# Patient Record
Sex: MALE | Race: WHITE | NOT HISPANIC OR LATINO | Employment: OTHER | URBAN - METROPOLITAN AREA
[De-identification: names, ages, dates, MRNs, and addresses within clinical notes are randomized per-mention and may not be internally consistent; named-entity substitution may affect disease eponyms.]

---

## 2023-03-21 LAB
ALANINE AMINOTRANSFERASE (SGPT) (U/L) IN SER/PLAS: 16 U/L (ref 10–52)
ALBUMIN (G/DL) IN SER/PLAS: 4.2 G/DL (ref 3.4–5)
ALKALINE PHOSPHATASE (U/L) IN SER/PLAS: 61 U/L (ref 33–136)
ANION GAP IN SER/PLAS: 11 MMOL/L (ref 10–20)
ASPARTATE AMINOTRANSFERASE (SGOT) (U/L) IN SER/PLAS: 23 U/L (ref 9–39)
BASOPHILS (10*3/UL) IN BLOOD BY AUTOMATED COUNT: 0.06 X10E9/L (ref 0–0.1)
BASOPHILS/100 LEUKOCYTES IN BLOOD BY AUTOMATED COUNT: 0.8 % (ref 0–2)
BILIRUBIN TOTAL (MG/DL) IN SER/PLAS: 0.3 MG/DL (ref 0–1.2)
CALCIUM (MG/DL) IN SER/PLAS: 9.4 MG/DL (ref 8.6–10.3)
CARBON DIOXIDE, TOTAL (MMOL/L) IN SER/PLAS: 26 MMOL/L (ref 21–32)
CHLORIDE (MMOL/L) IN SER/PLAS: 107 MMOL/L (ref 98–107)
CREATININE (MG/DL) IN SER/PLAS: 1.27 MG/DL (ref 0.5–1.3)
EOSINOPHILS (10*3/UL) IN BLOOD BY AUTOMATED COUNT: 0.35 X10E9/L (ref 0–0.4)
EOSINOPHILS/100 LEUKOCYTES IN BLOOD BY AUTOMATED COUNT: 4.8 % (ref 0–6)
ERYTHROCYTE DISTRIBUTION WIDTH (RATIO) BY AUTOMATED COUNT: 12.5 % (ref 11.5–14.5)
ERYTHROCYTE MEAN CORPUSCULAR HEMOGLOBIN CONCENTRATION (G/DL) BY AUTOMATED: 32.5 G/DL (ref 32–36)
ERYTHROCYTE MEAN CORPUSCULAR VOLUME (FL) BY AUTOMATED COUNT: 91 FL (ref 80–100)
ERYTHROCYTES (10*6/UL) IN BLOOD BY AUTOMATED COUNT: 4.2 X10E12/L (ref 4.5–5.9)
GFR MALE: 57 ML/MIN/1.73M2
GLUCOSE (MG/DL) IN SER/PLAS: 110 MG/DL (ref 74–99)
HEMATOCRIT (%) IN BLOOD BY AUTOMATED COUNT: 38.1 % (ref 41–52)
HEMOGLOBIN (G/DL) IN BLOOD: 12.4 G/DL (ref 13.5–17.5)
IMMATURE GRANULOCYTES/100 LEUKOCYTES IN BLOOD BY AUTOMATED COUNT: 0.5 % (ref 0–0.9)
LEUKOCYTES (10*3/UL) IN BLOOD BY AUTOMATED COUNT: 7.4 X10E9/L (ref 4.4–11.3)
LYMPHOCYTES (10*3/UL) IN BLOOD BY AUTOMATED COUNT: 2.1 X10E9/L (ref 0.8–3)
LYMPHOCYTES/100 LEUKOCYTES IN BLOOD BY AUTOMATED COUNT: 28.5 % (ref 13–44)
MONOCYTES (10*3/UL) IN BLOOD BY AUTOMATED COUNT: 0.71 X10E9/L (ref 0.05–0.8)
MONOCYTES/100 LEUKOCYTES IN BLOOD BY AUTOMATED COUNT: 9.6 % (ref 2–10)
NEUTROPHILS (10*3/UL) IN BLOOD BY AUTOMATED COUNT: 4.1 X10E9/L (ref 1.6–5.5)
NEUTROPHILS/100 LEUKOCYTES IN BLOOD BY AUTOMATED COUNT: 55.8 % (ref 40–80)
PLATELETS (10*3/UL) IN BLOOD AUTOMATED COUNT: 226 X10E9/L (ref 150–450)
POTASSIUM (MMOL/L) IN SER/PLAS: 4.5 MMOL/L (ref 3.5–5.3)
PROTEIN TOTAL: 7.1 G/DL (ref 6.4–8.2)
SODIUM (MMOL/L) IN SER/PLAS: 139 MMOL/L (ref 136–145)
UREA NITROGEN (MG/DL) IN SER/PLAS: 21 MG/DL (ref 6–23)

## 2023-04-10 LAB
C REACTIVE PROTEIN (MG/L) IN SER/PLAS: 0.53 MG/DL
SEDIMENTATION RATE, ERYTHROCYTE: 9 MM/H (ref 0–20)

## 2023-04-13 LAB
ANCA IFA PATTERN: NORMAL
ANCA IFA TITER: NORMAL
MYELOPEROXIDASE (MPO) AB, IGG: 0 AU/ML (ref 0–19)
SERINE PROTEINASE 3 (PR3) AB, IGG: 0 AU/ML (ref 0–19)

## 2023-05-09 ENCOUNTER — APPOINTMENT (OUTPATIENT)
Dept: PRIMARY CARE | Facility: CLINIC | Age: 83
End: 2023-05-09
Payer: MEDICARE

## 2023-05-11 NOTE — PROGRESS NOTES
Subjective   Patient ID: Maurice Tripp is a 82 y.o. male who presents for referral  for cardiology (C/O SOB on exertion).    Is requesting cardiology referral at this time for concerns of shortness of breath.  Patient has been doing yard work recently and reviewed building a pond area which has required him to be driving rebar into the ground.  States what when he is doing this he will have episodic shortness of breath which he states last for 3 to 5 minutes.  He notes improvement after taking several deep breaths and will feel fine and is able to go back to working.  Denies any concurrent anginal symptoms with this, no syncope or presyncope that he has noticed, no back pain that is concurrent either.  Denies any radiation or referred pain.  Patient does have recent pulmonary history as noted below after work-up for concerning nodules initially noticed on chest x-ray.  Patient has established with pulmonology however is currently looking for a new pulmonologist as his has recently relocated.  Does have history of shortness of breath which patient states that he was noticing approximately 7 months ago however now has acutely worsened over the last week since he increased his activity.  Review of patient's recent CTs did show some scattered demonstrations of calcification within the aorta along with the coronary arteries.  Has previously been evaluated by cardiology proximately 10 years ago with exercise stress test and echo which patient states was without any abnormalities.  Denies any previous smoking history.  Cardiac risk factors: FMHx (father  at 52 of MI), male, obesity, HLD/HTN (both well controlled).     Past medical history significant for long nodule which patient follows up with pulm/oncology.  Did have a biopsy performed of the nodule which was negative for malignancy.  Per pulmonology notes suspected inflammatory etiology.  Interval changing following serial CTs of the chest showed decreasing size  "of pulmonary nodule with cavitation suspicious for potential granulomatosis.  Patient does have history of rheumatoid arthritis that is seropositive therefore is at risk for other autoimmune conditions including vasculitis.  Per rheumatology low suspicion for pulmonary rheumatoid nodules and recommended that infection versus malignancy versus vasculitis be worked up.  Most recent ANCA/vasculitis profile negative.         Review of Systems   Constitutional:  Positive for fatigue.   Respiratory:  Positive for cough, shortness of breath and wheezing.    Cardiovascular:  Negative for chest pain.   Gastrointestinal:  Negative for nausea and vomiting.   Neurological:  Negative for syncope.       Objective   /74 (BP Location: Right arm, Patient Position: Sitting)   Pulse 68   Temp 36.7 °C (98 °F)   Resp 18   Ht 1.854 m (6' 1\")   Wt 109 kg (241 lb)   SpO2 94%   BMI 31.80 kg/m²     Physical Exam  Cardiovascular:      Rate and Rhythm: Normal rate and regular rhythm.      Pulses: Normal pulses.      Heart sounds: No murmur heard.     No friction rub. No gallop.   Pulmonary:      Effort: Pulmonary effort is normal. No respiratory distress.      Breath sounds: Normal breath sounds. No wheezing, rhonchi or rales.   Musculoskeletal:      Right lower leg: No edema.      Left lower leg: No edema.         Assessment/Plan   Problem List Items Addressed This Visit          Respiratory    Shortness of breath - Primary    Relevant Orders    ECG 12 lead    Referral to Cardiology    Referral to Pulmonology     Other Visit Diagnoses       Other fatigue        Relevant Orders    ECG 12 lead        Shortness of breath: We will refer patient at this time to cardiology along with pulmonology.  Given patient's recent pulmonary nodule findings and now new onset worsening shortness of breath, concerned that there is a correlation between these 2 things or if patient has any underlying lung disease whether correlated to rheumatological " condition or not.  EKG performed in office today showed sinus bradycardia with normal rhythm, no acute concerning ischemic findings notable.  Discussed ED precautions with patient such as if increase in or worsening of symptoms.  Placed additional referral to pulmonology recommend that patient follow-up with both cardiology and pulmonology within the next 2 to 3 weeks if he is unable to get an appointment at that time recommend that he contact the office.  Patient recently had blood work over the last several weeks therefore no indication to repeat at this time.  Lipid panel from July 2022 showed patient to be at goal, no significant weight gain or change since that time.  Patient will return to clinic for follow-up after completion of referrals.

## 2023-05-12 ENCOUNTER — OFFICE VISIT (OUTPATIENT)
Dept: PRIMARY CARE | Facility: CLINIC | Age: 83
End: 2023-05-12
Payer: MEDICARE

## 2023-05-12 VITALS
RESPIRATION RATE: 18 BRPM | HEIGHT: 73 IN | WEIGHT: 241 LBS | TEMPERATURE: 98 F | SYSTOLIC BLOOD PRESSURE: 120 MMHG | DIASTOLIC BLOOD PRESSURE: 74 MMHG | BODY MASS INDEX: 31.94 KG/M2 | OXYGEN SATURATION: 94 % | HEART RATE: 68 BPM

## 2023-05-12 DIAGNOSIS — R53.83 OTHER FATIGUE: ICD-10-CM

## 2023-05-12 DIAGNOSIS — R06.02 SHORTNESS OF BREATH: Primary | ICD-10-CM

## 2023-05-12 PROBLEM — R97.20 ELEVATED PSA: Status: ACTIVE | Noted: 2023-05-12

## 2023-05-12 PROBLEM — N52.9 MALE ERECTILE DISORDER: Status: ACTIVE | Noted: 2023-05-12

## 2023-05-12 PROBLEM — J01.00 ACUTE NON-RECURRENT MAXILLARY SINUSITIS: Status: RESOLVED | Noted: 2023-05-12 | Resolved: 2023-05-12

## 2023-05-12 PROBLEM — E78.5 HYPERLIPIDEMIA: Status: ACTIVE | Noted: 2023-05-12

## 2023-05-12 PROBLEM — B35.1 FUNGAL NAIL INFECTION: Status: RESOLVED | Noted: 2023-05-12 | Resolved: 2023-05-12

## 2023-05-12 PROBLEM — F10.20 ALCOHOL DEPENDENCE (MULTI): Status: RESOLVED | Noted: 2023-05-12 | Resolved: 2023-05-12

## 2023-05-12 PROBLEM — M06.9 RHEUMATOID ARTHRITIS (MULTI): Status: ACTIVE | Noted: 2023-03-24

## 2023-05-12 PROBLEM — I10 HYPERTENSION: Status: ACTIVE | Noted: 2023-05-12

## 2023-05-12 PROBLEM — N18.31 STAGE 3A CHRONIC KIDNEY DISEASE (MULTI): Status: ACTIVE | Noted: 2023-05-12

## 2023-05-12 PROBLEM — G89.29 CHRONIC PAIN OF TOE OF LEFT FOOT: Status: RESOLVED | Noted: 2023-05-12 | Resolved: 2023-05-12

## 2023-05-12 PROBLEM — E55.9 VITAMIN D DEFICIENCY: Status: ACTIVE | Noted: 2023-05-12

## 2023-05-12 PROBLEM — J20.9 ACUTE BRONCHITIS: Status: RESOLVED | Noted: 2023-05-12 | Resolved: 2023-05-12

## 2023-05-12 PROBLEM — K21.9 GERD (GASTROESOPHAGEAL REFLUX DISEASE): Status: ACTIVE | Noted: 2023-05-12

## 2023-05-12 PROBLEM — N40.0 BPH (BENIGN PROSTATIC HYPERPLASIA): Status: ACTIVE | Noted: 2023-05-12

## 2023-05-12 PROBLEM — F41.9 ANXIETY: Status: ACTIVE | Noted: 2023-05-12

## 2023-05-12 PROBLEM — R05.2 SUBACUTE COUGH: Status: RESOLVED | Noted: 2023-05-12 | Resolved: 2023-05-12

## 2023-05-12 PROBLEM — M79.674 CHRONIC PAIN OF TOE OF RIGHT FOOT: Status: RESOLVED | Noted: 2023-05-12 | Resolved: 2023-05-12

## 2023-05-12 PROBLEM — K64.0 GRADE I HEMORRHOIDS: Status: ACTIVE | Noted: 2023-05-12

## 2023-05-12 PROBLEM — G89.29 CHRONIC PAIN OF TOE OF RIGHT FOOT: Status: RESOLVED | Noted: 2023-05-12 | Resolved: 2023-05-12

## 2023-05-12 PROBLEM — R91.8 MULTIPLE PULMONARY NODULES DETERMINED BY COMPUTED TOMOGRAPHY OF LUNG: Status: ACTIVE | Noted: 2023-05-12

## 2023-05-12 PROBLEM — U07.1 COVID-19: Status: RESOLVED | Noted: 2023-05-12 | Resolved: 2023-05-12

## 2023-05-12 PROBLEM — R05.8 PRODUCTIVE COUGH: Status: RESOLVED | Noted: 2023-05-12 | Resolved: 2023-05-12

## 2023-05-12 PROBLEM — M54.12 RIGHT CERVICAL RADICULOPATHY: Status: RESOLVED | Noted: 2023-05-12 | Resolved: 2023-05-12

## 2023-05-12 PROBLEM — R93.89 ABNORMAL CXR (CHEST X-RAY): Status: ACTIVE | Noted: 2023-05-12

## 2023-05-12 PROBLEM — M79.675 CHRONIC PAIN OF TOE OF LEFT FOOT: Status: RESOLVED | Noted: 2023-05-12 | Resolved: 2023-05-12

## 2023-05-12 PROBLEM — K62.5 BRBPR (BRIGHT RED BLOOD PER RECTUM): Status: RESOLVED | Noted: 2023-05-12 | Resolved: 2023-05-12

## 2023-05-12 PROCEDURE — 1036F TOBACCO NON-USER: CPT

## 2023-05-12 PROCEDURE — 3074F SYST BP LT 130 MM HG: CPT

## 2023-05-12 PROCEDURE — 1159F MED LIST DOCD IN RCRD: CPT

## 2023-05-12 PROCEDURE — 1157F ADVNC CARE PLAN IN RCRD: CPT

## 2023-05-12 PROCEDURE — 93000 ELECTROCARDIOGRAM COMPLETE: CPT | Performed by: FAMILY MEDICINE

## 2023-05-12 PROCEDURE — 3078F DIAST BP <80 MM HG: CPT

## 2023-05-12 PROCEDURE — 99214 OFFICE O/P EST MOD 30 MIN: CPT

## 2023-05-12 RX ORDER — LEFLUNOMIDE 20 MG/1
1 TABLET ORAL DAILY
COMMUNITY
Start: 2022-10-17 | End: 2024-01-03 | Stop reason: SDUPTHER

## 2023-05-12 RX ORDER — LOSARTAN POTASSIUM 100 MG/1
TABLET ORAL
COMMUNITY
Start: 2019-07-11 | End: 2024-02-28

## 2023-05-12 RX ORDER — IRBESARTAN 150 MG/1
150 TABLET ORAL
COMMUNITY
Start: 2020-02-02 | End: 2023-08-14 | Stop reason: ALTCHOICE

## 2023-05-12 RX ORDER — LOVASTATIN 20 MG/1
TABLET ORAL
COMMUNITY
Start: 2022-10-07 | End: 2023-11-30

## 2023-05-12 RX ORDER — ASPIRIN 81 MG/1
1 TABLET ORAL DAILY
COMMUNITY
End: 2024-02-06 | Stop reason: ALTCHOICE

## 2023-05-12 RX ORDER — FOLIC ACID 1 MG/1
1 TABLET ORAL DAILY
COMMUNITY
End: 2024-03-19 | Stop reason: ALTCHOICE

## 2023-05-12 RX ORDER — HYDROXYCHLOROQUINE SULFATE 200 MG/1
TABLET, FILM COATED ORAL
COMMUNITY
Start: 2020-02-02 | End: 2023-08-23

## 2023-05-12 RX ORDER — OMEPRAZOLE 10 MG/1
CAPSULE, DELAYED RELEASE ORAL
COMMUNITY
Start: 2022-04-26 | End: 2023-09-29

## 2023-05-12 RX ORDER — CITALOPRAM 20 MG/1
TABLET, FILM COATED ORAL
COMMUNITY
Start: 2020-02-18 | End: 2023-12-28

## 2023-05-12 RX ORDER — UBIDECARENONE 75 MG
500 CAPSULE ORAL
COMMUNITY
Start: 2020-02-02 | End: 2024-04-23 | Stop reason: WASHOUT

## 2023-05-12 ASSESSMENT — ENCOUNTER SYMPTOMS
VOMITING: 0
COUGH: 1
FATIGUE: 1
WHEEZING: 1
NAUSEA: 0
SHORTNESS OF BREATH: 1

## 2023-05-12 NOTE — PROGRESS NOTES
I reviewed with the resident the medical history and the resident’s findings on physical examination.  I discussed with the resident the patient’s diagnosis and concur with the treatment plan as documented in the resident note.     Kwame Meza, DO

## 2023-06-01 LAB
ALANINE AMINOTRANSFERASE (SGPT) (U/L) IN SER/PLAS: 22 U/L (ref 10–52)
ALBUMIN (G/DL) IN SER/PLAS: 4.1 G/DL (ref 3.4–5)
ALKALINE PHOSPHATASE (U/L) IN SER/PLAS: 52 U/L (ref 33–136)
ANION GAP IN SER/PLAS: 12 MMOL/L (ref 10–20)
ASPARTATE AMINOTRANSFERASE (SGOT) (U/L) IN SER/PLAS: 27 U/L (ref 9–39)
BILIRUBIN TOTAL (MG/DL) IN SER/PLAS: 0.4 MG/DL (ref 0–1.2)
CALCIUM (MG/DL) IN SER/PLAS: 9.5 MG/DL (ref 8.6–10.3)
CARBON DIOXIDE, TOTAL (MMOL/L) IN SER/PLAS: 25 MMOL/L (ref 21–32)
CHLORIDE (MMOL/L) IN SER/PLAS: 106 MMOL/L (ref 98–107)
CREATININE (MG/DL) IN SER/PLAS: 1.3 MG/DL (ref 0.5–1.3)
ERYTHROCYTE DISTRIBUTION WIDTH (RATIO) BY AUTOMATED COUNT: 12.8 % (ref 11.5–14.5)
ERYTHROCYTE MEAN CORPUSCULAR HEMOGLOBIN CONCENTRATION (G/DL) BY AUTOMATED: 32.3 G/DL (ref 32–36)
ERYTHROCYTE MEAN CORPUSCULAR VOLUME (FL) BY AUTOMATED COUNT: 91 FL (ref 80–100)
ERYTHROCYTES (10*6/UL) IN BLOOD BY AUTOMATED COUNT: 3.97 X10E12/L (ref 4.5–5.9)
GFR MALE: 55 ML/MIN/1.73M2
GLUCOSE (MG/DL) IN SER/PLAS: 77 MG/DL (ref 74–99)
HEMATOCRIT (%) IN BLOOD BY AUTOMATED COUNT: 36.2 % (ref 41–52)
HEMOGLOBIN (G/DL) IN BLOOD: 11.7 G/DL (ref 13.5–17.5)
LEUKOCYTES (10*3/UL) IN BLOOD BY AUTOMATED COUNT: 6.5 X10E9/L (ref 4.4–11.3)
PLATELETS (10*3/UL) IN BLOOD AUTOMATED COUNT: 218 X10E9/L (ref 150–450)
POTASSIUM (MMOL/L) IN SER/PLAS: 4.7 MMOL/L (ref 3.5–5.3)
PROTEIN TOTAL: 6.9 G/DL (ref 6.4–8.2)
SODIUM (MMOL/L) IN SER/PLAS: 138 MMOL/L (ref 136–145)
UREA NITROGEN (MG/DL) IN SER/PLAS: 19 MG/DL (ref 6–23)

## 2023-06-06 LAB — NATRIURETIC PEPTIDE B (PG/ML) IN SER/PLAS: 38 PG/ML (ref 0–99)

## 2023-07-17 LAB
ALANINE AMINOTRANSFERASE (SGPT) (U/L) IN SER/PLAS: 16 U/L (ref 10–52)
ALBUMIN (G/DL) IN SER/PLAS: 4.2 G/DL (ref 3.4–5)
ALKALINE PHOSPHATASE (U/L) IN SER/PLAS: 53 U/L (ref 33–136)
ANION GAP IN SER/PLAS: 13 MMOL/L (ref 10–20)
ASPARTATE AMINOTRANSFERASE (SGOT) (U/L) IN SER/PLAS: 20 U/L (ref 9–39)
BASOPHILS (10*3/UL) IN BLOOD BY AUTOMATED COUNT: 0.08 X10E9/L (ref 0–0.1)
BASOPHILS/100 LEUKOCYTES IN BLOOD BY AUTOMATED COUNT: 1.1 % (ref 0–2)
BILIRUBIN TOTAL (MG/DL) IN SER/PLAS: 0.5 MG/DL (ref 0–1.2)
CALCIUM (MG/DL) IN SER/PLAS: 9.5 MG/DL (ref 8.6–10.3)
CARBON DIOXIDE, TOTAL (MMOL/L) IN SER/PLAS: 27 MMOL/L (ref 21–32)
CHLORIDE (MMOL/L) IN SER/PLAS: 104 MMOL/L (ref 98–107)
CREATININE (MG/DL) IN SER/PLAS: 1.45 MG/DL (ref 0.5–1.3)
EOSINOPHILS (10*3/UL) IN BLOOD BY AUTOMATED COUNT: 0.31 X10E9/L (ref 0–0.4)
EOSINOPHILS/100 LEUKOCYTES IN BLOOD BY AUTOMATED COUNT: 4.4 % (ref 0–6)
ERYTHROCYTE DISTRIBUTION WIDTH (RATIO) BY AUTOMATED COUNT: 12.3 % (ref 11.5–14.5)
ERYTHROCYTE MEAN CORPUSCULAR HEMOGLOBIN CONCENTRATION (G/DL) BY AUTOMATED: 32.2 G/DL (ref 32–36)
ERYTHROCYTE MEAN CORPUSCULAR VOLUME (FL) BY AUTOMATED COUNT: 91 FL (ref 80–100)
ERYTHROCYTES (10*6/UL) IN BLOOD BY AUTOMATED COUNT: 4.24 X10E12/L (ref 4.5–5.9)
GFR MALE: 48 ML/MIN/1.73M2
GLUCOSE (MG/DL) IN SER/PLAS: 99 MG/DL (ref 74–99)
HEMATOCRIT (%) IN BLOOD BY AUTOMATED COUNT: 38.5 % (ref 41–52)
HEMOGLOBIN (G/DL) IN BLOOD: 12.4 G/DL (ref 13.5–17.5)
IMMATURE GRANULOCYTES/100 LEUKOCYTES IN BLOOD BY AUTOMATED COUNT: 0.6 % (ref 0–0.9)
LEUKOCYTES (10*3/UL) IN BLOOD BY AUTOMATED COUNT: 7 X10E9/L (ref 4.4–11.3)
LYMPHOCYTES (10*3/UL) IN BLOOD BY AUTOMATED COUNT: 2.08 X10E9/L (ref 0.8–3)
LYMPHOCYTES/100 LEUKOCYTES IN BLOOD BY AUTOMATED COUNT: 29.7 % (ref 13–44)
MONOCYTES (10*3/UL) IN BLOOD BY AUTOMATED COUNT: 0.83 X10E9/L (ref 0.05–0.8)
MONOCYTES/100 LEUKOCYTES IN BLOOD BY AUTOMATED COUNT: 11.8 % (ref 2–10)
NEUTROPHILS (10*3/UL) IN BLOOD BY AUTOMATED COUNT: 3.67 X10E9/L (ref 1.6–5.5)
NEUTROPHILS/100 LEUKOCYTES IN BLOOD BY AUTOMATED COUNT: 52.4 % (ref 40–80)
PLATELETS (10*3/UL) IN BLOOD AUTOMATED COUNT: 213 X10E9/L (ref 150–450)
POTASSIUM (MMOL/L) IN SER/PLAS: 4.9 MMOL/L (ref 3.5–5.3)
PROTEIN TOTAL: 7.1 G/DL (ref 6.4–8.2)
SODIUM (MMOL/L) IN SER/PLAS: 139 MMOL/L (ref 136–145)
UREA NITROGEN (MG/DL) IN SER/PLAS: 19 MG/DL (ref 6–23)

## 2023-08-01 LAB
CREATININE (MG/DL) IN SER/PLAS: 1.36 MG/DL (ref 0.5–1.3)
GFR MALE: 52 ML/MIN/1.73M2
UREA NITROGEN (MG/DL) IN SER/PLAS: 24 MG/DL (ref 6–23)

## 2023-08-10 ENCOUNTER — APPOINTMENT (OUTPATIENT)
Dept: PRIMARY CARE | Facility: CLINIC | Age: 83
End: 2023-08-10
Payer: MEDICARE

## 2023-08-14 ENCOUNTER — OFFICE VISIT (OUTPATIENT)
Dept: PRIMARY CARE | Facility: CLINIC | Age: 83
End: 2023-08-14
Payer: MEDICARE

## 2023-08-14 ENCOUNTER — LAB (OUTPATIENT)
Dept: LAB | Facility: LAB | Age: 83
End: 2023-08-14
Payer: MEDICARE

## 2023-08-14 VITALS
HEIGHT: 73 IN | RESPIRATION RATE: 16 BRPM | BODY MASS INDEX: 31.96 KG/M2 | OXYGEN SATURATION: 95 % | TEMPERATURE: 97.1 F | WEIGHT: 241.13 LBS | HEART RATE: 80 BPM | SYSTOLIC BLOOD PRESSURE: 103 MMHG | DIASTOLIC BLOOD PRESSURE: 69 MMHG

## 2023-08-14 DIAGNOSIS — E78.5 HYPERLIPIDEMIA, UNSPECIFIED HYPERLIPIDEMIA TYPE: ICD-10-CM

## 2023-08-14 DIAGNOSIS — Z12.5 PROSTATE CANCER SCREENING: ICD-10-CM

## 2023-08-14 DIAGNOSIS — D69.2 OTHER NONTHROMBOCYTOPENIC PURPURA (CMS-HCC): ICD-10-CM

## 2023-08-14 DIAGNOSIS — N18.31 STAGE 3A CHRONIC KIDNEY DISEASE (MULTI): ICD-10-CM

## 2023-08-14 DIAGNOSIS — M06.9 RHEUMATOID ARTHRITIS, INVOLVING UNSPECIFIED SITE, UNSPECIFIED WHETHER RHEUMATOID FACTOR PRESENT (MULTI): ICD-10-CM

## 2023-08-14 DIAGNOSIS — Z00.00 ROUTINE GENERAL MEDICAL EXAMINATION AT HEALTH CARE FACILITY: Primary | ICD-10-CM

## 2023-08-14 LAB
CHOLESTEROL (MG/DL) IN SER/PLAS: 138 MG/DL (ref 0–199)
CHOLESTEROL IN HDL (MG/DL) IN SER/PLAS: 36.3 MG/DL
CHOLESTEROL/HDL RATIO: 3.8
LDL: 55 MG/DL (ref 0–99)
NON HDL CHOLESTEROL: 102 MG/DL
PROSTATE SPECIFIC AG (NG/ML) IN SER/PLAS: 1.89 NG/ML (ref 0–4)
TRIGLYCERIDE (MG/DL) IN SER/PLAS: 236 MG/DL (ref 0–149)
VLDL: 47 MG/DL (ref 0–40)

## 2023-08-14 PROCEDURE — 36415 COLL VENOUS BLD VENIPUNCTURE: CPT

## 2023-08-14 PROCEDURE — G0103 PSA SCREENING: HCPCS

## 2023-08-14 PROCEDURE — 99214 OFFICE O/P EST MOD 30 MIN: CPT | Performed by: STUDENT IN AN ORGANIZED HEALTH CARE EDUCATION/TRAINING PROGRAM

## 2023-08-14 PROCEDURE — 1157F ADVNC CARE PLAN IN RCRD: CPT | Performed by: STUDENT IN AN ORGANIZED HEALTH CARE EDUCATION/TRAINING PROGRAM

## 2023-08-14 PROCEDURE — G0439 PPPS, SUBSEQ VISIT: HCPCS | Performed by: STUDENT IN AN ORGANIZED HEALTH CARE EDUCATION/TRAINING PROGRAM

## 2023-08-14 PROCEDURE — 3078F DIAST BP <80 MM HG: CPT | Performed by: STUDENT IN AN ORGANIZED HEALTH CARE EDUCATION/TRAINING PROGRAM

## 2023-08-14 PROCEDURE — 1160F RVW MEDS BY RX/DR IN RCRD: CPT | Performed by: STUDENT IN AN ORGANIZED HEALTH CARE EDUCATION/TRAINING PROGRAM

## 2023-08-14 PROCEDURE — 80061 LIPID PANEL: CPT

## 2023-08-14 PROCEDURE — 1036F TOBACCO NON-USER: CPT | Performed by: STUDENT IN AN ORGANIZED HEALTH CARE EDUCATION/TRAINING PROGRAM

## 2023-08-14 PROCEDURE — 1159F MED LIST DOCD IN RCRD: CPT | Performed by: STUDENT IN AN ORGANIZED HEALTH CARE EDUCATION/TRAINING PROGRAM

## 2023-08-14 PROCEDURE — 3074F SYST BP LT 130 MM HG: CPT | Performed by: STUDENT IN AN ORGANIZED HEALTH CARE EDUCATION/TRAINING PROGRAM

## 2023-08-14 SDOH — ECONOMIC STABILITY: HOUSING INSECURITY
IN THE LAST 12 MONTHS, WAS THERE A TIME WHEN YOU DID NOT HAVE A STEADY PLACE TO SLEEP OR SLEPT IN A SHELTER (INCLUDING NOW)?: NO

## 2023-08-14 SDOH — ECONOMIC STABILITY: FOOD INSECURITY: WITHIN THE PAST 12 MONTHS, THE FOOD YOU BOUGHT JUST DIDN'T LAST AND YOU DIDN'T HAVE MONEY TO GET MORE.: NEVER TRUE

## 2023-08-14 SDOH — ECONOMIC STABILITY: FOOD INSECURITY: WITHIN THE PAST 12 MONTHS, YOU WORRIED THAT YOUR FOOD WOULD RUN OUT BEFORE YOU GOT MONEY TO BUY MORE.: NEVER TRUE

## 2023-08-14 SDOH — HEALTH STABILITY: PHYSICAL HEALTH: ON AVERAGE, HOW MANY MINUTES DO YOU ENGAGE IN EXERCISE AT THIS LEVEL?: 0 MIN

## 2023-08-14 SDOH — ECONOMIC STABILITY: INCOME INSECURITY: IN THE LAST 12 MONTHS, WAS THERE A TIME WHEN YOU WERE NOT ABLE TO PAY THE MORTGAGE OR RENT ON TIME?: NO

## 2023-08-14 SDOH — ECONOMIC STABILITY: TRANSPORTATION INSECURITY
IN THE PAST 12 MONTHS, HAS LACK OF TRANSPORTATION KEPT YOU FROM MEETINGS, WORK, OR FROM GETTING THINGS NEEDED FOR DAILY LIVING?: NO

## 2023-08-14 SDOH — ECONOMIC STABILITY: TRANSPORTATION INSECURITY
IN THE PAST 12 MONTHS, HAS THE LACK OF TRANSPORTATION KEPT YOU FROM MEDICAL APPOINTMENTS OR FROM GETTING MEDICATIONS?: NO

## 2023-08-14 SDOH — HEALTH STABILITY: PHYSICAL HEALTH: ON AVERAGE, HOW MANY DAYS PER WEEK DO YOU ENGAGE IN MODERATE TO STRENUOUS EXERCISE (LIKE A BRISK WALK)?: 0 DAYS

## 2023-08-14 ASSESSMENT — ENCOUNTER SYMPTOMS
DEPRESSION: 0
CONSTIPATION: 0
RHINORRHEA: 0
ARTHRALGIAS: 0
LOSS OF SENSATION IN FEET: 0
VOMITING: 0
MYALGIAS: 0
DIZZINESS: 0
LIGHT-HEADEDNESS: 0
FATIGUE: 0
NAUSEA: 0
SHORTNESS OF BREATH: 0
APPETITE CHANGE: 0
ABDOMINAL PAIN: 0
BLOOD IN STOOL: 0
HEMATURIA: 0
FEVER: 0
DIARRHEA: 0
PALPITATIONS: 0
OCCASIONAL FEELINGS OF UNSTEADINESS: 0
SINUS PAIN: 0
FLANK PAIN: 0

## 2023-08-14 ASSESSMENT — SOCIAL DETERMINANTS OF HEALTH (SDOH)
HOW HARD IS IT FOR YOU TO PAY FOR THE VERY BASICS LIKE FOOD, HOUSING, MEDICAL CARE, AND HEATING?: NOT HARD AT ALL
HOW OFTEN DO YOU GET TOGETHER WITH FRIENDS OR RELATIVES?: ONCE A WEEK
WITHIN THE LAST YEAR, HAVE YOU BEEN HUMILIATED OR EMOTIONALLY ABUSED IN OTHER WAYS BY YOUR PARTNER OR EX-PARTNER?: NO
WITHIN THE LAST YEAR, HAVE TO BEEN RAPED OR FORCED TO HAVE ANY KIND OF SEXUAL ACTIVITY BY YOUR PARTNER OR EX-PARTNER?: NO
HOW OFTEN DO YOU ATTEND CHURCH OR RELIGIOUS SERVICES?: NEVER
DO YOU BELONG TO ANY CLUBS OR ORGANIZATIONS SUCH AS CHURCH GROUPS UNIONS, FRATERNAL OR ATHLETIC GROUPS, OR SCHOOL GROUPS?: YES
IN THE PAST 12 MONTHS, HAS THE ELECTRIC, GAS, OIL, OR WATER COMPANY THREATENED TO SHUT OFF SERVICE IN YOUR HOME?: NO
WITHIN THE LAST YEAR, HAVE YOU BEEN KICKED, HIT, SLAPPED, OR OTHERWISE PHYSICALLY HURT BY YOUR PARTNER OR EX-PARTNER?: NO
HOW OFTEN DO YOU ATTENT MEETINGS OF THE CLUB OR ORGANIZATION YOU BELONG TO?: MORE THAN 4 TIMES PER YEAR
WITHIN THE LAST YEAR, HAVE YOU BEEN AFRAID OF YOUR PARTNER OR EX-PARTNER?: NO
IN A TYPICAL WEEK, HOW MANY TIMES DO YOU TALK ON THE PHONE WITH FAMILY, FRIENDS, OR NEIGHBORS?: THREE TIMES A WEEK

## 2023-08-14 ASSESSMENT — LIFESTYLE VARIABLES
HOW OFTEN DO YOU HAVE A DRINK CONTAINING ALCOHOL: NEVER
HOW MANY STANDARD DRINKS CONTAINING ALCOHOL DO YOU HAVE ON A TYPICAL DAY: PATIENT DOES NOT DRINK

## 2023-08-14 ASSESSMENT — ACTIVITIES OF DAILY LIVING (ADL)
DOING_HOUSEWORK: INDEPENDENT
GROCERY_SHOPPING: INDEPENDENT
MANAGING_FINANCES: INDEPENDENT
TAKING_MEDICATION: INDEPENDENT

## 2023-08-14 ASSESSMENT — PATIENT HEALTH QUESTIONNAIRE - PHQ9
2. FEELING DOWN, DEPRESSED OR HOPELESS: NOT AT ALL
1. LITTLE INTEREST OR PLEASURE IN DOING THINGS: NOT AT ALL
SUM OF ALL RESPONSES TO PHQ9 QUESTIONS 1 AND 2: 0

## 2023-08-14 NOTE — PROGRESS NOTES
Subjective   Maurice Tripp is a 82 y.o. male who is here for a routine exam.  Active Problem List      Comprehensive Medical/Surgical/Social/Family History  Past Medical History:   Diagnosis Date    Acute bronchitis 05/12/2023    Acute non-recurrent maxillary sinusitis 05/12/2023    Alcohol dependence (CMS/HCC) 05/12/2023    BRBPR (bright red blood per rectum) 05/12/2023    Fungal nail infection 05/12/2023    Right cervical radiculopathy 05/12/2023    Subacute cough 05/12/2023     Past Surgical History:   Procedure Laterality Date    CT GUIDED PERCUTANEOUS BIOPSY LUNG  2/10/2023    CT GUIDED PERCUTANEOUS BIOPSY LUNG 2/10/2023 [facility]     Social History     Social History Narrative    Not on file         Allergies and Medications  Patient has no known allergies.  Current Outpatient Medications on File Prior to Visit   Medication Sig Dispense Refill    aspirin 81 mg EC tablet Take 1 tablet (81 mg) by mouth once daily.      citalopram (CeleXA) 20 mg tablet citalopram 20 mg tablet   TAKE 1 TABLET BY MOUTH EVERY DAY      cyanocobalamin (Vitamin B-12) 500 mcg tablet 1 tablet (500 mcg).      folic acid (Folvite) 1 mg tablet Take 1 tablet (1 mg) by mouth once daily.      hydroxychloroquine (Plaquenil) 200 mg tablet hydroxychloroquine 200 mg tablet   TAKE 2 TABLETS BY MOUTH EVERY DAY      leflunomide (Arava) 20 mg tablet Take 1 tablet (20 mg) by mouth once daily.      losartan (Cozaar) 100 mg tablet losartan 100 mg tablet   TAKE 1/2 TABLET BY MOUTH DAILY      lovastatin (Mevacor) 20 mg tablet lovastatin 20 mg tablet   TAKE 1 TABLET BY MOUTH EVERY DAY AS DIRECTED      omeprazole (PriLOSEC) 10 mg DR capsule omeprazole 10 mg capsule,delayed release   TAKE 1 CAPSULE BY MOUTH EVERY DAY      [DISCONTINUED] irbesartan (Avapro) 150 mg tablet 1 tablet (150 mg).       No current facility-administered medications on file prior to visit.     Follow with cardio - [facility]  Pulm - Pulm nodules/heme/onc - [facility]  Rheum -  "[facility]    Tired/Fatigue -states progressively more fatigued.  No changes in sleeping habits.  He has decreased physical activity, does not the urge to do these physical activities.  When he does he has difficulty.  Had significant shortness of breath with physical activity when he was working on his backyard, building a fire pit area.  He did see his cardiologist after this and was largely unremarkable.    Does have follow-up with pulmonology, rheumatology.  Does have chronic muscle aches pains and general tightness associated.  He has not taken a steroid in a significant amount of time, he is on hydroxychloroquine through rheumatology.        Lifestyle  Diet: Well-balanced, appropriate fruits vegetables meats  Exercise: Limited as above  Tobacco: None  Alcohol: None  Stress/Work: Retired, no significant stressors      Colorectal Screening:   colonoscopy  Date: 2022  Nocturia: None  Erectile dysfunction: Did not discuss    Review of Systems   Constitutional:  Negative for appetite change, fatigue and fever.   HENT:  Negative for ear discharge, ear pain, hearing loss, postnasal drip, rhinorrhea and sinus pain.    Eyes:  Negative for visual disturbance.   Respiratory:  Negative for shortness of breath.    Cardiovascular:  Negative for chest pain, palpitations and leg swelling.   Gastrointestinal:  Negative for abdominal pain, blood in stool, constipation, diarrhea, nausea and vomiting.   Genitourinary:  Negative for flank pain and hematuria.   Musculoskeletal:  Negative for arthralgias and myalgias.   Skin:  Negative for rash.   Neurological:  Negative for dizziness and light-headedness.   All other systems reviewed and are negative.      Objective   /69 (BP Location: Right arm, Patient Position: Sitting)   Pulse 80   Temp 36.2 °C (97.1 °F) (Temporal)   Resp 16   Ht 1.854 m (6' 1\")   Wt 109 kg (241 lb 2 oz)   SpO2 95%   BMI 31.81 kg/m²     Physical Exam  Vitals reviewed.   Constitutional:       General: " He is not in acute distress.     Appearance: Normal appearance. He is normal weight. He is not toxic-appearing.   HENT:      Head: Normocephalic and atraumatic.      Right Ear: Tympanic membrane and ear canal normal.      Left Ear: Tympanic membrane and ear canal normal.      Nose: Nose normal. No congestion or rhinorrhea.      Mouth/Throat:      Mouth: Mucous membranes are dry.   Eyes:      General: No scleral icterus.     Extraocular Movements: Extraocular movements intact.      Conjunctiva/sclera: Conjunctivae normal.      Pupils: Pupils are equal, round, and reactive to light.   Cardiovascular:      Rate and Rhythm: Normal rate and regular rhythm.      Heart sounds: No murmur heard.     No friction rub. No gallop.   Pulmonary:      Effort: Pulmonary effort is normal. No respiratory distress.      Breath sounds: Normal breath sounds. No wheezing, rhonchi or rales.   Abdominal:      General: Abdomen is flat. There is no distension.      Palpations: Abdomen is soft.      Tenderness: There is no abdominal tenderness. There is no guarding.   Musculoskeletal:         General: Normal range of motion.      Cervical back: Normal range of motion and neck supple.      Right lower leg: No edema.      Left lower leg: No edema.   Lymphadenopathy:      Cervical: No cervical adenopathy.   Skin:     General: Skin is warm and dry.   Neurological:      General: No focal deficit present.      Mental Status: He is alert and oriented to person, place, and time.   Psychiatric:         Mood and Affect: Mood normal.         Behavior: Behavior normal.         Assessment/Plan   Problem List Items Addressed This Visit       Hyperlipidemia    Relevant Orders    Lipid Panel    Rheumatoid arthritis (CMS/HCC)    Stage 3a chronic kidney disease (CMS/HCC)    Other nonthrombocytopenic purpura (CMS/HCC)     Other Visit Diagnoses       Routine general medical examination at health care facility    -  Primary    Prostate cancer screening         Relevant Orders    Prostate Specific Antigen          Reviewed Social Determinants of health with patient, discussed healthy lifestyle including 150 minutes of physical activity per week  Ordered/Reviewed baseline labwork -CBC, CMP, Lipid Panel  Immunizations Up-to-Date  Colonoscopy 2022 follow-up per patient preference.    For patient's fatigue, lungs were at baseline no wheezing.  Patient has had evaluation with cardiology.  Continue with evaluation with pulmonology in 2 days, if acceptable we will send in a prednisone taper which help with rheumatoid pains as well as shortness of breath likely related to chronic lung concerns.    Up-to-date on all other screenings as above.  We will get a lipid panel and PSA today.    Follow-up as new concerns arise.

## 2023-08-23 ENCOUNTER — TELEPHONE (OUTPATIENT)
Dept: PRIMARY CARE | Facility: CLINIC | Age: 83
End: 2023-08-23
Payer: MEDICARE

## 2023-08-23 DIAGNOSIS — M06.9 RHEUMATOID ARTHRITIS, INVOLVING UNSPECIFIED SITE, UNSPECIFIED WHETHER RHEUMATOID FACTOR PRESENT (MULTI): Primary | ICD-10-CM

## 2023-08-23 RX ORDER — HYDROXYCHLOROQUINE SULFATE 200 MG/1
400 TABLET, FILM COATED ORAL DAILY
Qty: 180 TABLET | Refills: 3 | Status: SHIPPED | OUTPATIENT
Start: 2023-08-23

## 2023-08-28 ENCOUNTER — APPOINTMENT (OUTPATIENT)
Dept: PRIMARY CARE | Facility: CLINIC | Age: 83
End: 2023-08-28
Payer: MEDICARE

## 2023-09-29 DIAGNOSIS — K21.9 GASTROESOPHAGEAL REFLUX DISEASE WITHOUT ESOPHAGITIS: Primary | ICD-10-CM

## 2023-09-29 RX ORDER — OMEPRAZOLE 10 MG/1
10 CAPSULE, DELAYED RELEASE ORAL DAILY
Qty: 90 CAPSULE | Refills: 3 | Status: SHIPPED | OUTPATIENT
Start: 2023-09-29

## 2023-10-10 DIAGNOSIS — G47.33 OBSTRUCTIVE SLEEP APNEA (ADULT) (PEDIATRIC): ICD-10-CM

## 2023-10-10 NOTE — TELEPHONE ENCOUNTER
Patient called RE the availability of sleep study results.     Patient agreeable to CPAP therapy - PAP therapy ordered through [facility].

## 2023-11-17 NOTE — PROGRESS NOTES
Subjective   Patient ID: [id number]   Maurice Tripp is a 83 y.o. male with seropositive RA, obesity with a BMI of 32, DL, HTN, and CKD stage III, presents for follow up for RA.     Current IS:  - HCQ 200mg BID (eye exam UTD 10/2023- [facility], no toxicity from plaquanil)   - Leflunomide 20mg QD ( for at least 2-3 years)     Prior IS:  - MTX was on it for a long time, switched over to Leflunomide as he was on MTX for too long.      HPI  He was an , retired last year  Recently diagnosed with SHA,  going in 2 days for fitting CPAP  Saw dermatology 2 weeks ago, no significant rashes  Saw ophtho las month and got checked, he has some problem with night vision in his left eye, he will get a procedure for it in 1 week  Saw pulm Dr. Venegas in 8/23, his assessment was:  Stable nodules on most recent CT scan over the past 6 months with minimal uptake on PET/CT indicating likely a benign etiology. Not sure if these nodules are related to his known history of RA. I would recommend to repeat CT scan of chest in another 6 months to continue observation. No acute respiratory sx. Most recent PFTs revealed normal spirometry, lung volumes and diffusion capacity.     The patient is doing well  Just a few minutes of  morning stiffness, no painful joints or swelling   Compliant with meds  No side effects reported  No episodes of eye inflammation  No sicca sx  No chest pain, cough or dyspnea  No rashes  No infections    ROS:  As per HPI     Rheum hx (Recall from Dr. Craven's notes):  Saw Dr. Roldan 2014 (previously Dr. Servando Barrientos- RA diagnosis, + RF tried gold shots ineffective, MTX + HCQ since ~2004), then saw Dr. Barry.      In the morning, on waking up, gets pain in feet and stiffness in feet/ankles. AM stiffness in feet can last few hours. Denies significant pain/swelling/stiffness in hand, wrists, elbow or shoulder currently. Sees Podiatry (hx of zaina involving all digits). No skin rashes. + dry mouth at night,  no dry eyes, no hx of ocular inflammation. No hx of rheumatoid nodules. Has had paresthesia in feet, EMG/NCS done twice and states the tests were ok. No night sweats, unintentional weight loss, or lymph node swelling . No bowel issues, had a colonoscopy 2 months ago, no concerns     No significant fmhx   Wife recently had a hip surgery. 2 sons. 3 grandkids    Patient Active Problem List   Diagnosis    Abnormal CXR (chest x-ray)    Anxiety    BPH (benign prostatic hyperplasia)    Elevated PSA    GERD (gastroesophageal reflux disease)    Grade I hemorrhoids    Hyperlipidemia    Hypertension    Male erectile disorder    Multiple pulmonary nodules determined by computed tomography of lung    Rheumatoid arthritis (CMS/[facility])    Stage 3a chronic kidney disease (CMS/[facility])    Vitamin D deficiency    Shortness of breath    Other nonthrombocytopenic purpura (CMS/[facility])      Past Medical History:   Diagnosis Date    Acute bronchitis 05/12/2023    Acute non-recurrent maxillary sinusitis 05/12/2023    Alcohol dependence (CMS/[facility]) 05/12/2023    BRBPR (bright red blood per rectum) 05/12/2023    Fungal nail infection 05/12/2023    Right cervical radiculopathy 05/12/2023    Subacute cough 05/12/2023      Past Surgical History:   Procedure Laterality Date    CT GUIDED PERCUTANEOUS BIOPSY LUNG  2/10/2023    CT GUIDED PERCUTANEOUS BIOPSY LUNG 2/10/2023 [facility]      Social History     Socioeconomic History    Marital status:      Spouse name: Not on file    Number of children: Not on file    Years of education: Not on file    Highest education level: Not on file   Occupational History    Not on file   Tobacco Use    Smoking status: Never    Smokeless tobacco: Never   Substance and Sexual Activity    Alcohol use: Not on file    Drug use: Not on file    Sexual activity: Not on file   Other Topics Concern    Not on file   Social History Narrative    Not on file     Social Determinants of Health     Financial Resource Strain:  Low Risk  (8/14/2023)    Overall Financial Resource Strain (CARDIA)     Difficulty of Paying Living Expenses: Not hard at all   Food Insecurity: No Food Insecurity (8/14/2023)    Hunger Vital Sign     Worried About Running Out of Food in the Last Year: Never true     Ran Out of Food in the Last Year: Never true   Transportation Needs: No Transportation Needs (8/14/2023)    PRAPARE - Transportation     Lack of Transportation (Medical): No     Lack of Transportation (Non-Medical): No   Physical Activity: Inactive (8/14/2023)    Exercise Vital Sign     Days of Exercise per Week: 0 days     Minutes of Exercise per Session: 0 min   Stress: No Stress Concern Present (8/14/2023)    [place] [employer] [place] [employer] - Occupational Stress Questionnaire     Feeling of Stress : Not at all   Social Connections: Moderately Integrated (8/14/2023)    Social Connection and Isolation Panel [NHANES]     Frequency of Communication with Friends and Family: Three times a week     Frequency of Social Gatherings with Friends and Family: Once a week     Attends Roman [employer] Services: Never     Active Member of Clubs or Organizations: Yes     Attends Club or Organization Meetings: More than 4 times per year     Marital Status:    Intimate Partner Violence: Not At Risk (8/14/2023)    Humiliation, Afraid, Rape, and Kick questionnaire     Fear of Current or Ex-Partner: No     Emotionally Abused: No     Physically Abused: No     Sexually Abused: No   Housing Stability: Unknown (8/14/2023)    Housing Stability Vital Sign     Unable to Pay for Housing in the Last Year: No     Number of Places Lived in the Last Year: Not on file     Unstable Housing in the Last Year: No      No Known Allergies     Current Outpatient Medications:     aspirin 81 mg EC tablet, Take 1 tablet (81 mg) by mouth once daily., Disp: , Rfl:     citalopram (CeleXA) 20 mg tablet, citalopram 20 mg tablet  TAKE 1 TABLET BY MOUTH EVERY DAY, Disp: , Rfl:      cyanocobalamin (Vitamin B-12) 500 mcg tablet, 1 tablet (500 mcg)., Disp: , Rfl:     folic acid (Folvite) 1 mg tablet, Take 1 tablet (1 mg) by mouth once daily., Disp: , Rfl:     hydroxychloroquine (Plaquenil) 200 mg tablet, Take 2 tablets (400 mg) by mouth once daily., Disp: 180 tablet, Rfl: 3    leflunomide (Arava) 20 mg tablet, Take 1 tablet (20 mg) by mouth once daily., Disp: , Rfl:     losartan (Cozaar) 100 mg tablet, losartan 100 mg tablet  TAKE 1/2 TABLET BY MOUTH DAILY, Disp: , Rfl:     lovastatin (Mevacor) 20 mg tablet, lovastatin 20 mg tablet  TAKE 1 TABLET BY MOUTH EVERY DAY AS DIRECTED, Disp: , Rfl:     omeprazole (PriLOSEC) 10 mg DR capsule, TAKE 1 CAPSULE DAILY, Disp: 90 capsule, Rfl: 3       Objective   Visit Vitals  /68 (BP Location: Left arm, Patient Position: Sitting)   Pulse 72      Physical Exam:  General: AAOx3, Cooperative  Eyes: EOMI, conjunctiva clear, sclera white, anicteric  Throat/Mouth: No oral deformities, no cheek swelling, mucosa appear moist, no oral ulcers noted or loss of dentition   Skin: No rashes, ulcers or photosensitive areas  MSK: Upper Extremities:  Hand/Fingers: No erythema, edema, tenderness or warmth at DIP, PIP, or MCP joints, FROM grossly. Good hand . No nodules. No deformities   Wrists: No erythema, edema, warmth or tenderness at wrist, FROM grossly  Elbows: No tenderness, edema, erythema or warmth at elbows, FROM grossly. No nodules   Shoulders: No edema, erythema, tenderness or warmth at shoulders. FROM  Lower Extremities:   Hips: No obvious deformities. No joint tenderness, normal ROM grossly. No trochanteric bursae TTP  Knees: No tenderness, deformities, edema, rashes, or warmth, normal ROM grossly. No crepitus, no pes anserine bursa TTP   Ankles, feet: No deformities, tenderness, edema, erythema, ulceration, or warmth at the ankle or MTP/IP joints, normal ROM grossly  Spine: No spinal tenderness to palpation. No SI joint tenderness    Lab Results    Component Value Date    WBC 7.0 07/17/2023    HGB 12.4 (L) 07/17/2023    HCT 38.5 (L) 07/17/2023    MCV 91 07/17/2023     07/17/2023        Chemistry    Lab Results   Component Value Date/Time     07/17/2023 0958    K 4.9 07/17/2023 0958     07/17/2023 0958    CO2 27 07/17/2023 0958    BUN 24 (H) 08/01/2023 1135    CREATININE 1.36 (H) 08/01/2023 1135    Lab Results   Component Value Date/Time    CALCIUM 9.5 07/17/2023 0958    ALKPHOS 53 07/17/2023 0958    AST 20 07/17/2023 0958    ALT 16 07/17/2023 0958    BILITOT 0.5 07/17/2023 0958         Lab Results   Component Value Date    CRP 0.53 04/10/2023      Lab Results   Component Value Date    SEDRATE 9 04/10/2023      Lab Results   Component Value Date    ALT 16 07/17/2023    AST 20 07/17/2023    ALKPHOS 53 07/17/2023    BILITOT 0.5 07/17/2023      === 02/10/23 ===  XR CHEST 1 VIEW  No sizable pneumothorax status post recent biopsy.    CT chest 7/23:  Stable scattered pulmonary nodules measuring 1.7 cm or less in size.  No new suspicious pulmonary nodule or mass. Continued surveillance recommended as per Fleischner society guidelines.      Assessment/Plan:  This is an 82 Y M with seropositive RA (, CCP 95), presenting for follow up.    Labs:  7/23: Hg improved, Cr 1.36  4/2023 ESR/CRP nml. ANCA with MPO/PR3 negative   3/21/23 Hg12. 4, GFR 57     CT chest 7/2023:   Stable scattered pulmonary nodules measuring 1.7 cm or less in size. No new suspicious pulmonary nodule or mass    CT chest 3/22/23:   1. Re-demonstration of the patient's known multiple bilateral pulmonary nodules, which have been stable or slightly decreased in size compared to the prior study  2. Interval cavitation of the dominant nodule in the left upper lobe anterior apical segment    PFTs 6/2023, no obstruction. DLCO nml     # Seropositive RA, CDAI: well controlled, no inflammatory arthritis or synovitis on exam. CDAI : 0 -> Remission  # Lung nodule- sees Pulm/Onc. Possible  etiologies including infectious vs inflammatory (RA vs ANCA vasculitis given cavitation) vs malignancy. Given inflammatory arthritis is overall well controlled, less likely RA related and notable there has been some decrease in size without change in RA medications  # Blurry vision with bright lights at night in the left eye    - Labs today and before next radha (CBC, CMP, ESR, CRP)  - Continue Leflunomide 20mg daily  - Continue HCQ 400mg daily (eye exam UTD)  - Follow up with Pulm for cavitation/nodules  - Follow up with ophtho  - Vitamin D/ca . Denies hx of fragility fx  - Vaccine UTD including COVID 19, 2nd booster 6 weeks ago, flu vaccine 6 weeks ago, PNA and shingles vaccinations (2 series)     RTC in 4 months    Plan, including risks and benefits, was discussed with the patient, informed on how to reach us.     To schedule an appointment, call [phone]  To reach the rheumatology office, call [phone]    Trice De Los Santos MD   Division of Rheumatology  [facility]

## 2023-11-20 ENCOUNTER — LAB (OUTPATIENT)
Dept: LAB | Facility: LAB | Age: 83
End: 2023-11-20
Payer: MEDICARE

## 2023-11-20 ENCOUNTER — OFFICE VISIT (OUTPATIENT)
Dept: RHEUMATOLOGY | Facility: CLINIC | Age: 83
End: 2023-11-20
Payer: MEDICARE

## 2023-11-20 VITALS
WEIGHT: 241 LBS | HEART RATE: 72 BPM | SYSTOLIC BLOOD PRESSURE: 104 MMHG | BODY MASS INDEX: 31.94 KG/M2 | HEIGHT: 73 IN | DIASTOLIC BLOOD PRESSURE: 68 MMHG

## 2023-11-20 DIAGNOSIS — Z79.60 LONG-TERM USE OF IMMUNOSUPPRESSANT MEDICATION: ICD-10-CM

## 2023-11-20 DIAGNOSIS — M05.79 RHEUMATOID ARTHRITIS INVOLVING MULTIPLE SITES WITH POSITIVE RHEUMATOID FACTOR (MULTI): ICD-10-CM

## 2023-11-20 DIAGNOSIS — R91.8 MULTIPLE PULMONARY NODULES DETERMINED BY COMPUTED TOMOGRAPHY OF LUNG: ICD-10-CM

## 2023-11-20 DIAGNOSIS — H54.7 VISION PROBLEM: ICD-10-CM

## 2023-11-20 DIAGNOSIS — M05.79 RHEUMATOID ARTHRITIS INVOLVING MULTIPLE SITES WITH POSITIVE RHEUMATOID FACTOR (MULTI): Primary | ICD-10-CM

## 2023-11-20 DIAGNOSIS — E55.9 VITAMIN D DEFICIENCY: ICD-10-CM

## 2023-11-20 LAB
ALBUMIN SERPL BCP-MCNC: 4 G/DL (ref 3.4–5)
ALP SERPL-CCNC: 53 U/L (ref 33–136)
ALT SERPL W P-5'-P-CCNC: 15 U/L (ref 10–52)
ANION GAP SERPL CALC-SCNC: 11 MMOL/L (ref 10–20)
AST SERPL W P-5'-P-CCNC: 17 U/L (ref 9–39)
BASOPHILS # BLD AUTO: 0.07 X10*3/UL (ref 0–0.1)
BASOPHILS NFR BLD AUTO: 1.1 %
BILIRUB SERPL-MCNC: 0.4 MG/DL (ref 0–1.2)
BUN SERPL-MCNC: 20 MG/DL (ref 6–23)
CALCIUM SERPL-MCNC: 9 MG/DL (ref 8.6–10.3)
CHLORIDE SERPL-SCNC: 107 MMOL/L (ref 98–107)
CO2 SERPL-SCNC: 24 MMOL/L (ref 21–32)
CREAT SERPL-MCNC: 1.36 MG/DL (ref 0.5–1.3)
CRP SERPL-MCNC: 0.41 MG/DL
EOSINOPHIL # BLD AUTO: 0.35 X10*3/UL (ref 0–0.4)
EOSINOPHIL NFR BLD AUTO: 5.7 %
ERYTHROCYTE [DISTWIDTH] IN BLOOD BY AUTOMATED COUNT: 12.5 % (ref 11.5–14.5)
ERYTHROCYTE [SEDIMENTATION RATE] IN BLOOD BY WESTERGREN METHOD: 15 MM/H (ref 0–20)
GFR SERPL CREATININE-BSD FRML MDRD: 52 ML/MIN/1.73M*2
GLUCOSE SERPL-MCNC: 98 MG/DL (ref 74–99)
HCT VFR BLD AUTO: 37.5 % (ref 41–52)
HGB BLD-MCNC: 11.9 G/DL (ref 13.5–17.5)
IMM GRANULOCYTES # BLD AUTO: 0.02 X10*3/UL (ref 0–0.5)
IMM GRANULOCYTES NFR BLD AUTO: 0.3 % (ref 0–0.9)
LYMPHOCYTES # BLD AUTO: 1.81 X10*3/UL (ref 0.8–3)
LYMPHOCYTES NFR BLD AUTO: 29.4 %
MCH RBC QN AUTO: 29.1 PG (ref 26–34)
MCHC RBC AUTO-ENTMCNC: 31.7 G/DL (ref 32–36)
MCV RBC AUTO: 92 FL (ref 80–100)
MONOCYTES # BLD AUTO: 0.8 X10*3/UL (ref 0.05–0.8)
MONOCYTES NFR BLD AUTO: 13 %
NEUTROPHILS # BLD AUTO: 3.11 X10*3/UL (ref 1.6–5.5)
NEUTROPHILS NFR BLD AUTO: 50.5 %
NRBC BLD-RTO: 0 /100 WBCS (ref 0–0)
PLATELET # BLD AUTO: 200 X10*3/UL (ref 150–450)
POTASSIUM SERPL-SCNC: 4.4 MMOL/L (ref 3.5–5.3)
PROT SERPL-MCNC: 6.2 G/DL (ref 6.4–8.2)
RBC # BLD AUTO: 4.09 X10*6/UL (ref 4.5–5.9)
SODIUM SERPL-SCNC: 138 MMOL/L (ref 136–145)
WBC # BLD AUTO: 6.2 X10*3/UL (ref 4.4–11.3)

## 2023-11-20 PROCEDURE — 1160F RVW MEDS BY RX/DR IN RCRD: CPT | Performed by: STUDENT IN AN ORGANIZED HEALTH CARE EDUCATION/TRAINING PROGRAM

## 2023-11-20 PROCEDURE — 86140 C-REACTIVE PROTEIN: CPT

## 2023-11-20 PROCEDURE — 1036F TOBACCO NON-USER: CPT | Performed by: STUDENT IN AN ORGANIZED HEALTH CARE EDUCATION/TRAINING PROGRAM

## 2023-11-20 PROCEDURE — 3078F DIAST BP <80 MM HG: CPT | Performed by: STUDENT IN AN ORGANIZED HEALTH CARE EDUCATION/TRAINING PROGRAM

## 2023-11-20 PROCEDURE — 85025 COMPLETE CBC W/AUTO DIFF WBC: CPT

## 2023-11-20 PROCEDURE — 80053 COMPREHEN METABOLIC PANEL: CPT

## 2023-11-20 PROCEDURE — 3074F SYST BP LT 130 MM HG: CPT | Performed by: STUDENT IN AN ORGANIZED HEALTH CARE EDUCATION/TRAINING PROGRAM

## 2023-11-20 PROCEDURE — 36415 COLL VENOUS BLD VENIPUNCTURE: CPT

## 2023-11-20 PROCEDURE — 85652 RBC SED RATE AUTOMATED: CPT

## 2023-11-20 PROCEDURE — 1159F MED LIST DOCD IN RCRD: CPT | Performed by: STUDENT IN AN ORGANIZED HEALTH CARE EDUCATION/TRAINING PROGRAM

## 2023-11-20 PROCEDURE — 99214 OFFICE O/P EST MOD 30 MIN: CPT | Performed by: STUDENT IN AN ORGANIZED HEALTH CARE EDUCATION/TRAINING PROGRAM

## 2023-11-30 DIAGNOSIS — E78.5 HYPERLIPIDEMIA, UNSPECIFIED HYPERLIPIDEMIA TYPE: Primary | ICD-10-CM

## 2023-11-30 RX ORDER — LOVASTATIN 20 MG/1
20 TABLET ORAL DAILY
Qty: 90 TABLET | Refills: 3 | Status: SHIPPED | OUTPATIENT
Start: 2023-11-30

## 2023-12-28 DIAGNOSIS — F41.9 ANXIETY: Primary | ICD-10-CM

## 2023-12-28 RX ORDER — CITALOPRAM 20 MG/1
20 TABLET, FILM COATED ORAL DAILY
Qty: 90 TABLET | Refills: 3 | Status: SHIPPED | OUTPATIENT
Start: 2023-12-28

## 2024-01-03 ENCOUNTER — TELEPHONE (OUTPATIENT)
Dept: RHEUMATOLOGY | Facility: CLINIC | Age: 84
End: 2024-01-03

## 2024-01-03 DIAGNOSIS — M06.9 RHEUMATOID ARTHRITIS INVOLVING MULTIPLE SITES, UNSPECIFIED WHETHER RHEUMATOID FACTOR PRESENT (MULTI): Primary | ICD-10-CM

## 2024-01-03 RX ORDER — LEFLUNOMIDE 20 MG/1
20 TABLET ORAL DAILY
Qty: 90 TABLET | Refills: 1 | Status: SHIPPED | OUTPATIENT
Start: 2024-01-03 | End: 2024-07-01

## 2024-01-03 NOTE — TELEPHONE ENCOUNTER
[facility] called in a refill for this patient for Leflunomide 20 mg tablets. Ref# [id number].    Please advise. [phone]

## 2024-01-10 ENCOUNTER — HOSPITAL ENCOUNTER (OUTPATIENT)
Dept: RADIOLOGY | Facility: HOSPITAL | Age: 84
Discharge: HOME | End: 2024-01-10
Payer: MEDICARE

## 2024-01-10 DIAGNOSIS — R91.8 OTHER NONSPECIFIC ABNORMAL FINDING OF LUNG FIELD: ICD-10-CM

## 2024-01-10 PROCEDURE — 71250 CT THORAX DX C-: CPT

## 2024-01-10 PROCEDURE — 71250 CT THORAX DX C-: CPT | Performed by: RADIOLOGY

## 2024-01-17 ENCOUNTER — OFFICE VISIT (OUTPATIENT)
Dept: PULMONOLOGY | Facility: CLINIC | Age: 84
End: 2024-01-17
Payer: MEDICARE

## 2024-01-17 VITALS
HEART RATE: 76 BPM | RESPIRATION RATE: 16 BRPM | BODY MASS INDEX: 32.63 KG/M2 | DIASTOLIC BLOOD PRESSURE: 71 MMHG | TEMPERATURE: 97.6 F | WEIGHT: 246.2 LBS | OXYGEN SATURATION: 93 % | SYSTOLIC BLOOD PRESSURE: 102 MMHG | HEIGHT: 73 IN

## 2024-01-17 DIAGNOSIS — R91.8 LUNG NODULES: Primary | ICD-10-CM

## 2024-01-17 PROCEDURE — 3074F SYST BP LT 130 MM HG: CPT | Performed by: INTERNAL MEDICINE

## 2024-01-17 PROCEDURE — 3078F DIAST BP <80 MM HG: CPT | Performed by: INTERNAL MEDICINE

## 2024-01-17 PROCEDURE — 1126F AMNT PAIN NOTED NONE PRSNT: CPT | Performed by: INTERNAL MEDICINE

## 2024-01-17 PROCEDURE — 99214 OFFICE O/P EST MOD 30 MIN: CPT | Performed by: INTERNAL MEDICINE

## 2024-01-17 PROCEDURE — 1160F RVW MEDS BY RX/DR IN RCRD: CPT | Performed by: INTERNAL MEDICINE

## 2024-01-17 PROCEDURE — 1036F TOBACCO NON-USER: CPT | Performed by: INTERNAL MEDICINE

## 2024-01-17 PROCEDURE — 1159F MED LIST DOCD IN RCRD: CPT | Performed by: INTERNAL MEDICINE

## 2024-01-17 ASSESSMENT — ENCOUNTER SYMPTOMS
LOSS OF SENSATION IN FEET: 0
DEPRESSION: 0
OCCASIONAL FEELINGS OF UNSTEADINESS: 0

## 2024-01-17 ASSESSMENT — COLUMBIA-SUICIDE SEVERITY RATING SCALE - C-SSRS
1. IN THE PAST MONTH, HAVE YOU WISHED YOU WERE DEAD OR WISHED YOU COULD GO TO SLEEP AND NOT WAKE UP?: NO
2. HAVE YOU ACTUALLY HAD ANY THOUGHTS OF KILLING YOURSELF?: NO
6. HAVE YOU EVER DONE ANYTHING, STARTED TO DO ANYTHING, OR PREPARED TO DO ANYTHING TO END YOUR LIFE?: NO

## 2024-01-17 ASSESSMENT — PATIENT HEALTH QUESTIONNAIRE - PHQ9
1. LITTLE INTEREST OR PLEASURE IN DOING THINGS: NOT AT ALL
2. FEELING DOWN, DEPRESSED OR HOPELESS: NOT AT ALL
SUM OF ALL RESPONSES TO PHQ9 QUESTIONS 1 AND 2: 0

## 2024-01-17 ASSESSMENT — PAIN SCALES - GENERAL: PAINLEVEL: 0-NO PAIN

## 2024-01-21 ENCOUNTER — APPOINTMENT (OUTPATIENT)
Dept: RADIOLOGY | Facility: HOSPITAL | Age: 84
End: 2024-01-21
Payer: MEDICARE

## 2024-01-21 ENCOUNTER — HOSPITAL ENCOUNTER (EMERGENCY)
Facility: HOSPITAL | Age: 84
Discharge: HOME | End: 2024-01-21
Attending: STUDENT IN AN ORGANIZED HEALTH CARE EDUCATION/TRAINING PROGRAM
Payer: MEDICARE

## 2024-01-21 ENCOUNTER — APPOINTMENT (OUTPATIENT)
Dept: CARDIOLOGY | Facility: HOSPITAL | Age: 84
End: 2024-01-21
Payer: MEDICARE

## 2024-01-21 VITALS
WEIGHT: 240 LBS | HEIGHT: 73 IN | BODY MASS INDEX: 31.81 KG/M2 | SYSTOLIC BLOOD PRESSURE: 120 MMHG | HEART RATE: 77 BPM | DIASTOLIC BLOOD PRESSURE: 59 MMHG | RESPIRATION RATE: 16 BRPM | TEMPERATURE: 97.2 F | OXYGEN SATURATION: 93 %

## 2024-01-21 DIAGNOSIS — M62.838 MUSCLE SPASM: Primary | ICD-10-CM

## 2024-01-21 LAB
ABO GROUP (TYPE) IN BLOOD: NORMAL
ALBUMIN SERPL BCP-MCNC: 4.4 G/DL (ref 3.4–5)
ALP SERPL-CCNC: 61 U/L (ref 33–136)
ALT SERPL W P-5'-P-CCNC: 14 U/L (ref 10–52)
ANION GAP SERPL CALC-SCNC: 15 MMOL/L (ref 10–20)
ANTIBODY SCREEN: NORMAL
APPEARANCE UR: CLEAR
AST SERPL W P-5'-P-CCNC: 20 U/L (ref 9–39)
BASOPHILS # BLD AUTO: 0.05 X10*3/UL (ref 0–0.1)
BASOPHILS NFR BLD AUTO: 0.4 %
BILIRUB SERPL-MCNC: 0.4 MG/DL (ref 0–1.2)
BILIRUB UR STRIP.AUTO-MCNC: NEGATIVE MG/DL
BNP SERPL-MCNC: 70 PG/ML (ref 0–99)
BUN SERPL-MCNC: 23 MG/DL (ref 6–23)
CALCIUM SERPL-MCNC: 9.3 MG/DL (ref 8.6–10.3)
CARDIAC TROPONIN I PNL SERPL HS: 18 NG/L (ref 0–20)
CARDIAC TROPONIN I PNL SERPL HS: 20 NG/L (ref 0–20)
CHLORIDE SERPL-SCNC: 104 MMOL/L (ref 98–107)
CO2 SERPL-SCNC: 24 MMOL/L (ref 21–32)
COLOR UR: YELLOW
CREAT SERPL-MCNC: 1.35 MG/DL (ref 0.5–1.3)
EGFRCR SERPLBLD CKD-EPI 2021: 53 ML/MIN/1.73M*2
EOSINOPHIL # BLD AUTO: 0.34 X10*3/UL (ref 0–0.4)
EOSINOPHIL NFR BLD AUTO: 3.1 %
ERYTHROCYTE [DISTWIDTH] IN BLOOD BY AUTOMATED COUNT: 12.5 % (ref 11.5–14.5)
GLUCOSE SERPL-MCNC: 93 MG/DL (ref 74–99)
GLUCOSE UR STRIP.AUTO-MCNC: NEGATIVE MG/DL
HCT VFR BLD AUTO: 40.5 % (ref 41–52)
HGB BLD-MCNC: 12.9 G/DL (ref 13.5–17.5)
IMM GRANULOCYTES # BLD AUTO: 0.04 X10*3/UL (ref 0–0.5)
IMM GRANULOCYTES NFR BLD AUTO: 0.4 % (ref 0–0.9)
INR PPP: 1 (ref 0.9–1.1)
KETONES UR STRIP.AUTO-MCNC: NEGATIVE MG/DL
LEUKOCYTE ESTERASE UR QL STRIP.AUTO: NEGATIVE
LIPASE SERPL-CCNC: 23 U/L (ref 9–82)
LYMPHOCYTES # BLD AUTO: 2.46 X10*3/UL (ref 0.8–3)
LYMPHOCYTES NFR BLD AUTO: 22.1 %
MCH RBC QN AUTO: 28.9 PG (ref 26–34)
MCHC RBC AUTO-ENTMCNC: 31.9 G/DL (ref 32–36)
MCV RBC AUTO: 91 FL (ref 80–100)
MONOCYTES # BLD AUTO: 1.01 X10*3/UL (ref 0.05–0.8)
MONOCYTES NFR BLD AUTO: 9.1 %
NEUTROPHILS # BLD AUTO: 7.24 X10*3/UL (ref 1.6–5.5)
NEUTROPHILS NFR BLD AUTO: 64.9 %
NITRITE UR QL STRIP.AUTO: NEGATIVE
NRBC BLD-RTO: 0 /100 WBCS (ref 0–0)
PH UR STRIP.AUTO: 5 [PH]
PLATELET # BLD AUTO: 246 X10*3/UL (ref 150–450)
POTASSIUM SERPL-SCNC: 4.7 MMOL/L (ref 3.5–5.3)
PROT SERPL-MCNC: 7.6 G/DL (ref 6.4–8.2)
PROT UR STRIP.AUTO-MCNC: ABNORMAL MG/DL
PROTHROMBIN TIME: 11.7 SECONDS (ref 9.8–12.8)
RBC # BLD AUTO: 4.46 X10*6/UL (ref 4.5–5.9)
RBC # UR STRIP.AUTO: NEGATIVE /UL
RBC #/AREA URNS AUTO: NORMAL /HPF
RH FACTOR (ANTIGEN D): NORMAL
SODIUM SERPL-SCNC: 138 MMOL/L (ref 136–145)
SP GR UR STRIP.AUTO: 1.03
UROBILINOGEN UR STRIP.AUTO-MCNC: <2 MG/DL
WBC # BLD AUTO: 11.1 X10*3/UL (ref 4.4–11.3)
WBC #/AREA URNS AUTO: NORMAL /HPF

## 2024-01-21 PROCEDURE — 84484 ASSAY OF TROPONIN QUANT: CPT | Performed by: STUDENT IN AN ORGANIZED HEALTH CARE EDUCATION/TRAINING PROGRAM

## 2024-01-21 PROCEDURE — 81001 URINALYSIS AUTO W/SCOPE: CPT

## 2024-01-21 PROCEDURE — 83690 ASSAY OF LIPASE: CPT

## 2024-01-21 PROCEDURE — 85025 COMPLETE CBC W/AUTO DIFF WBC: CPT | Performed by: STUDENT IN AN ORGANIZED HEALTH CARE EDUCATION/TRAINING PROGRAM

## 2024-01-21 PROCEDURE — 99285 EMERGENCY DEPT VISIT HI MDM: CPT | Performed by: STUDENT IN AN ORGANIZED HEALTH CARE EDUCATION/TRAINING PROGRAM

## 2024-01-21 PROCEDURE — 71045 X-RAY EXAM CHEST 1 VIEW: CPT | Mod: FOREIGN READ | Performed by: RADIOLOGY

## 2024-01-21 PROCEDURE — 86900 BLOOD TYPING SEROLOGIC ABO: CPT | Mod: 91

## 2024-01-21 PROCEDURE — 2550000001 HC RX 255 CONTRASTS: Performed by: STUDENT IN AN ORGANIZED HEALTH CARE EDUCATION/TRAINING PROGRAM

## 2024-01-21 PROCEDURE — 85610 PROTHROMBIN TIME: CPT | Performed by: STUDENT IN AN ORGANIZED HEALTH CARE EDUCATION/TRAINING PROGRAM

## 2024-01-21 PROCEDURE — 83880 ASSAY OF NATRIURETIC PEPTIDE: CPT | Performed by: STUDENT IN AN ORGANIZED HEALTH CARE EDUCATION/TRAINING PROGRAM

## 2024-01-21 PROCEDURE — 71275 CT ANGIOGRAPHY CHEST: CPT

## 2024-01-21 PROCEDURE — 2500000004 HC RX 250 GENERAL PHARMACY W/ HCPCS (ALT 636 FOR OP/ED)

## 2024-01-21 PROCEDURE — 36415 COLL VENOUS BLD VENIPUNCTURE: CPT | Performed by: STUDENT IN AN ORGANIZED HEALTH CARE EDUCATION/TRAINING PROGRAM

## 2024-01-21 PROCEDURE — 71045 X-RAY EXAM CHEST 1 VIEW: CPT

## 2024-01-21 PROCEDURE — 36415 COLL VENOUS BLD VENIPUNCTURE: CPT

## 2024-01-21 PROCEDURE — 80053 COMPREHEN METABOLIC PANEL: CPT | Performed by: STUDENT IN AN ORGANIZED HEALTH CARE EDUCATION/TRAINING PROGRAM

## 2024-01-21 PROCEDURE — 96372 THER/PROPH/DIAG INJ SC/IM: CPT

## 2024-01-21 PROCEDURE — 71275 CT ANGIOGRAPHY CHEST: CPT | Performed by: RADIOLOGY

## 2024-01-21 PROCEDURE — 93005 ELECTROCARDIOGRAM TRACING: CPT

## 2024-01-21 PROCEDURE — 99285 EMERGENCY DEPT VISIT HI MDM: CPT | Mod: 25

## 2024-01-21 PROCEDURE — 74174 CTA ABD&PLVS W/CONTRAST: CPT | Performed by: RADIOLOGY

## 2024-01-21 RX ORDER — KETOROLAC TROMETHAMINE 15 MG/ML
15 INJECTION, SOLUTION INTRAMUSCULAR; INTRAVENOUS ONCE
Status: COMPLETED | OUTPATIENT
Start: 2024-01-21 | End: 2024-01-21

## 2024-01-21 RX ORDER — ACETAMINOPHEN 325 MG/1
975 TABLET ORAL ONCE
Status: COMPLETED | OUTPATIENT
Start: 2024-01-21 | End: 2024-01-21

## 2024-01-21 RX ORDER — LIDOCAINE 50 MG/G
1 PATCH TOPICAL DAILY
Qty: 10 PATCH | Refills: 0 | Status: SHIPPED | OUTPATIENT
Start: 2024-01-21

## 2024-01-21 RX ORDER — ORPHENADRINE CITRATE 100 MG/1
100 TABLET, EXTENDED RELEASE ORAL 2 TIMES DAILY PRN
Qty: 20 TABLET | Refills: 0 | Status: SHIPPED | OUTPATIENT
Start: 2024-01-21 | End: 2024-03-05

## 2024-01-21 RX ORDER — ORPHENADRINE CITRATE 30 MG/ML
60 INJECTION INTRAMUSCULAR; INTRAVENOUS ONCE
Status: COMPLETED | OUTPATIENT
Start: 2024-01-21 | End: 2024-01-21

## 2024-01-21 RX ADMIN — ACETAMINOPHEN 975 MG: 325 TABLET ORAL at 19:36

## 2024-01-21 RX ADMIN — KETOROLAC TROMETHAMINE 15 MG: 15 INJECTION, SOLUTION INTRAMUSCULAR; INTRAVENOUS at 19:37

## 2024-01-21 RX ADMIN — ORPHENADRINE CITRATE 60 MG: 60 INJECTION INTRAMUSCULAR; INTRAVENOUS at 19:37

## 2024-01-21 RX ADMIN — IOHEXOL 90 ML: 350 INJECTION, SOLUTION INTRAVENOUS at 17:42

## 2024-01-21 ASSESSMENT — LIFESTYLE VARIABLES
HAVE YOU EVER FELT YOU SHOULD CUT DOWN ON YOUR DRINKING: NO
EVER FELT BAD OR GUILTY ABOUT YOUR DRINKING: NO
REASON UNABLE TO ASSESS: NO
EVER HAD A DRINK FIRST THING IN THE MORNING TO STEADY YOUR NERVES TO GET RID OF A HANGOVER: NO
HAVE PEOPLE ANNOYED YOU BY CRITICIZING YOUR DRINKING: NO

## 2024-01-21 ASSESSMENT — PAIN DESCRIPTION - LOCATION: LOCATION: ABDOMEN

## 2024-01-21 ASSESSMENT — PAIN DESCRIPTION - ORIENTATION: ORIENTATION: RIGHT

## 2024-01-21 ASSESSMENT — PAIN - FUNCTIONAL ASSESSMENT: PAIN_FUNCTIONAL_ASSESSMENT: 0-10

## 2024-01-21 ASSESSMENT — PAIN SCALES - GENERAL
PAINLEVEL_OUTOF10: 0 - NO PAIN
PAINLEVEL_OUTOF10: 0 - NO PAIN
PAINLEVEL_OUTOF10: 5 - MODERATE PAIN
PAINLEVEL_OUTOF10: 9

## 2024-01-21 NOTE — ED PROVIDER NOTES
EMERGENCY DEPARTMENT ENCOUNTER      Pt Name: Maurice Tripp  MRN: [id number]  Birthdate 1940  Date of evaluation: 1/21/2024  Provider: Colin Salinas DO    CHIEF COMPLAINT       Chief Complaint   Patient presents with    Abdominal Pain     Right side radiates to back, painful to take a breath, SOB, started this morning - took Tylenol 0800          HISTORY OF PRESENT ILLNESS    HPI    83-year-old male with past medical history significant for lung nodules currently being worked up for possible lung cancer, rheumatoid arthritis, hypertension, hyperlipidemia, SHA recently started on CPAP presenting to the emergency department for evaluation of acute onset shortness of breath, right lower back and right lower quadrant abdominal pain which started at 730 this morning.  Patient denies any chest pain but states he has has difficulty taking a deep breath.  Denies fever, chills, night sweats, nausea, vomiting, hemoptysis, recent surgery,, trauma, blood thinners, bowel/bladder incontinence, saddle anesthesia.    Nursing Notes were reviewed.    PAST MEDICAL HISTORY     Past Medical History:   Diagnosis Date    Acute bronchitis 05/12/2023    Acute non-recurrent maxillary sinusitis 05/12/2023    Alcohol dependence (CMS/HCC) 05/12/2023    BRBPR (bright red blood per rectum) 05/12/2023    Fungal nail infection 05/12/2023    Right cervical radiculopathy 05/12/2023    Subacute cough 05/12/2023         SURGICAL HISTORY       Past Surgical History:   Procedure Laterality Date    CT GUIDED PERCUTANEOUS BIOPSY LUNG  2/10/2023    CT GUIDED PERCUTANEOUS BIOPSY LUNG 2/10/2023 DOCTOR OFFICE [facility]         CURRENT MEDICATIONS       Discharge Medication List as of 1/21/2024  8:35 PM        CONTINUE these medications which have NOT CHANGED    Details   aspirin 81 mg EC tablet Take 1 tablet (81 mg) by mouth once daily., Historical Med      citalopram (CeleXA) 20 mg tablet TAKE 1 TABLET DAILY, Starting Thu 12/28/2023, Normal       cyanocobalamin (Vitamin B-12) 500 mcg tablet 1 tablet (500 mcg)., Starting Sun 2/2/2020, Historical Med      folic acid (Folvite) 1 mg tablet Take 1 tablet (1 mg) by mouth once daily., Historical Med      hydroxychloroquine (Plaquenil) 200 mg tablet Take 2 tablets (400 mg) by mouth once daily., Starting Wed 8/23/2023, Normal      leflunomide (Arava) 20 mg tablet Take 1 tablet (20 mg) by mouth once daily., Starting Wed 1/3/2024, Until Mon 7/1/2024, Normal      losartan (Cozaar) 100 mg tablet losartan 100 mg tablet   TAKE 1/2 TABLET BY MOUTH DAILY, Historical Med      lovastatin (Mevacor) 20 mg tablet TAKE 1 TABLET DAILY AS DIRECTED, Starting Thu 11/30/2023, Normal      omeprazole (PriLOSEC) 10 mg DR capsule TAKE 1 CAPSULE DAILY, Starting Fri 9/29/2023, Normal             ALLERGIES     Patient has no known allergies.    FAMILY HISTORY     No family history on file.       SOCIAL HISTORY       Social History     Socioeconomic History    Marital status:      Spouse name: Not on file    Number of children: Not on file    Years of education: Not on file    Highest education level: Not on file   Occupational History    Not on file   Tobacco Use    Smoking status: Never    Smokeless tobacco: Never   Substance and Sexual Activity    Alcohol use: Not on file    Drug use: Not on file    Sexual activity: Not on file   Other Topics Concern    Not on file   Social History Narrative    Not on file     Social Determinants of Health     Financial Resource Strain: Low Risk  (8/14/2023)    Overall Financial Resource Strain (CARDIA)     Difficulty of Paying Living Expenses: Not hard at all   Food Insecurity: No Food Insecurity (8/14/2023)    Hunger Vital Sign     Worried About Running Out of Food in the Last Year: Never true     Ran Out of Food in the Last Year: Never true   Transportation Needs: No Transportation Needs (8/14/2023)    PRAPARE - Transportation     Lack of Transportation (Medical): No     Lack of Transportation  (Non-Medical): No   Physical Activity: Inactive (8/14/2023)    Exercise Vital Sign     Days of Exercise per Week: 0 days     Minutes of Exercise per Session: 0 min   Stress: No Stress Concern Present (8/14/2023)    [employer] [employer] - Occupational Stress Questionnaire     Feeling of Stress : Not at all   Social Connections: Moderately Integrated (8/14/2023)    Social Connection and Isolation Panel [NHANES]     Frequency of Communication with Friends and Family: Three times a week     Frequency of Social Gatherings with Friends and Family: Once a week     Attends Cheondoism Services: Never     Active Member of Clubs or Organizations: Yes     Attends Club or Organization Meetings: More than 4 times per year     Marital Status:    Intimate Partner Violence: Not At Risk (8/14/2023)    Humiliation, Afraid, Rape, and Kick questionnaire     Fear of Current or Ex-Partner: No     Emotionally Abused: No     Physically Abused: No     Sexually Abused: No   Housing Stability: Unknown (8/14/2023)    Housing Stability Vital Sign     Unable to Pay for Housing in the Last Year: No     Number of Places Lived in the Last Year: Not on file     Unstable Housing in the Last Year: No       SCREENINGS                        PHYSICAL EXAM    (up to 7 for level 4, 8 or more for level 5)     ED Triage Vitals [01/21/24 1716]   Temp Heart Rate Respirations BP   36.2 °C (97.2 °F) 52 18 141/72      Pulse Ox Temp Source Heart Rate Source Patient Position   98 % Temporal -- --      BP Location FiO2 (%)     -- --       Physical Exam  Constitutional:       General: He is not in acute distress.     Appearance: He is obese. He is not ill-appearing, toxic-appearing or diaphoretic.   HENT:      Head: Normocephalic and atraumatic.      Mouth/Throat:      Mouth: Mucous membranes are moist.      Pharynx: Oropharynx is clear. No pharyngeal swelling or oropharyngeal exudate.   Eyes:      General: No scleral icterus.      Extraocular Movements: Extraocular movements intact.      Pupils: Pupils are equal, round, and reactive to light.   Cardiovascular:      Rate and Rhythm: Normal rate and regular rhythm.      Pulses:           Radial pulses are 2+ on the right side and 2+ on the left side.        Dorsalis pedis pulses are 2+ on the right side and 1+ on the left side.        Posterior tibial pulses are 2+ on the right side and 1+ on the left side.      Heart sounds: Normal heart sounds. No murmur heard.     No friction rub. No gallop.   Pulmonary:      Effort: No respiratory distress.      Breath sounds: No stridor. No wheezing, rhonchi or rales.      Comments: Patient unable to take a deep breath, however is states it is not due to chest pain or abdominal pain he is unable to vocalize why he is unable to take a deep breath.  Chest:      Chest wall: No tenderness.   Abdominal:      General: Abdomen is flat. There is no distension. There are no signs of injury.      Palpations: Abdomen is soft. There is no shifting dullness, fluid wave, hepatomegaly, splenomegaly, mass or pulsatile mass.      Tenderness: There is abdominal tenderness in the right upper quadrant and right lower quadrant. There is no right CVA tenderness, left CVA tenderness, guarding or rebound. Negative signs include Emanuel's sign, Rovsing's sign, McBurney's sign, psoas sign and obturator sign.      Hernia: No hernia is present.   Skin:     General: Skin is warm and dry.      Coloration: Skin is not cyanotic, jaundiced, mottled or pale.      Findings: No erythema or rash.   Neurological:      General: No focal deficit present.      Mental Status: He is alert and oriented to person, place, and time.   Psychiatric:         Mood and Affect: Mood normal.         Behavior: Behavior normal.          DIAGNOSTIC RESULTS     LABS:  Labs Reviewed   CBC WITH AUTO DIFFERENTIAL - Abnormal       Result Value    WBC 11.1      nRBC 0.0      RBC 4.46 (*)     Hemoglobin 12.9 (*)      Hematocrit 40.5 (*)     MCV 91      MCH 28.9      MCHC 31.9 (*)     RDW 12.5      Platelets 246      Neutrophils % 64.9      Immature Granulocytes %, Automated 0.4      Lymphocytes % 22.1      Monocytes % 9.1      Eosinophils % 3.1      Basophils % 0.4      Neutrophils Absolute 7.24 (*)     Immature Granulocytes Absolute, Automated 0.04      Lymphocytes Absolute 2.46      Monocytes Absolute 1.01 (*)     Eosinophils Absolute 0.34      Basophils Absolute 0.05     COMPREHENSIVE METABOLIC PANEL - Abnormal    Glucose 93      Sodium 138      Potassium 4.7      Chloride 104      Bicarbonate 24      Anion Gap 15      Urea Nitrogen 23      Creatinine 1.35 (*)     eGFR 53 (*)     Calcium 9.3      Albumin 4.4      Alkaline Phosphatase 61      Total Protein 7.6      AST 20      Bilirubin, Total 0.4      ALT 14     URINALYSIS WITH REFLEX MICROSCOPIC - Abnormal    Color, Urine Yellow      Appearance, Urine Clear      Specific Gravity, Urine 1.034      pH, Urine 5.0      Protein, Urine 30 (1+) (*)     Glucose, Urine NEGATIVE      Blood, Urine NEGATIVE      Ketones, Urine NEGATIVE      Bilirubin, Urine NEGATIVE      Urobilinogen, Urine <2.0      Nitrite, Urine NEGATIVE      Leukocyte Esterase, Urine NEGATIVE     B-TYPE NATRIURETIC PEPTIDE - Normal    BNP 70      Narrative:        <100 pg/mL - Heart failure unlikely  100-299 pg/mL - Intermediate probability of acute heart                  failure exacerbation. Correlate with clinical                  context and patient history.    >=300 pg/mL - Heart Failure likely. Correlate with clinical                  context and patient history.    BNP testing is performed using different testing methodology at [facility] than at other [facility]. Direct result comparisons should only be made within the same method.      PROTIME-INR - Normal    Protime 11.7      INR 1.0     SERIAL TROPONIN-INITIAL - Normal    Troponin I, High Sensitivity 18      Narrative:     Less than  99th percentile of normal range cutoff-  Female and children under 18 years old <14 ng/L; Male <21 ng/L: Negative  Repeat testing should be performed if clinically indicated.     Female and children under 18 years old 14-50 ng/L; Male 21-50 ng/L:  Consistent with possible cardiac damage and possible increased clinical   risk. Serial measurements may help to assess extent of myocardial damage.     >50 ng/L: Consistent with cardiac damage, increased clinical risk and  myocardial infarction. Serial measurements may help assess extent of   myocardial damage.      NOTE: Children less than 1 year old may have higher baseline troponin   levels and results should be interpreted in conjunction with the overall   clinical context.     NOTE: Troponin I testing is performed using a different   testing methodology at [facility] than at other   [facility]. Direct result comparisons should only   be made within the same method.   SERIAL TROPONIN, 1 HOUR - Normal    Troponin I, High Sensitivity 20      Narrative:     Less than 99th percentile of normal range cutoff-  Female and children under 18 years old <14 ng/L; Male <21 ng/L: Negative  Repeat testing should be performed if clinically indicated.     Female and children under 18 years old 14-50 ng/L; Male 21-50 ng/L:  Consistent with possible cardiac damage and possible increased clinical   risk. Serial measurements may help to assess extent of myocardial damage.     >50 ng/L: Consistent with cardiac damage, increased clinical risk and  myocardial infarction. Serial measurements may help assess extent of   myocardial damage.      NOTE: Children less than 1 year old may have higher baseline troponin   levels and results should be interpreted in conjunction with the overall   clinical context.     NOTE: Troponin I testing is performed using a different   testing methodology at [facility] than at other   [facility]. Direct result comparisons  should only   be made within the same method.   LIPASE - Normal    Lipase 23      Narrative:     Venipuncture immediately after or during the administration of Metamizole may lead to falsely low results. Testing should be performed immediately prior to Metamizole dosing.   MICROSCOPIC ONLY, URINE - Normal    WBC, Urine NONE      RBC, Urine NONE     TROPONIN SERIES- (INITIAL, 1 HR)    Narrative:     The following orders were created for panel order Troponin Series, (0, 1 HR).  Procedure                               Abnormality         Status                     ---------                               -----------         ------                     Troponin I, High Sensiti...[[id number]]  Normal              Final result               Troponin, High Sensitivi...[[id number]]  Normal              Final result                 Please view results for these tests on the individual orders.   TYPE AND SCREEN    ABO TYPE O      Rh TYPE NEG      ANTIBODY SCREEN NEG     URINE GRAY TUBE       All other labs were within normal range or not returned as of this dictation.    Imaging  XR chest 1 view   Final Result   Multiple lung nodules, no focal infiltrates seen.   Signed by Britton Kumar MD      CT angio chest abdomen pelvis   Final Result   CHEST   1. Numerous bilateral lung nodules concerning for metastatic disease.   Further evaluation with PET-CT or tissue sampling is recommended.   2. No evidence for acute pulmonary embolic disease.   3.  No CT evidence for acute aortic aneurysm or dissection.   4. Additional detailed findings as above.        ABDOMEN - PELVIS   1.  No CT evidence for acute aortic aneurysm or dissection.   2. Mild plaque burden particularly involving distal abdominal aorta   and the origin of bilateral renal arteries.   3. Subcentimeter low-attenuation lesion involving dome of the liver   which may represent a cyst but incompletely characterized in this   exam. Attention in subsequent follow-up studies  recommended.   4. Left renal cyst.   5. Prostatomegaly. Correlation with PSA levels recommended.   6. Additional detailed findings as above.             MACRO:   Critical Finding:  See findings. Notification was initiated on   1/21/2024 at 6:37 pm by  James Abrams.  (**-YCF-**) Instructions:        Signed by: James Abrams 1/21/2024 6:38 PM   Dictation workstation:   [id number]           Procedures  Procedures     EMERGENCY DEPARTMENT COURSE/MDM:     Diagnoses as of 01/22/24 0003   Muscle spasm        Medical Decision Making    83-year-old male with past medical history significant for lung nodules currently being worked up for possible lung cancer, rheumatoid arthritis, hypertension, hyperlipidemia, SHA recently started on CPAP presenting to the emergency department for evaluation of acute onset shortness of breath, right lower back and right lower quadrant abdominal pain which started at 730 this morning.  Initial triage blood pressure 141/71, vitals otherwise unremarkable.  During my exam patient's blood pressure was in the 180s systolic and bilateral extremities.  Radial pulses 2+, however DP and PT pulses in the left lower extremity 1+ and 2+ on the right concerning for possible dissection.  Chest attack was paged and patient was sent down for CT angio chest abdomen pelvis.  Cardiac workup, urinalysis, type and screen, PT/INR ordered.    EKG: Sinus rhythm with a ventricular rate of 70 bpm, KS interval 184 ms, QRS 90 ms, QTc 378 ms. [id number] suggesting possible right heart strain.  Otherwise no evidence of acute ischemic changes.     Labs unremarkable for acute pathology.  No evidence of dissection, pulmonary embolism, or other acute pathology on chest x-ray or CT angio.  The patient has no clear etiology regarding his symptoms, Toradol, Norflex, Tylenol ordered for possible muscle spasm or muscle other musculoskeletal etiology.  Patient reassessed after medications and states his symptoms have resolved.  Patient  deemed safe for discharge home with prescription for Norflex and lidocaine patches for outpatient follow-up with his primary care physician.    Patient and or family in agreement and understanding of treatment plan.  All questions answered.      I reviewed the case with the attending ED physician. The attending ED physician agrees with the plan. Patient and/or patient´s representative was counseled regarding labs, imaging, likely diagnosis, and plan. All questions were answered.    Colin Salinas DO  Emergency Medicine, PGY2    ED Medications administered this visit:    Medications   iohexol (OMNIPaque) 350 mg iodine/mL solution 90 mL (90 mL intravenous Given 1/21/24 1742)   orphenadrine (Norflex) injection 60 mg (60 mg intramuscular Given 1/21/24 1937)   acetaminophen (Tylenol) tablet 975 mg (975 mg oral Given 1/21/24 1936)   ketorolac (Toradol) injection 15 mg (15 mg intramuscular Given 1/21/24 1937)       New Prescriptions from this visit:    Discharge Medication List as of 1/21/2024  8:35 PM        START taking these medications    Details   lidocaine (Lidoderm) 5 % patch Place 1 patch over 12 hours on the skin once daily. Remove & discard patch within 12 hours or as directed by MD., Starting Sun 1/21/2024, Normal      orphenadrine (Norflex) 100 mg 12 hr tablet Take 1 tablet (100 mg) by mouth 2 times a day as needed for muscle spasms for up to 10 days. Do not crush, chew, or split., Starting Sun 1/21/2024, Until Wed 1/31/2024 at 2359, Normal             Follow-up:  Toney Almeida DO  [id number]  [facility]  [facility] [id number]  [phone]    In 1 day          Final Impression:   1. Muscle spasm          (Please note that portions of this note were completed with a voice recognition program.  Efforts were made to edit the dictations but occasionally words are mis-transcribed.)     Colin Salinas DO  Resident  01/22/24 0004

## 2024-01-22 LAB — HOLD SPECIMEN: NORMAL

## 2024-01-22 NOTE — DISCHARGE INSTRUCTIONS
Call to schedule follow-up appointment with your primary care physician preferably within the next day.  Take your prescription medications as directed.  Return to the emergency department immediately for any new or worsening symptoms such as chest pain, shortness of breath, fevers, chills, night sweats, heart palpitations, lightheadedness, or if you pass out.

## 2024-01-22 NOTE — PROGRESS NOTES
Department of Medicine  Division of Pulmonary, Critical Care, and Sleep Medicine  Follow-Up Visit  [facility] - [address], [address]    Physician HPI:  Mr. Tripp is a pleasant 83-year-old man with past medical history significant for rheumatoid arthritis on leflunomide and hydroxychloroquine who presented to the office today regarding multiple pulmonary nodules.  Maurice has no acute respiratory symptoms today.  I reviewed his most recent CT scan of chest that was done in July 2023, previous CT scan in March and January 2023 and his most recent PET/CT scan. Multiple pulmonary nodules noted the largest is 1.7 cm in size. Nodules are solid and noncalcified. It did show minimal uptake on the PET CT scan. Underwent CT-guided biopsy previously which revealed fibro inflammatory changes with few lymphoplasmacytic inflammation that were negative for IgG stain.     Follow up 1/17/2024:  Respiratory status has been stable since last visit.   Repeated CT scan of chest revealed interval increase in the size of the previously noted pulmonary nodules. In addition, a few of the nodules are now cavitary. The patient denies acute cough, fevers, chills or night sweats since last visit. No acute chest pain or hemoptysis. No interval hx of RA flares up.     Immunization History   Administered Date(s) Administered    Influenza, seasonal, injectable 10/08/2023    Moderna SARS-CoV-2 Vaccination 02/16/2021, 03/17/2021, 10/27/2021, 04/12/2022, 10/08/2023       Current Outpatient Medications   Medication Instructions    aspirin 81 mg EC tablet 1 tablet, oral, Daily    citalopram (CELEXA) 20 mg, oral, Daily    cyanocobalamin (VITAMIN B-12) 500 mcg    folic acid (Folvite) 1 mg tablet 1 tablet, oral, Daily    hydroxychloroquine (PLAQUENIL) 400 mg, oral, Daily    leflunomide (ARAVA) 20 mg, oral, Daily    lidocaine (Lidoderm) 5 % patch 1 patch, transdermal, Daily, Remove & discard patch within 12 hours or as directed by MD.    losartan (Cozaar)  "100 mg tablet losartan 100 mg tablet   TAKE 1/2 TABLET BY MOUTH DAILY    lovastatin (MEVACOR) 20 mg, oral, Daily, as directed    omeprazole (PRILOSEC) 10 mg, oral, Daily    orphenadrine (NORFLEX) 100 mg, oral, 2 times daily PRN, Do not crush, chew, or split.        Allergies:  No Known Allergies       Visit Vitals  /71 (BP Location: Left arm, Patient Position: Sitting, BP Cuff Size: Large adult)   Pulse 76   Temp 36.4 °C (97.6 °F) (Temporal)   Resp 16   Ht 1.854 m (6' 1\")   Wt 112 kg (246 lb 3.2 oz)   SpO2 93%   BMI 32.48 kg/m²   Smoking Status Never   BSA 2.4 m²      Physical Exam     Constitutional: Alert and in no acute distress. Well developed, well nourished.   Ears, Nose, Mouth, and Throat: External inspection of ears and nose: Normal.    Pulmonary: Chest: Normal to inspection. ~Respiratory effort: No increased work of breathing or signs of respiratory distress. ~Auscultation of lungs: Clear to auscultation bilaterally.    Cardiovascular: Heart rate and rhythm were normal, normal S1 and S2, no gallops, no murmurs and no pericardial rub.   Skin: Normal skin color and pigmentation, normal skin turgor, and no rash.   Psychiatric: Judgment and insight: Intact.~Alert and oriented to person, place and time.~Mood and affect: Normal.     Chest Radiograph     XR chest 1 view 01/21/2024    Narrative  STUDY:  Chest Radiograph;  1/21/2024 at 6:20 PM.  INDICATION:  Chest pain.  COMPARISON:  CT chest 1/10/2024, 7/10/2023, 3/22/2023; XR chest 10/14/2021  ACCESSION NUMBER(S):  [id number]  ORDERING CLINICIAN:  CHINO BRADY  TECHNIQUE:  Frontal chest was obtained at 18:20 hours.  FINDINGS:  CARDIOMEDIASTINAL SILHOUETTE:  Cardiomediastinal silhouette is normal in size and configuration.    LUNGS:  Bilaterally upper lobe apical nodules again noted, as well as in the  right lower lobe.  This corresponds with recent CT exam.    ABDOMEN:  No remarkable upper abdominal findings.    BONES:  No acute osseous " changes.    Impression  Multiple lung nodules, no focal infiltrates seen.  Signed by Britton Kumar MD      XR CHEST 1 VIEW 02/10/2023    Narrative  MRN: [id number]  Patient Name: YI DAMON    STUDY:  CHEST 1 VIEW;  2/10/2023 1:49 pm    INDICATION:  bilateral lung nodules. now s/p YOSI nodule bx. eval for PTX .    COMPARISON:  Chest radiograph 2/10/2023 10:51 a.m.    ACCESSION NUMBER(S):  [id number]    ORDERING CLINICIAN:  ISHAN CEJA    FINDINGS:  PA and lateral radiographs of the chest were provided.  Additional PA  dual energy images were also provided.        CARDIOMEDIASTINAL SILHOUETTE:  Cardiomediastinal silhouette is normal in size and configuration.    LUNGS:  No pneumothorax. No consolidation, pulmonary edema, or pleural  effusion.    ABDOMEN:  No remarkable upper abdominal findings.    BONES:  No acute osseous changes.    Impression  No sizable pneumothorax status post recent biopsy.    I personally reviewed the images/study and I agree with the findings  as stated by radiology resident Kristal Strauss MD. This study was  interpreted at [facility],  [place], [place].      Echocardiogram     Echocardiogram     Narrative  [facility] [place]  [id number] [address] [facility] [address]  Tel [phone] Fax [phone]    TRANSTHORACIC ECHOCARDIOGRAM REPORT      Patient Name:     YI DAMON Reading Physician:   [id number] Jaky Hoyos MD  Study Date:       7/13/2023      Referring Physician: JAKY HOYOS  MRN/PID:          [id number]       PCP:  Accession/Order#: [id number]   Department Location: [facility]  YOB: 1940       Fellow:  Gender:           M              Nurse:  Admit Date:                      Sonographer:         Mikki Luque [facility]  Admission Status: Outpatient     Additional Staff:  Height:           185.42 cm      CC Report to:  Weight:           108.41 kg      Study Type:          Echocardiogram  BSA:              2.32 m2  Blood  Pressure: 120 /68 mmHg    Diagnosis/ICD: R06.09-Other forms of dyspnea  Indication:    Dyspnea  Procedure/CPT: Echo Complete w Full Doppler-[id number]  Study Detail: The following Echo studies were performed: 2D, M-Mode, color flow  and Doppler. Definity used as a contrast agent for endocardial  border definition. Total contrast used for this procedure was 3 mL  via IV push.      PHYSICIAN INTERPRETATION:  Left Ventricle: Left ventricular systolic function is normal, with an estimated ejection fraction of 55-60%. There are no regional wall motion abnormalities. The left ventricular cavity size is normal. Spectral Doppler shows an impaired relaxation pattern of left ventricular diastolic filling.  Left Atrium: The left atrium is normal in size.  Right Ventricle: The right ventricle is normal in size. There is normal right ventricular global systolic function.  Right Atrium: The right atrium is normal in size.  Aortic Valve: The aortic valve appears structurally normal. There is mild aortic valve regurgitation. The peak instantaneous gradient of the aortic valve is 7.4 mmHg. The mean gradient of the aortic valve is 4.0 mmHg.  Mitral Valve: The mitral valve is normal in structure. There is no evidence of mitral valve regurgitation.  Tricuspid Valve: The tricuspid valve is structurally normal. There is trace tricuspid regurgitation. The estimated RVSP is 29 mm.  Pulmonic Valve: The pulmonic valve is structurally normal. There is trace pulmonic valve regurgitation.  Pericardium: There is no pericardial effusion noted.  Aorta: The aortic root is normal.      CONCLUSIONS:  1. Left ventricular systolic function is normal with a 55-60% estimated ejection fraction.  2. Spectral Doppler shows an impaired relaxation pattern of left ventricular diastolic filling.  3. The estimated RVSP is 29 mm.  4. Mild aortic valve regurgitation.    QUANTITATIVE DATA SUMMARY:  2D MEASUREMENTS:  Normal Ranges:  LAs:           4.30 cm    (2.7-4.0cm)  IVSd:          0.98 cm   (0.6-1.1cm)  LVPWd:         0.80 cm   (0.6-1.1cm)  LVIDd:         5.40 cm   (3.9-5.9cm)  LVIDs:         4.22 cm  LV Mass Index: 76.5 g/m2  LV % FS        21.9 %    LA VOLUME:  Normal Ranges:  LA Vol A4C:        41.2 ml    (22+/-6mL/m2)  LA Vol A2C:        43.6 ml  LA Vol BP:         42.5 ml  LA Vol Index A4C:  17.8ml/m2  LA Vol Index A2C:  18.8 ml/m2  LA Vol Index BP:   18.3 ml/m2  LA Area A4C:       16.2 cm2  LA Area A2C:       16.7 cm2  LA Major Axis A4C: 5.4 cm  LA Major Axis A2C: 5.4 cm  LA Volume Index:   16.8 ml/m2  LA Vol A4C:        37.0 ml  LA Vol A2C:        42.0 ml    AORTA MEASUREMENTS:  Normal Ranges:  Asc Ao, d: 3.75 cm (2.1-3.4cm)    LV SYSTOLIC FUNCTION BY 2D PLANIMETRY (MOD):  Normal Ranges:  EF-A4C View: 55.3 % (>=55%)  EF-A2C View: 55.6 %  EF-Biplane:  54.9 %    LV DIASTOLIC FUNCTION:  Normal Ranges:  MV Peak E:    0.49 m/s (0.7-1.2 m/s)  MV Peak A:    0.69 m/s (0.42-0.7 m/s)  E/A Ratio:    0.71     (1.0-2.2)  MV e'         0.07 m/s (>8.0)  MV lateral e' 0.09 m/s  MV medial e'  0.05 m/s  E/e' Ratio:   7.02     (<8.0)    MITRAL VALVE:  Normal Ranges:  MV DT: 289 msec (150-240msec)    AORTIC VALVE:  Normal Ranges:  AoV Vmax:                1.36 m/s (<=1.7m/s)  AoV Peak P.4 mmHg (<20mmHg)  AoV Mean P.0 mmHg (1.7-11.5mmHg)  LVOT Max Juaquin:            0.89 m/s (<=1.1m/s)  AoV VTI:                 25.90 cm (18-25cm)  LVOT VTI:                18.20 cm  LVOT Diameter:           2.00 cm  (1.8-2.4cm)  AoV Area, VTI:           2.21 cm2 (2.5-5.5cm2)  AoV Area,Vmax:           2.05 cm2 (2.5-4.5cm2)  AoV Dimensionless Index: 0.70    AORTIC INSUFFICIENCY:  AI Vmax:       4.15 m/s  AI Half-time:  652 msec  AI Decel Rate: 197.50 cm/s2      RIGHT VENTRICLE:  RV Basal 3.12 cm  RV Mid   2.13 cm  RV Major 7.3 cm  TAPSE:   23.3 mm  RV s'    0.11 m/s    TRICUSPID VALVE/RVSP:  Normal Ranges:  Peak TR Velocity: 1.98 m/s  RV Syst Pressure: 18.7 mmHg (<  30mmHg)      [id number] Bull Balderas MD  Electronically signed on 7/14/2023 at 6:55:35 PM       Chest CT Scan     CT chest wo IV contrast 01/10/2024    Narrative  Interpreted By:  Sandro Mayer,  STUDY:  CT CHEST WO IV CONTRAST;  1/10/2024 9:38 am    INDICATION:  Signs/Symptoms: multiple lung nodules - 6 months follow up  R91.8:  Lung nodules.    COMPARISON:  07/10/2023    ACCESSION NUMBER(S):  [id number]    ORDERING CLINICIAN:  TERESSA RODGERS    TECHNIQUE:  Helical data acquisition of the chest was obtained without the use of  IV contrast. Images were reformatted in axial, coronal, and sagittal  planes.    FINDINGS:  POTENTIAL LIMITATIONS OF THE STUDY:   Lack of IV contrast    HEART AND VESSELS:    There are atherosclerotic calcifications of the aorta and its  branches. The aorta is unchanged in course and caliber.    The heart is not significantly enlarged.    No pericardial effusion is seen.    MEDIASTINUM AND JOE, LOWER NECK AND AXILLA:  The visualized thyroid gland is within normal limits.    No evidence of thoracic lymphadenopathy by CT criteria.    Esophagus appears within normal limits as seen.    LUNGS AND AIRWAYS:  The trachea and central airways are patent. No endobronchial lesion.    There are numerous nodules scattered throughout the lungs which have  increased in size when compared to the previous examination. In  addition, a few of the nodules are now cavitary. A right upper lobe  nodule which measures up to 1.5 cm on today's study measured  approximately 1.3 cm on the previous study, image 72. Cavitary nodule  in the left upper lobe which measures 1.8 cm on today's exam measured  approximately 1.6 cm on the previous study, image 68. 9 mm nodule in  the right lower lobe measured 7 mm on the previous study, image 154.  The remaining nodules are either increased in size or are stable in  size. No definite new nodules are identified. No effusion. No  pneumothorax. Mild scattered areas of  atelectasis/scarring.    UPPER ABDOMEN:  The visualized subdiaphragmatic structures demonstrate no acute  abnormality.    CHEST WALL AND OSSEOUS STRUCTURES:  Degenerative changes. No acute process.    Impression  Interval increase in the size of the previously noted pulmonary  nodules. In addition, a few of the nodules are now cavitary. Findings  may relate to progression of metastatic disease or possibly  infectious/inflammatory process. Correlate with biopsy results.    MACRO:  None.    Signed by: Charito Mayer 1/11/2024 4:25 PM  Dictation workstation:   [id number]      CT chest wo IV contrast 07/10/2023    Narrative  Interpreted By:  CHARITO MAYER MD  MRN: [id number]  Patient Name: YI DAMON    STUDY:  CT CHEST WO CONTRAST;  7/10/2023 4:57 pm    INDICATION:  lung nodules  R91.8: Multiple pulmonary nodules determined by  computed tomography of lung.    COMPARISON:  03/22/2023    ACCESSION NUMBER(S):  [id number]    ORDERING CLINICIAN:  ANNIE ROSADO    TECHNIQUE:  Helical data acquisition of the chest was obtained without the use of  IV contrast. Images were reformatted in axial, coronal, and sagittal  planes.    FINDINGS:  POTENTIAL LIMITATIONS OF THE STUDY:   Lack of IV contrast    HEART AND VESSELS:    There are atherosclerotic calcifications of the aorta and its  branches. Aorta is unchanged in course and caliber.    The heart is unchanged in size.    No pericardial effusion is seen.    MEDIASTINUM AND JOE, LOWER NECK AND AXILLA:  The visualized thyroid gland is within normal limits.    No evidence of thoracic lymphadenopathy by CT criteria.    Esophagus is stable in course and caliber. Small hiatal hernia.    LUNGS AND AIRWAYS:  The trachea and central airways are patent. No endobronchial lesion.    The patient's known pulmonary nodules are again identified and are  not significantly changed in size, measuring up to 1.7 Cm. For  example, images 62, 58, 168, 191, 215 and 201 of 293. No new  suspicious pulmonary  nodule or mass. No consolidation. No evidence of  an effusion or pneumothorax. There are mild scattered areas of  atelectasis/scarring.    UPPER ABDOMEN:  The visualized subdiaphragmatic structures demonstrate no acute  abnormality. Stable subcentimeter hypoattenuating lesion in the right  hepatic lobe which is statistically most likely a cyst but is too  small to characterize. Left renal cyst is again partially  included/imaged.    CHEST WALL AND OSSEOUS STRUCTURES:  Degenerative changes. No acute process.    Impression  Stable scattered pulmonary nodules measuring 1.7 cm or less in size.  No new suspicious pulmonary nodule or mass. Continued surveillance  recommended as per Fleischner society guidelines.       Laboratory Studies     Lab Results   Component Value Date    WBC 11.1 01/21/2024    HGB 12.9 (L) 01/21/2024    HCT 40.5 (L) 01/21/2024    MCV 91 01/21/2024     01/21/2024      Lab Results   Component Value Date    GLUCOSE 93 01/21/2024    CALCIUM 9.3 01/21/2024     01/21/2024    K 4.7 01/21/2024    CO2 24 01/21/2024     01/21/2024    BUN 23 01/21/2024    CREATININE 1.35 (H) 01/21/2024      Lab Results   Component Value Date    ALT 14 01/21/2024    AST 20 01/21/2024    ALKPHOS 61 01/21/2024    BILITOT 0.4 01/21/2024        Protime   Date/Time Value Ref Range Status   01/21/2024 05:37 PM 11.7 9.8 - 12.8 seconds Final     INR   Date/Time Value Ref Range Status   01/21/2024 05:37 PM 1.0 0.9 - 1.1 Final           Assessment and Plan / Recommendations     1. Multiple pulmonary nodules:  Unclear etiology. PET/CT scan earlier last year revealed minimal metabolic activity indicating likely a benign etiology. S/P CT guided biopsy in Feb 2023 that was non-diagnostic. The nodules could be related to RA. Metastatic disease is felt to be less likely though given their interval increase in size, we discussed today the need to re-sample. The nodules are mostly peripheral and sub-pleural thus will consult  IR again for CT-guided biopsy. Will send biopsy to pathology and microbiology.     Please excuse any misspellings or unintended errors related to the Dragon speech recognition software used to dictate this note.         Obed Camarena MD   01/17/2024

## 2024-01-26 ENCOUNTER — OFFICE VISIT (OUTPATIENT)
Dept: HEMATOLOGY/ONCOLOGY | Facility: CLINIC | Age: 84
End: 2024-01-26
Payer: MEDICARE

## 2024-01-26 ENCOUNTER — APPOINTMENT (OUTPATIENT)
Dept: HEMATOLOGY/ONCOLOGY | Facility: CLINIC | Age: 84
End: 2024-01-26
Payer: MEDICARE

## 2024-01-26 VITALS
DIASTOLIC BLOOD PRESSURE: 75 MMHG | WEIGHT: 241.4 LBS | SYSTOLIC BLOOD PRESSURE: 134 MMHG | RESPIRATION RATE: 16 BRPM | OXYGEN SATURATION: 94 % | BODY MASS INDEX: 31.85 KG/M2 | HEART RATE: 80 BPM | TEMPERATURE: 95.4 F

## 2024-01-26 DIAGNOSIS — R91.8 MULTIPLE PULMONARY NODULES DETERMINED BY COMPUTED TOMOGRAPHY OF LUNG: Primary | ICD-10-CM

## 2024-01-26 PROCEDURE — 99213 OFFICE O/P EST LOW 20 MIN: CPT | Performed by: INTERNAL MEDICINE

## 2024-01-26 PROCEDURE — 1157F ADVNC CARE PLAN IN RCRD: CPT | Performed by: INTERNAL MEDICINE

## 2024-01-26 PROCEDURE — 3078F DIAST BP <80 MM HG: CPT | Performed by: INTERNAL MEDICINE

## 2024-01-26 PROCEDURE — 1159F MED LIST DOCD IN RCRD: CPT | Performed by: INTERNAL MEDICINE

## 2024-01-26 PROCEDURE — 3075F SYST BP GE 130 - 139MM HG: CPT | Performed by: INTERNAL MEDICINE

## 2024-01-26 PROCEDURE — 1126F AMNT PAIN NOTED NONE PRSNT: CPT | Performed by: INTERNAL MEDICINE

## 2024-01-26 PROCEDURE — 1036F TOBACCO NON-USER: CPT | Performed by: INTERNAL MEDICINE

## 2024-01-26 ASSESSMENT — ENCOUNTER SYMPTOMS
CONSTIPATION: 0
APPETITE CHANGE: 0
COUGH: 1
ADENOPATHY: 0
LEG SWELLING: 0
ABDOMINAL PAIN: 0
FATIGUE: 0
EYE PROBLEMS: 0
VOMITING: 0
DIZZINESS: 0
NAUSEA: 0
UNEXPECTED WEIGHT CHANGE: 0
DIARRHEA: 0
ARTHRALGIAS: 0
SHORTNESS OF BREATH: 1
HEADACHES: 0
BACK PAIN: 0
DIFFICULTY URINATING: 0

## 2024-01-26 ASSESSMENT — PAIN SCALES - GENERAL: PAINLEVEL: 0-NO PAIN

## 2024-01-26 NOTE — PROGRESS NOTES
[facility] - Medical Oncology Follow-Up Visit    Patient ID: Maurice Tripp is a 83 y.o. male being followed for multifocal pulmonary nodules     Current therapy:Surveillance     Chief Concern: Here today for follow-up and scan review    HPI Since last visit, CT revealed growth of several pulmonary nodules. He is following with pulmonology and was referred for IR guided biopsy. He overall feels well - states breathing is stable though feels he is coughing more, sometimes productive of mucous. 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 Using CPAP. Weight stable. Was in the ER over the weekend - felt like he couldn't get a deep breath without it catching. Workup largely negative, improved with muscle relaxer.    Diagnosis: N/A  Stage:  Cancer Staging   No matching staging information was found for the patient.   Current sites of disease: Bilateral lung nodules  Molecular and ancillary testing: N/A    Oncologic History:  1/2023 - CT chest with multiple/bilateral pulmonary nodules; PET w/ FDG avidity in multiple nodules and R hilar LN though all low-level uptake  2/15/23 - CT guided biopsy of YOSI nodule with fibroinflammatory changes, rare giant cells, chronic lymphoplasmacytic inflammation  3/22/23 - CT with stable findings  7/2023 - CT with stable findings  1/10/24 - CT with slight enlargement of several nodules    Meds (Current):  Current Outpatient Medications   Medication Instructions    aspirin 81 mg EC tablet 1 tablet, oral, Daily    citalopram (CELEXA) 20 mg, oral, Daily    cyanocobalamin (VITAMIN B-12) 500 mcg    folic acid (Folvite) 1 mg tablet 1 tablet, oral, Daily    hydroxychloroquine (PLAQUENIL) 400 mg, oral, Daily    leflunomide (ARAVA) 20 mg, oral, Daily    lidocaine (Lidoderm) 5 % patch 1 patch, transdermal, Daily, Remove & discard patch within 12 hours or as directed by MD.    losartan (Cozaar) 100 mg tablet losartan 100 mg tablet   TAKE 1/2 TABLET BY MOUTH  DAILY    lovastatin (MEVACOR) 20 mg, oral, Daily, as directed    omeprazole (PRILOSEC) 10 mg, oral, Daily    orphenadrine (NORFLEX) 100 mg, oral, 2 times daily PRN, Do not crush, chew, or split.     Review of Systems   Constitutional:  Negative for appetite change, fatigue and unexpected weight change.   HENT:   Negative for hearing loss.    Eyes:  Negative for eye problems.   Respiratory:  Positive for cough and shortness of breath.    Cardiovascular:  Negative for chest pain and leg swelling.   Gastrointestinal:  Negative for abdominal pain, constipation, diarrhea, nausea and vomiting.   Genitourinary:  Negative for difficulty urinating.    Musculoskeletal:  Negative for arthralgias and back pain.   Skin:  Negative for rash.   Neurological:  Negative for dizziness and headaches.   Hematological:  Negative for adenopathy.        Objective   BSA: 2.38 meters squared  /75 (BP Location: Left arm, Patient Position: Sitting)   Pulse 80   Temp 35.2 °C (95.4 °F) (Temporal)   Resp 16   Wt 110 kg (241 lb 6.5 oz)   SpO2 94%   BMI 31.85 kg/m²   Performance Status:  Asymptomatic     Physical Exam  Vitals and nursing note reviewed.   Constitutional:       General: He is not in acute distress.  HENT:      Head: Normocephalic and atraumatic.   Eyes:      Pupils: Pupils are equal, round, and reactive to light.   Cardiovascular:      Rate and Rhythm: Normal rate and regular rhythm.      Heart sounds: Normal heart sounds.   Pulmonary:      Effort: Pulmonary effort is normal.      Breath sounds: Normal breath sounds.   Abdominal:      General: There is no distension.   Musculoskeletal:         General: No swelling.   Skin:     General: Skin is warm and dry.      Findings: No rash.   Neurological:      General: No focal deficit present.      Mental Status: He is alert and oriented to person, place, and time.   Psychiatric:         Mood and Affect: Mood normal.         Behavior: Behavior normal.           Results:  Labs:  Lab Results   Component Value Date    WBC 11.1 01/21/2024    HGB 12.9 (L) 01/21/2024    HCT 40.5 (L) 01/21/2024    MCV 91 01/21/2024     01/21/2024      Lab Results   Component Value Date    NEUTROABS 7.24 (H) 01/21/2024      Lab Results   Component Value Date    GLUCOSE 93 01/21/2024    CALCIUM 9.3 01/21/2024     01/21/2024    K 4.7 01/21/2024    CO2 24 01/21/2024     01/21/2024    BUN 23 01/21/2024    CREATININE 1.35 (H) 01/21/2024     Lab Results   Component Value Date    ALT 14 01/21/2024    AST 20 01/21/2024    ALKPHOS 61 01/21/2024    BILITOT 0.4 01/21/2024      Lab Results   Component Value Date    TSH 1.02 07/20/2022       Imaging:  I have personally reviewed the below imaging and concur with the reported findings unless otherwise stated:    === Results for orders placed during the hospital encounter of 01/21/24 ===    CT angio chest abdomen pelvis [[id number]] 01/21/2024    Status: Normal  CHEST  1. Numerous bilateral lung nodules concerning for metastatic disease.  Further evaluation with PET-CT or tissue sampling is recommended.  2. No evidence for acute pulmonary embolic disease.  3.  No CT evidence for acute aortic aneurysm or dissection.  4. Additional detailed findings as above.    ABDOMEN - PELVIS  1.  No CT evidence for acute aortic aneurysm or dissection.  2. Mild plaque burden particularly involving distal abdominal aorta  and the origin of bilateral renal arteries.  3. Subcentimeter low-attenuation lesion involving dome of the liver  which may represent a cyst but incompletely characterized in this  exam. Attention in subsequent follow-up studies recommended.  4. Left renal cyst.  5. Prostatomegaly. Correlation with PSA levels recommended.  6. Additional detailed findings as above.      MACRO:  Critical Finding:  See findings. Notification was initiated on  1/21/2024 at 6:37 pm by  James Abrams.  (**-YCF-**) Instructions:    Signed by: James Abrams 1/21/2024  6:38 PM  Dictation workstation:   [id number]      Pathology:    No new pathology      Assessment/Plan      Maurice Tripp is a 83 y.o. male never smoker, with RA on hydroxychloroquine and leflunomide, who presents for follow-up regarding bilateral pulmonary nodules in the setting  of a negative biopsy.     Reviewed his overall clinical course and work-up to date previously. He has a subacute cough and fatigue, as well as pulmonary nodules noted on imaging. Discussed previously that findings appear stable. Does not rule out malignancy (could be indolent mucinous  adeno), but makes this less likely. He continues to have a chronic cough and dyspnea -- discussed inflammatory etiology or atypical infection in the setting of immunosuppression.     Recent scans however did show enlargement of lesions. Repeat biopsy is planned. He feels well. Discussed not clear at this time whether represents cancer.     Plan:    - Following with Dr. Venegas in pulmonology  - Follow-up with me after repeat biopsy to review results, if malignancy is found will want to ensure NGS results back prior to follow-up with me (discussed with him today)     Basia Laboy MD  [facility]

## 2024-01-31 LAB
ATRIAL RATE: 78 BPM
P AXIS: 37 DEGREES
P OFFSET: 191 MS
P ONSET: 129 MS
PR INTERVAL: 184 MS
Q ONSET: 221 MS
QRS COUNT: 13 BEATS
QRS DURATION: 90 MS
QT INTERVAL: 332 MS
QTC CALCULATION(BAZETT): 378 MS
QTC FREDERICIA: 362 MS
R AXIS: 23 DEGREES
T AXIS: 20 DEGREES
T OFFSET: 387 MS
VENTRICULAR RATE: 78 BPM

## 2024-02-06 ENCOUNTER — HOSPITAL ENCOUNTER (OUTPATIENT)
Dept: RADIOLOGY | Facility: HOSPITAL | Age: 84
Discharge: HOME | End: 2024-02-06
Payer: MEDICARE

## 2024-02-06 VITALS
HEART RATE: 72 BPM | OXYGEN SATURATION: 96 % | TEMPERATURE: 96.3 F | DIASTOLIC BLOOD PRESSURE: 62 MMHG | SYSTOLIC BLOOD PRESSURE: 115 MMHG | WEIGHT: 235 LBS | BODY MASS INDEX: 31.14 KG/M2 | HEIGHT: 73 IN | RESPIRATION RATE: 16 BRPM

## 2024-02-06 DIAGNOSIS — R91.8 LUNG NODULES: ICD-10-CM

## 2024-02-06 LAB
ERYTHROCYTE [DISTWIDTH] IN BLOOD BY AUTOMATED COUNT: 12.6 % (ref 11.5–14.5)
HCT VFR BLD AUTO: 38.4 % (ref 41–52)
HGB BLD-MCNC: 12.1 G/DL (ref 13.5–17.5)
INR PPP: 1.1 (ref 0.9–1.1)
MCH RBC QN AUTO: 29 PG (ref 26–34)
MCHC RBC AUTO-ENTMCNC: 31.5 G/DL (ref 32–36)
MCV RBC AUTO: 92 FL (ref 80–100)
NRBC BLD-RTO: 0 /100 WBCS (ref 0–0)
PLATELET # BLD AUTO: 221 X10*3/UL (ref 150–450)
PROTHROMBIN TIME: 11.9 SECONDS (ref 9.8–12.8)
RBC # BLD AUTO: 4.17 X10*6/UL (ref 4.5–5.9)
WBC # BLD AUTO: 7.6 X10*3/UL (ref 4.4–11.3)

## 2024-02-06 PROCEDURE — 85027 COMPLETE CBC AUTOMATED: CPT | Performed by: RADIOLOGY

## 2024-02-06 PROCEDURE — 87070 CULTURE OTHR SPECIMN AEROBIC: CPT | Mod: STJLAB | Performed by: INTERNAL MEDICINE

## 2024-02-06 PROCEDURE — 88305 TISSUE EXAM BY PATHOLOGIST: CPT | Performed by: PATHOLOGY

## 2024-02-06 PROCEDURE — 87102 FUNGUS ISOLATION CULTURE: CPT | Mod: STJLAB | Performed by: INTERNAL MEDICINE

## 2024-02-06 PROCEDURE — 71045 X-RAY EXAM CHEST 1 VIEW: CPT | Performed by: RADIOLOGY

## 2024-02-06 PROCEDURE — 7100000001 HC RECOVERY ROOM TIME - INITIAL BASE CHARGE

## 2024-02-06 PROCEDURE — 71045 X-RAY EXAM CHEST 1 VIEW: CPT

## 2024-02-06 PROCEDURE — 32408 CORE NDL BX LNG/MED PERQ: CPT | Performed by: RADIOLOGY

## 2024-02-06 PROCEDURE — 88312 SPECIAL STAINS GROUP 1: CPT | Performed by: PATHOLOGY

## 2024-02-06 PROCEDURE — 87015 SPECIMEN INFECT AGNT CONCNTJ: CPT | Mod: STJLAB | Performed by: INTERNAL MEDICINE

## 2024-02-06 PROCEDURE — 99153 MOD SED SAME PHYS/QHP EA: CPT

## 2024-02-06 PROCEDURE — 99153 MOD SED SAME PHYS/QHP EA: CPT | Performed by: RADIOLOGY

## 2024-02-06 PROCEDURE — 88305 TISSUE EXAM BY PATHOLOGIST: CPT | Mod: TC,SUR,STJLAB | Performed by: INTERNAL MEDICINE

## 2024-02-06 PROCEDURE — 85610 PROTHROMBIN TIME: CPT | Performed by: RADIOLOGY

## 2024-02-06 PROCEDURE — 88313 SPECIAL STAINS GROUP 2: CPT | Performed by: PATHOLOGY

## 2024-02-06 PROCEDURE — 99152 MOD SED SAME PHYS/QHP 5/>YRS: CPT | Performed by: RADIOLOGY

## 2024-02-06 PROCEDURE — 36415 COLL VENOUS BLD VENIPUNCTURE: CPT | Performed by: RADIOLOGY

## 2024-02-06 PROCEDURE — 2720000007 HC OR 272 NO HCPCS

## 2024-02-06 PROCEDURE — 2500000004 HC RX 250 GENERAL PHARMACY W/ HCPCS (ALT 636 FOR OP/ED): Performed by: RADIOLOGY

## 2024-02-06 PROCEDURE — 7100000002 HC RECOVERY ROOM TIME - EACH INCREMENTAL 1 MINUTE

## 2024-02-06 PROCEDURE — 99152 MOD SED SAME PHYS/QHP 5/>YRS: CPT

## 2024-02-06 PROCEDURE — 32408 CORE NDL BX LNG/MED PERQ: CPT

## 2024-02-06 RX ORDER — SODIUM CHLORIDE 9 MG/ML
50 INJECTION, SOLUTION INTRAVENOUS CONTINUOUS
Status: DISCONTINUED | OUTPATIENT
Start: 2024-02-06 | End: 2024-02-07 | Stop reason: HOSPADM

## 2024-02-06 RX ORDER — FENTANYL CITRATE 50 UG/ML
INJECTION, SOLUTION INTRAMUSCULAR; INTRAVENOUS
Status: COMPLETED | OUTPATIENT
Start: 2024-02-06 | End: 2024-02-06

## 2024-02-06 RX ORDER — MIDAZOLAM HYDROCHLORIDE 1 MG/ML
INJECTION, SOLUTION INTRAMUSCULAR; INTRAVENOUS
Status: COMPLETED | OUTPATIENT
Start: 2024-02-06 | End: 2024-02-06

## 2024-02-06 RX ADMIN — MIDAZOLAM 1 MG: 1 INJECTION INTRAMUSCULAR; INTRAVENOUS at 10:15

## 2024-02-06 RX ADMIN — FENTANYL CITRATE 50 MCG: 50 INJECTION, SOLUTION INTRAMUSCULAR; INTRAVENOUS at 10:15

## 2024-02-06 ASSESSMENT — PAIN SCALES - GENERAL

## 2024-02-06 ASSESSMENT — PAIN - FUNCTIONAL ASSESSMENT: PAIN_FUNCTIONAL_ASSESSMENT: 0-10

## 2024-02-06 NOTE — PRE-PROCEDURE NOTE
Interventional Radiology Preprocedure Note    Indication for procedure: The encounter diagnosis was Lung nodules. Multiple bilateral pulmonary nodules, for biopsy of most accessible nodule.    Relevant review of systems: NA    Relevant Labs:   Lab Results   Component Value Date    CREATININE 1.35 (H) 01/21/2024    EGFR 53 (L) 01/21/2024    INR 1.1 02/06/2024    PROTIME 11.9 02/06/2024       Planned Sedation/Anesthesia: Moderate    Airway assessment: normal    Directed physical examination:    Awake and alert, oriented  No acute distress  Regular rate, rhythm  Breathing non-labored    Mallampati: II (hard and soft palate, upper portion of tonsils anduvula visible)    ASA Score: ASA 3 - Patient with moderate systemic disease with functional limitations    Benefits, risks and alternatives of procedure and planned sedation have been discussed with the patient and/or their representative. All questions answered and they agree to proceed.

## 2024-02-06 NOTE — Clinical Note
RUL lung nodule biopsy complete.  Specimens collected and taken to lab. Site dressed with 4x4 gauze and tegaderm. Pt tolerated procedure well. Pt to return to [facility] for recovery and discharge.

## 2024-02-06 NOTE — POST-PROCEDURE NOTE
Interventional Radiology Brief Postprocedure Note    Attending: Dre Su MD    Assistant: Maykel Braswell CNP    Diagnosis: Right lung upper lobe mass    Description of procedure: Successful CT guided biopsy of right uppler lobe mass. 3 passes made.      Anesthesia:  MAC    Complications: None    Estimated Blood Loss: none    Medications (Filter: Administrations occurring from 0952 to 1044 on 02/06/24) As of 02/06/24 1044      fentaNYL PF (Sublimaze) injection (mcg) Total dose:  50 mcg      Date/Time Rate/Dose/Volume Action       02/06/24  1015 50 mcg Given               midazolam (Versed) injection (mg) Total dose:  1 mg      Date/Time Rate/Dose/Volume Action       02/06/24  1015 1 mg Given                     See detailed result report with images in PACS.    The patient tolerated the procedure well without incident or complication and is in stable condition.

## 2024-02-06 NOTE — NURSING NOTE
Dr Su in to speak with Pt/Family concerning result of chest xrays. Instructed Pt not to perform at concert this week. Written instructions reviewed with Pt/Family.

## 2024-02-08 LAB
BACTERIA SPEC CULT: NORMAL
GRAM STN SPEC: NORMAL
GRAM STN SPEC: NORMAL

## 2024-02-13 LAB
LABORATORY COMMENT REPORT: NORMAL
PATH REPORT.FINAL DX SPEC: NORMAL
PATH REPORT.GROSS SPEC: NORMAL
PATH REPORT.RELEVANT HX SPEC: NORMAL
PATH REPORT.TOTAL CANCER: NORMAL

## 2024-02-14 ENCOUNTER — TELEPHONE (OUTPATIENT)
Dept: PULMONOLOGY | Facility: CLINIC | Age: 84
End: 2024-02-14
Payer: MEDICARE

## 2024-02-14 NOTE — TELEPHONE ENCOUNTER
Pt called stated he had a biopsy test done on 2/6/2024 and hasn't got a call yet about his results.    Pt is asking for a phone call to discuss his results. He stated he is worried and if you can call him soon.    Please advise.

## 2024-02-23 ENCOUNTER — OFFICE VISIT (OUTPATIENT)
Dept: HEMATOLOGY/ONCOLOGY | Facility: CLINIC | Age: 84
End: 2024-02-23
Payer: MEDICARE

## 2024-02-23 VITALS
DIASTOLIC BLOOD PRESSURE: 75 MMHG | SYSTOLIC BLOOD PRESSURE: 116 MMHG | HEART RATE: 75 BPM | TEMPERATURE: 95.4 F | WEIGHT: 240.08 LBS | BODY MASS INDEX: 31.67 KG/M2 | RESPIRATION RATE: 16 BRPM | OXYGEN SATURATION: 96 %

## 2024-02-23 DIAGNOSIS — R91.8 MULTIPLE PULMONARY NODULES DETERMINED BY COMPUTED TOMOGRAPHY OF LUNG: ICD-10-CM

## 2024-02-23 PROCEDURE — 1159F MED LIST DOCD IN RCRD: CPT | Performed by: INTERNAL MEDICINE

## 2024-02-23 PROCEDURE — 1036F TOBACCO NON-USER: CPT | Performed by: INTERNAL MEDICINE

## 2024-02-23 PROCEDURE — 3074F SYST BP LT 130 MM HG: CPT | Performed by: INTERNAL MEDICINE

## 2024-02-23 PROCEDURE — 99213 OFFICE O/P EST LOW 20 MIN: CPT | Performed by: INTERNAL MEDICINE

## 2024-02-23 PROCEDURE — 3078F DIAST BP <80 MM HG: CPT | Performed by: INTERNAL MEDICINE

## 2024-02-23 PROCEDURE — 1126F AMNT PAIN NOTED NONE PRSNT: CPT | Performed by: INTERNAL MEDICINE

## 2024-02-23 PROCEDURE — 1157F ADVNC CARE PLAN IN RCRD: CPT | Performed by: INTERNAL MEDICINE

## 2024-02-23 ASSESSMENT — PAIN SCALES - GENERAL: PAINLEVEL: 0-NO PAIN

## 2024-02-26 LAB
FUNGUS SPEC CULT: NORMAL
FUNGUS SPEC FUNGUS STN: NORMAL

## 2024-02-26 ASSESSMENT — ENCOUNTER SYMPTOMS
BACK PAIN: 0
ABDOMINAL PAIN: 0
ADENOPATHY: 0
SHORTNESS OF BREATH: 1
LEG SWELLING: 0
DIARRHEA: 0
CONSTIPATION: 0
DIZZINESS: 0
ARTHRALGIAS: 0
DIFFICULTY URINATING: 0
EYE PROBLEMS: 0
UNEXPECTED WEIGHT CHANGE: 0
HEADACHES: 0
NAUSEA: 0
APPETITE CHANGE: 0
COUGH: 1
VOMITING: 0
FATIGUE: 0

## 2024-02-26 NOTE — PROGRESS NOTES
[facility] - Medical Oncology Follow-Up Visit    Patient ID: Maurice Tripp is a 83 y.o. male being followed for multifocal pulmonary nodules     Current therapy: N/A     Chief Concern: Here today for follow-up and review of biopsy results.    HPI Since last visit, underwent repeat biopsy of lung nodule. No evidence of malignancy, did show possible organizing PNA/granuloma. He feels about the same with nagging but stable cough.    Diagnosis: N/A  Stage:  Cancer Staging   No matching staging information was found for the patient.     Current sites of disease: Bilateral lung nodules  Molecular and ancillary testing: N/A    Oncologic History:  1/2023 - CT chest with multiple/bilateral pulmonary nodules; PET w/ FDG avidity in multiple nodules and R hilar LN though all low-level uptake  2/15/23 - CT guided biopsy of YOSI nodule with fibroinflammatory changes, rare giant cells, chronic lymphoplasmacytic inflammation  3/22/23 - CT with stable findings  7/2023 - CT with stable findings  1/10/24 - CT with slight enlargement of several nodules  02/2024 - Biopsy of YOSI nodule with organizing PNA, chronic inflammation, focal necrosis (possible granuloma)    Meds (Current):  Current Outpatient Medications   Medication Instructions    citalopram (CELEXA) 20 mg, oral, Daily    cyanocobalamin (VITAMIN B-12) 500 mcg    folic acid (Folvite) 1 mg tablet 1 tablet, oral, Daily    hydroxychloroquine (PLAQUENIL) 400 mg, oral, Daily    leflunomide (ARAVA) 20 mg, oral, Daily    lidocaine (Lidoderm) 5 % patch 1 patch, transdermal, Daily, Remove & discard patch within 12 hours or as directed by MD.    losartan (Cozaar) 100 mg tablet losartan 100 mg tablet   TAKE 1/2 TABLET BY MOUTH DAILY    lovastatin (MEVACOR) 20 mg, oral, Daily, as directed    omeprazole (PRILOSEC) 10 mg, oral, Daily    orphenadrine (NORFLEX) 100 mg, oral, 2 times daily PRN, Do not crush, chew, or split.     Review of Systems   Constitutional:   Negative for appetite change, fatigue and unexpected weight change.   HENT:   Negative for hearing loss.    Eyes:  Negative for eye problems.   Respiratory:  Positive for cough and shortness of breath.    Cardiovascular:  Negative for chest pain and leg swelling.   Gastrointestinal:  Negative for abdominal pain, constipation, diarrhea, nausea and vomiting.   Genitourinary:  Negative for difficulty urinating.    Musculoskeletal:  Negative for arthralgias and back pain.   Skin:  Negative for rash.   Neurological:  Negative for dizziness and headaches.   Hematological:  Negative for adenopathy.        Objective   BSA: 2.37 meters squared  /75 (BP Location: Left arm, Patient Position: Sitting)   Pulse 75   Temp 35.2 °C (95.4 °F) (Temporal)   Resp 16   Wt 109 kg (240 lb 1.3 oz)   SpO2 96%   BMI 31.67 kg/m²   Performance Status:  Asymptomatic     Physical Exam  Vitals and nursing note reviewed.   Constitutional:       General: He is not in acute distress.  HENT:      Head: Normocephalic and atraumatic.   Eyes:      Pupils: Pupils are equal, round, and reactive to light.   Cardiovascular:      Rate and Rhythm: Normal rate and regular rhythm.      Heart sounds: Normal heart sounds.   Pulmonary:      Effort: Pulmonary effort is normal.      Breath sounds: Normal breath sounds.   Abdominal:      General: There is no distension.   Musculoskeletal:         General: No swelling.   Skin:     General: Skin is warm and dry.      Findings: No rash.   Neurological:      General: No focal deficit present.      Mental Status: He is alert and oriented to person, place, and time.   Psychiatric:         Mood and Affect: Mood normal.         Behavior: Behavior normal.          Results:  Labs:  Lab Results   Component Value Date    WBC 7.6 02/06/2024    HGB 12.1 (L) 02/06/2024    HCT 38.4 (L) 02/06/2024    MCV 92 02/06/2024     02/06/2024      Lab Results   Component Value Date    NEUTROABS 7.24 (H) 01/21/2024      Lab  Results   Component Value Date    GLUCOSE 93 01/21/2024    CALCIUM 9.3 01/21/2024     01/21/2024    K 4.7 01/21/2024    CO2 24 01/21/2024     01/21/2024    BUN 23 01/21/2024    CREATININE 1.35 (H) 01/21/2024     Lab Results   Component Value Date    ALT 14 01/21/2024    AST 20 01/21/2024    ALKPHOS 61 01/21/2024    BILITOT 0.4 01/21/2024      Lab Results   Component Value Date    TSH 1.02 07/20/2022       Imaging:  I have personally reviewed the below imaging and concur with the reported findings unless otherwise stated:    No new imaging    Pathology:    02/06/24  FINAL DIAGNOSIS    A. Lung, right upper lobe nodule, core biopsy:  -- Lung parenchyma with organizing pneumonia, chronic inflammation and focal area of necrosis; see note.        Note:   Microscopic examination reveals lung parenchyma with organizing pneumonia pattern prominent, predominantly chronic , inflammation and an area of necrosis which could represent portion of necrotizing granuloma.  GMS and AFB histochemical stains are negative for microorganisms.  These findings are nonspecific and may not be representative of the clinically identified lesions.  Clinical correlation is recommended            Assessment/Plan      Maurice Tripp is a 83 y.o. male never smoker, with RA on hydroxychloroquine and leflunomide, who presents for follow-up regarding bilateral pulmonary nodules in the setting  of a negative biopsy.     Reviewed his overall clinical course and work-up to date previously. He has a subacute cough and fatigue, that are stable. Recently due to slight interval growth in nodules he underwent a repeat biopsy which showed organizing PNA and possible necrotizing granuloma but no evidence of malignancy. Discussed that overall picture not consistent with malignancy, he will follow up with pulmonology and rheumatology to discuss next steps.       Plan:    - Following with Dr. Venegas in pulmonology  - He will also follow-up with his  rheumatologist    Can follow-up with me PRN in the future if any concerning changes     Basia Laboy MD  [facility]

## 2024-02-28 DIAGNOSIS — I10 PRIMARY HYPERTENSION: Primary | ICD-10-CM

## 2024-02-28 RX ORDER — LOSARTAN POTASSIUM 100 MG/1
TABLET ORAL DAILY
Qty: 45 TABLET | Refills: 3 | Status: SHIPPED | OUTPATIENT
Start: 2024-02-28

## 2024-02-29 PROBLEM — R91.8 LUNG FIELD ABNORMAL: Status: ACTIVE | Noted: 2023-05-12

## 2024-02-29 PROBLEM — R26.2 DIFFICULTY IN WALKING, NOT ELSEWHERE CLASSIFIED: Status: ACTIVE | Noted: 2020-02-07

## 2024-02-29 PROBLEM — M20.41 ACQUIRED HAMMERTOE OF RIGHT FOOT: Status: ACTIVE | Noted: 2024-02-29

## 2024-02-29 PROBLEM — G47.30 SLEEP APNEA, UNSPECIFIED: Status: ACTIVE | Noted: 2024-02-29

## 2024-02-29 PROBLEM — M62.81 MUSCLE WEAKNESS (GENERALIZED): Status: ACTIVE | Noted: 2020-02-07

## 2024-02-29 PROBLEM — D64.9 ANEMIA: Status: ACTIVE | Noted: 2021-12-09

## 2024-02-29 PROBLEM — M20.30 ACQUIRED HALLUX MALLEUS: Status: ACTIVE | Noted: 2024-02-29

## 2024-02-29 PROBLEM — F10.21 ALCOHOL DEPENDENCE, IN REMISSION (MULTI): Status: ACTIVE | Noted: 2020-02-07

## 2024-02-29 PROBLEM — E83.10 DISORDER OF IRON METABOLISM: Status: ACTIVE | Noted: 2024-02-29

## 2024-02-29 PROBLEM — R26.9 ABNORMAL GAIT: Status: ACTIVE | Noted: 2024-02-29

## 2024-02-29 PROBLEM — R53.83 FATIGUE: Status: ACTIVE | Noted: 2024-02-29

## 2024-02-29 PROBLEM — E78.5 DYSLIPIDEMIA: Status: ACTIVE | Noted: 2024-02-29

## 2024-02-29 PROBLEM — Z79.60 LONG TERM (CURRENT) USE OF UNSPECIFIED IMMUNOMODULATORS AND IMMUNOSUPPRESSANTS: Status: ACTIVE | Noted: 2024-02-29

## 2024-02-29 PROBLEM — D69.6 THROMBOCYTOPENIA, UNSPECIFIED (CMS-HCC): Status: ACTIVE | Noted: 2020-02-07

## 2024-02-29 PROBLEM — M76.60 ACHILLES TENDINITIS: Status: ACTIVE | Noted: 2024-02-29

## 2024-02-29 RX ORDER — CHOLECALCIFEROL (VITAMIN D3) 25 MCG
25 TABLET ORAL
COMMUNITY
End: 2024-04-23 | Stop reason: WASHOUT

## 2024-02-29 RX ORDER — METHOTREXATE 2.5 MG/1
7.5 TABLET ORAL
COMMUNITY
Start: 2013-03-05 | End: 2024-04-23 | Stop reason: WASHOUT

## 2024-02-29 RX ORDER — FLUTICASONE PROPIONATE 50 MCG
SPRAY, SUSPENSION (ML) NASAL DAILY
COMMUNITY
Start: 2023-01-09

## 2024-02-29 RX ORDER — TIZANIDINE 4 MG/1
TABLET ORAL
COMMUNITY

## 2024-02-29 RX ORDER — CETIRIZINE HYDROCHLORIDE 10 MG/1
1 TABLET ORAL DAILY PRN
COMMUNITY

## 2024-03-05 ENCOUNTER — OFFICE VISIT (OUTPATIENT)
Dept: SLEEP MEDICINE | Facility: CLINIC | Age: 84
End: 2024-03-05
Payer: MEDICARE

## 2024-03-05 VITALS
SYSTOLIC BLOOD PRESSURE: 132 MMHG | HEART RATE: 84 BPM | BODY MASS INDEX: 32.07 KG/M2 | RESPIRATION RATE: 20 BRPM | HEIGHT: 73 IN | DIASTOLIC BLOOD PRESSURE: 73 MMHG | OXYGEN SATURATION: 98 % | TEMPERATURE: 97.5 F | WEIGHT: 242 LBS

## 2024-03-05 DIAGNOSIS — E83.10 DISORDER OF IRON METABOLISM: Primary | ICD-10-CM

## 2024-03-05 DIAGNOSIS — G47.33 OBSTRUCTIVE SLEEP APNEA: ICD-10-CM

## 2024-03-05 PROCEDURE — 1157F ADVNC CARE PLAN IN RCRD: CPT | Performed by: PHYSICIAN ASSISTANT

## 2024-03-05 PROCEDURE — 3078F DIAST BP <80 MM HG: CPT | Performed by: PHYSICIAN ASSISTANT

## 2024-03-05 PROCEDURE — 99214 OFFICE O/P EST MOD 30 MIN: CPT | Performed by: PHYSICIAN ASSISTANT

## 2024-03-05 PROCEDURE — 3075F SYST BP GE 130 - 139MM HG: CPT | Performed by: PHYSICIAN ASSISTANT

## 2024-03-05 PROCEDURE — 1126F AMNT PAIN NOTED NONE PRSNT: CPT | Performed by: PHYSICIAN ASSISTANT

## 2024-03-05 PROCEDURE — 1159F MED LIST DOCD IN RCRD: CPT | Performed by: PHYSICIAN ASSISTANT

## 2024-03-05 PROCEDURE — 1036F TOBACCO NON-USER: CPT | Performed by: PHYSICIAN ASSISTANT

## 2024-03-05 SDOH — ECONOMIC STABILITY: FOOD INSECURITY: WITHIN THE PAST 12 MONTHS, THE FOOD YOU BOUGHT JUST DIDN'T LAST AND YOU DIDN'T HAVE MONEY TO GET MORE.: NEVER TRUE

## 2024-03-05 SDOH — ECONOMIC STABILITY: FOOD INSECURITY: WITHIN THE PAST 12 MONTHS, YOU WORRIED THAT YOUR FOOD WOULD RUN OUT BEFORE YOU GOT MONEY TO BUY MORE.: NEVER TRUE

## 2024-03-05 ASSESSMENT — ENCOUNTER SYMPTOMS
DEPRESSION: 0
LOSS OF SENSATION IN FEET: 0
OCCASIONAL FEELINGS OF UNSTEADINESS: 0

## 2024-03-05 ASSESSMENT — LIFESTYLE VARIABLES
HOW OFTEN DO YOU HAVE A DRINK CONTAINING ALCOHOL: NEVER
HOW OFTEN DO YOU HAVE SIX OR MORE DRINKS ON ONE OCCASION: NEVER
AUDIT-C TOTAL SCORE: 0
HOW MANY STANDARD DRINKS CONTAINING ALCOHOL DO YOU HAVE ON A TYPICAL DAY: PATIENT DOES NOT DRINK
SKIP TO QUESTIONS 9-10: 1

## 2024-03-05 ASSESSMENT — COLUMBIA-SUICIDE SEVERITY RATING SCALE - C-SSRS
2. HAVE YOU ACTUALLY HAD ANY THOUGHTS OF KILLING YOURSELF?: NO
6. HAVE YOU EVER DONE ANYTHING, STARTED TO DO ANYTHING, OR PREPARED TO DO ANYTHING TO END YOUR LIFE?: NO
1. IN THE PAST MONTH, HAVE YOU WISHED YOU WERE DEAD OR WISHED YOU COULD GO TO SLEEP AND NOT WAKE UP?: NO

## 2024-03-05 ASSESSMENT — PAIN SCALES - GENERAL: PAINLEVEL: 0-NO PAIN

## 2024-03-05 NOTE — ASSESSMENT & PLAN NOTE
-possible some mild RLS symptoms  -PLMs noted more during titration portion of study  -has pain/aches in his legs more so vs persistent urge to move legs but does wake at times to legs moving  -check iron studies/monitor

## 2024-03-05 NOTE — PROGRESS NOTES
Patient: Maurice Tripp    [id number]  : 1940 -- AGE 83 y.o.    Provider: Hilaria Concepcion PA-C     Location [facility]   Service Date: 3/5/2024              [facility] Sleep Medicine Clinic  Followup Visit Note    HISTORY OF PRESENT ILLNESS     HISTORY OF PRESENT ILLNESS   Maurice Tripp is a 83 y.o. male with h/o  who presents to a [facility] Sleep Medicine Clinic for followup.     Assessment and plan from last visit: 2023  SLEEP APNEA - SUSPECTED  -Sleep study ordered: Split Night study at [place]   -avoid drowsy driving      Possible RLS vs Neuropathy/also has RA   -pain with legs when they are elevated, does get urge to move them  -iron/ferritin studies pending at this time         RTC 2-3 weeks after testing.  **Patient is aware of my upcoming maternity leave and recommendation to follow up with sleep medicine 2-3 weeks after study to go over results.   Provided scheduling information for Merline Quiroz and other sleep providers.      Current History  Sleep nurse reviewed results of sleep study with patient; he was started on PAP therapy after our last visit.     On today's visit, the patient reports doing well with pap therapy, did take him some time to acclimate. Does use FFM. Does wake with dry mouth. Doing well with pap thearpy overall at this time otherwise, no issues.  Following with pulmonology/cardiology for some dyspnea on exertion  Some PLMs noted more during titration portion of sleep study - states at times he does wake and move his legs at night but also has arthritis/other aches, not necessarily a persistent urge to move legs.             ESS:  3  SAJAN:  3  FOSQ: 37    REVIEW OF SYSTEMS     REVIEW OF SYSTEMS  See HPI; all other ROS were reviewed and negative for compliant      ALLERGIES AND MEDICATIONS     ALLERGIES  No Known Allergies    MEDICATIONS: He has a current medication list which includes the following prescription(s): cetirizine - Take 1  tablet (10 mg) by mouth once daily as needed, cholecalciferol - Take 1 tablet (25 mcg) by mouth, citalopram - TAKE 1 TABLET DAILY, cyanocobalamin - 1 tablet (500 mcg), fluticasone - Administer into each nostril once daily, folic acid - Take 1 tablet (1 mg) by mouth once daily, hydroxychloroquine - Take 2 tablets (400 mg) by mouth once daily, leflunomide - Take 1 tablet (20 mg) by mouth once daily, lidocaine - Place 1 patch over 12 hours on the skin once daily. Remove & discard patch within 12 hours or as directed by MD (Patient not taking: Reported on 2/6/2024), losartan - TAKE ONE-HALF (1/2) TABLET DAILY, lovastatin - TAKE 1 TABLET DAILY AS DIRECTED, methotrexate - Take 3 tablets (7.5 mg total) by mouth, omeprazole - TAKE 1 CAPSULE DAILY, orphenadrine - Take 1 tablet (100 mg) by mouth 2 times a day as needed for muscle spasms for up to 10 days. Do not crush, chew, or split, saw palmetto - Take by mouth, tizanidine - tizanidine 4 mg tablet   TAKE 1 TABLET BY MOUTH THREE TIMES A DAY, and vits a,c,e/lutein/minerals - Take by mouth.    PAST MEDICAL HISTORY : He  has a past medical history of Acute bronchitis (05/12/2023), Acute non-recurrent maxillary sinusitis (05/12/2023), Alcohol dependence (CMS/HCC) (05/12/2023), BRBPR (bright red blood per rectum) (05/12/2023), Fungal nail infection (05/12/2023), Right cervical radiculopathy (05/12/2023), and Subacute cough (05/12/2023).    PAST SURGICAL HISTORY: He  has a past surgical history that includes CT guided percutaneous biopsy lung (2/10/2023).     FAMILY HISTORY: No changes since previous visit. Otherwise non-contributory as charted.     SOCIAL HISTORY  He  reports that he has never smoked. He has never used smokeless tobacco. No history on file for alcohol use and drug use.       PHYSICAL EXAM     VITAL SIGNS: There were no vitals taken for this visit.       PREVIOUS WEIGHTS:  Wt Readings from Last 3 Encounters:   02/23/24 109 kg (240 lb 1.3 oz)   02/06/24 107 kg (235  lb)   01/26/24 110 kg (241 lb 6.5 oz)       Constitutional: Alert and oriented, cooperative, no acute distress  Head: Normocephalic, atraumatic   Cranial Features: No abnormal craniofacial features  Neck: Supple. Trachea midline.  Pulmonary: Non-labored breathing, speaks in full sentences. No cough.    Cardiac: regular rate   Extremities: No clubbing, no edema  Neuromuscular: Cranial nerves grossly intact, no focal deficits      RESULTS/DATA     Bicarbonate (mmol/L)   Date Value   01/21/2024 24   11/20/2023 24   07/17/2023 27   06/01/2023 25   03/21/2023 26       PAP Adherence    ASSESSMENT/PLAN     Mr. Tripp is a 83 y.o. male and he returns in followup to the [facility] for SHA.    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

## 2024-03-05 NOTE — PATIENT INSTRUCTIONS
[facility] Sleep Medicine  PAR [address] MEDICAL [facility] [address]  [address] [facility] [address]       NAME: Maurice Tripp   DATE: 03/05/24    Your Sleep Provider Today: Hilaria Concepcion PA-C  Your Primary Care Physician: Toney Almeida, DO   Your Referring Provider: No ref. provider found    DIAGNOSIS:   No diagnosis found.    Thank you for coming to the Sleep Medicine Clinic today! Your sleep medicine provider today was: Hilaria Concepcion PA-C Below is a summary of your treatment plan, other important information, and our contact numbers:      TREATMENT PLAN     You are doing very well on cpap therapy- keep up the good work.   At your convenience- you can get your iron studies drawn - any  lab    Instructions - Common SHA Recs: - For your sleep apnea, continue to use your PAP every night and use it whenever you are sleeping.   - Avoid alcohol or sedatives several hours prior to sleeping.   - Get additional supplies for your PAP (e.g., mask, hose, filters) every 3 months or as your insurance allows from your [employer] company. Replacement cushions for your PAP mask can be requested monthly if airseals are an issue.  - Remember to clean your mask, tubings, and water chamber regularly as instructed.  - Avoid driving or operating heavy machinery when drowsy. A person driving while sleepy is five (5) times more likely to have an accident. If you feel sleepy, pull over and take a short power nap (sleep for less than 30 minutes). Otherwise, ask somebody to drive you.        Follow-up Appointment:   6 months      IMPORTANT INFORMATION     Call 911 for medical emergencies.  Our offices are generally open from Monday-Friday, 9 am - 5 pm.  If you need to get in touch with me, you may either call me and my team(number is below) or you can use [employer].  If a referral for a test, for CPAP, or for another specialist was made, and you have not heard about scheduling this within a week, please call  scheduling at [phone] ([phone]).  If you are unable to make your appointment for clinic or an overnight study, kindly call the office at least 48 hours in advance to cancel and reschedule.  If you are on CPAP, please bring your device's card or the device to each clinic appointment.   There are no supporting services by either the sleep doctors or their staff on weekends and Holidays, or after 5 PM on weekdays.   If you have been asked to come to a sleep study, make sure you bring toiletries, a comfy pillow, and any nighttime medications that you may regularly take. Also be sure to eat dinner before you arrive. We generally do not provide meals.      PRESCRIPTIONS     We require 7 days advanced notice for prescription refills. If we do not receive the request in this time, we cannot guarantee that your medication will be refilled in time.      IMPORTANT PHONE NUMBERS     Sleep Medicine Clinic Fax: [phone]  Appointments (for Adult Sleep Clinic): [phone] - option 2  Appointments (For Sleep Studies): [phone] - option 3  Behavioral Sleep Medicine: [phone]  Sleep Surgery: [phone]  ENT (Otolaryngology): [phone]  Headache Clinic (Neurology): [phone]  Neurology: [phone]  Psychiatry: [phone]  Pulmonary Function Testing (PFT) Center: [phone]  Pulmonary Medicine: [phone]  [employer] (DME): [phone]  [employer] (DME): [phone]  [facility] (DME): [phone]      OUR ADULT SLEEP MEDICINE TEAM   Please do not hesitate to call the office or sleep nurse with any questions between appointments:    Adult Sleep Nurses (Emmie Yusuf, RN and Alma Jarrett RN):  For clinical questions and refilling prescriptions: [phone]  Email sleep diaries and other documents at: [email or web address]    Adult Sleep Medicine Secretaries:  Rosita Sanford (For Gala/Her/Carlene/Irma/Dawson/Richie):   P: [phone]  F:  [phone]  Analia Garcia (For Goncalves/Guggenisraeller): P: [phone]  Fax: [phone]  Fallon Haslam (For Jurev/Blank): P: [phone]  F: [phone]  Nicole Prado (For Katherin): P: [phone]  F: [phone]  Ashley Paredes (For Susan/Porsha/Zakhary): P: [phone]  F: [phone]  Rosaline Gayle (For Terrence/Sadi): P: [phone]  F: [phone]     Adult Sleep Medicine Advanced Practice Providers:  Lico Amaya ([place], [place])  Soni Story ([facility])  Nati Velasquez CNP ([place], [place], [place])  Shikha Concepcion CNP ([place], [place], [place])  Gabrielle Pandey ([place], [place], [place])  Mary Avitia CNP ([facility])        OUR SLEEP TESTING LOCATIONS     Our team will contact you to schedule your sleep study, however, you can contact us as follow:  Main Phone Line (scheduling only): [phone] ([phone]), option 3  Adult and Pediatric Locations  [facility] (6 years and older): [facility] - 4th floor ([address] [place] [address]) After hours line: [phone]  [facility] (Main campus: All ages): [facility], 6th floor. After hours line: [phone]   [place] (5 years and older; younger considered on case-by-case basis): [address]; [address], [address]. Scheduling  After hours line: [phone]   [place] (6 years and older): [address]; [address]; [address]   [place] (6 years and older): [address] [facility] [address] [facility] [address]  After hours line: [phone]   [place] (13 years and older) in [place]: [address], 2nd floor  After hours line: [phone]   [place] (13 year and older): [address]  After hours line: [phone]     Adult Only Locations:   [place] (18 years and older): [address] [facility], 2nd floor   [place] (18 years and older): [address] [facility]; 4th floor  After hours line: [phone]  [facility]  "(18 years and older) at [place]: [address] [place] [address]  After hours line: [phone]          CONTACTING YOUR SLEEP MEDICINE PROVIDER     Send a message directly to your provider through \"My Chart\", which is the email service through your  Records Account: [email or web address]   Call [phone] and leave a message. One of the administrative assistants will forward the message to your sleep medicine provider through \"My Chart\" and/or email.     Your sleep medicine provider for this visit was: Hilaria Concepcion PA-C    "

## 2024-03-05 NOTE — ASSESSMENT & PLAN NOTE
-continue cpap 11, rAHI well controlled and tolerating well  -can use biotene gel for dry mouth; also showed how to adjust humidity feature on cpap clinic today  -update supply order

## 2024-03-07 NOTE — PROGRESS NOTES
Subjective   Patient ID: [id number]   Maurice Tripp is a 83 y.o. male with seropositive RA, obesity with a BMI of 32, DL, HTN, and CKD stage III, presents for follow up for RA    Current IS:  - HCQ 200mg BID (eye exam UTD 10/2023- [facility], no toxicity from plaquanil)   - Leflunomide 20mg QD ( for at least 2-3 years)     Prior IS:  - MTX was on it for a long time, switched over to Leflunomide as he was on MTX for too long     HPI  Saw pulm Dr. Venegas, had growth in his pulmonary nodules with fatigue and cough, ordered a repeat biopsy  02/2024 - Biopsy of YOSI nodule with organizing PNA, chronic inflammation, focal necrosis (possible granuloma)   Repeat CT in 6 months   Saw hem/onc -> no malignancy  He was an , retired last year  Recently diagnosed with SHA, wearing the CPAP now  Sees dermatology once a year, no significant rashes  Saw ophtho in 10/23, no retinal toxicity  The patient is doing well  Just a few minutes of  morning stiffness, no painful joints or swelling   Compliant with meds  No side effects reported  No episodes of eye inflammation  No sicca sx  No chest pain, cough or dyspnea  No rashes  No infections    ROS:  As per HPI     Rheum hx (Recall from Dr. Craven's notes):  Saw Dr. Roldan 2014 (previously Dr. Servando Barrientos- RA diagnosis, + RF tried gold shots ineffective, MTX + HCQ since ~2004), then saw Dr. Barry.      In the morning, on waking up, gets pain in feet and stiffness in feet/ankles. AM stiffness in feet can last few hours. Denies significant pain/swelling/stiffness in hand, wrists, elbow or shoulder currently. Sees Podiatry (hx of hammertoe involving all digits). No skin rashes. + dry mouth at night, no dry eyes, no hx of ocular inflammation. No hx of rheumatoid nodules. Has had paresthesia in feet, EMG/NCS done twice and states the tests were ok. No night sweats, unintentional weight loss, or lymph node swelling . No bowel issues, had a colonoscopy 2 months ago, no concerns      No significant fmhx   Wife recently had a hip surgery. 2 sons. 3 grandkids    Patient Active Problem List   Diagnosis    Abnormal CXR (chest x-ray)    Anxiety    BPH (benign prostatic hyperplasia)    Elevated PSA    GERD (gastroesophageal reflux disease)    Grade I hemorrhoids    Hyperlipidemia    Hypertension    Male erectile disorder    Multiple pulmonary nodules determined by computed tomography of lung    Rheumatoid arthritis (CMS/HCC)    Stage 3a chronic kidney disease (CMS/HCC)    Vitamin D deficiency    Shortness of breath    Other nonthrombocytopenic purpura (CMS/HCC)    Anemia    Obstructive sleep apnea    Muscle weakness (generalized)    Lung field abnormal    Long term (current) use of unspecified immunomodulators and immunosuppressants    Fatigue    Dyslipidemia    Difficulty in walking, not elsewhere classified    Disorder of iron metabolism    Alcohol dependence, in remission (CMS/HCC)    Acquired hammertoe of right foot    Acquired hallux malleus    Abnormal gait    Achilles tendinitis    Thrombocytopenia, unspecified (CMS/HCC)      Past Medical History:   Diagnosis Date    Acute bronchitis 2023    Acute non-recurrent maxillary sinusitis 2023    Alcohol dependence (CMS/HCC) 2023    BRBPR (bright red blood per rectum) 2023    Fungal nail infection 2023    Right cervical radiculopathy 2023    Subacute cough 2023      Past Surgical History:   Procedure Laterality Date    CT GUIDED PERCUTANEOUS BIOPSY LUNG  2/10/2023    CT GUIDED PERCUTANEOUS BIOPSY LUNG 2/10/2023 [facility]      Social History     Socioeconomic History    Marital status:      Spouse name: Not on file    Number of children: Not on file    Years of education: Not on file    Highest education level: Not on file   Occupational History    Not on file   Tobacco Use    Smoking status: Former     Types: Cigarettes     Quit date:      Years since quittin.2    Smokeless tobacco:  Never   Substance and Sexual Activity    Alcohol use: Never    Drug use: Never    Sexual activity: Not on file   Other Topics Concern    Not on file   Social History Narrative    Not on file     Social Determinants of Health     Financial Resource Strain: Low Risk  (8/14/2023)    Overall Financial Resource Strain (CARDIA)     Difficulty of Paying Living Expenses: Not hard at all   Food Insecurity: No Food Insecurity (3/5/2024)    Hunger Vital Sign     Worried About Running Out of Food in the Last Year: Never true     Ran Out of Food in the Last Year: Never true   Transportation Needs: No Transportation Needs (8/14/2023)    PRAPARE - Transportation     Lack of Transportation (Medical): No     Lack of Transportation (Non-Medical): No   Physical Activity: Inactive (8/14/2023)    Exercise Vital Sign     Days of Exercise per Week: 0 days     Minutes of Exercise per Session: 0 min   Stress: No Stress Concern Present (8/14/2023)    [employer] [place] [employer] - Occupational Stress Questionnaire     Feeling of Stress : Not at all   Social Connections: Moderately Integrated (8/14/2023)    Social Connection and Isolation Panel [NHANES]     Frequency of Communication with Friends and Family: Three times a week     Frequency of Social Gatherings with Friends and Family: Once a week     Attends Anabaptist Services: Never     Active Member of Clubs or Organizations: Yes     Attends Club or Organization Meetings: More than 4 times per year     Marital Status:    Intimate Partner Violence: Not At Risk (8/14/2023)    Humiliation, Afraid, Rape, and Kick questionnaire     Fear of Current or Ex-Partner: No     Emotionally Abused: No     Physically Abused: No     Sexually Abused: No   Housing Stability: Unknown (8/14/2023)    Housing Stability Vital Sign     Unable to Pay for Housing in the Last Year: No     Number of Places Lived in the Last Year: Not on file     Unstable Housing in the Last Year: No      No Known  Allergies     Current Outpatient Medications:     cetirizine (ZyrTEC) 10 mg tablet, Take 1 tablet (10 mg) by mouth once daily as needed., Disp: , Rfl:     cholecalciferol (Vitamin D-3) 25 MCG (1000 UT) tablet, Take 1 tablet (25 mcg) by mouth., Disp: , Rfl:     citalopram (CeleXA) 20 mg tablet, TAKE 1 TABLET DAILY, Disp: 90 tablet, Rfl: 3    cyanocobalamin (Vitamin B-12) 500 mcg tablet, 1 tablet (500 mcg)., Disp: , Rfl:     fluticasone (Flonase) 50 mcg/actuation nasal spray, Administer into each nostril once daily., Disp: , Rfl:     folic acid (Folvite) 1 mg tablet, Take 1 tablet (1 mg) by mouth once daily., Disp: , Rfl:     hydroxychloroquine (Plaquenil) 200 mg tablet, Take 2 tablets (400 mg) by mouth once daily., Disp: 180 tablet, Rfl: 3    leflunomide (Arava) 20 mg tablet, Take 1 tablet (20 mg) by mouth once daily., Disp: 90 tablet, Rfl: 1    lidocaine (Lidoderm) 5 % patch, Place 1 patch over 12 hours on the skin once daily. Remove & discard patch within 12 hours or as directed by MD. (Patient not taking: Reported on 2/6/2024), Disp: 10 patch, Rfl: 0    losartan (Cozaar) 100 mg tablet, TAKE ONE-HALF (1/2) TABLET DAILY, Disp: 45 tablet, Rfl: 3    lovastatin (Mevacor) 20 mg tablet, TAKE 1 TABLET DAILY AS DIRECTED, Disp: 90 tablet, Rfl: 3    methotrexate (Trexall) 2.5 mg tablet, Take 3 tablets (7.5 mg total) by mouth., Disp: , Rfl:     multivitamin tablet, Take 1 tablet by mouth once daily., Disp: , Rfl:     omeprazole (PriLOSEC) 10 mg DR capsule, TAKE 1 CAPSULE DAILY, Disp: 90 capsule, Rfl: 3    orphenadrine (Norflex) 100 mg 12 hr tablet, Take 1 tablet (100 mg) by mouth 2 times a day as needed for muscle spasms for up to 10 days. Do not crush, chew, or split., Disp: 20 tablet, Rfl: 0    SAW PALMETTO ORAL, Take by mouth., Disp: , Rfl:     tiZANidine (Zanaflex) 4 mg tablet, tizanidine 4 mg tablet  TAKE 1 TABLET BY MOUTH THREE TIMES A DAY, Disp: , Rfl:     vits A,C,E/lutein/minerals (VIT A,C AND E-LUTEIN-MINERALS  ORAL), Take by mouth., Disp: , Rfl:      Objective   Visit Vitals  /76   Pulse 76   Temp 36.4 °C (97.6 °F)   Resp 20      Physical Exam:  General: AAOx3, Cooperative  Eyes: EOMI, conjunctiva clear, sclera white, anicteric  Throat/Mouth: No oral deformities, no cheek swelling, mucosa appear moist, no oral ulcers noted or loss of dentition   Skin: No rashes, ulcers or photosensitive areas  MSK: Upper Extremities:  Hand/Fingers: No erythema, edema, tenderness or warmth at DIP, PIP, or MCP joints, FROM grossly. Good hand . No nodules. No deformities   Wrists: No erythema, edema, warmth or tenderness at wrist, FROM grossly  Elbows: No tenderness, edema, erythema or warmth at elbows, FROM grossly. No nodules   Shoulders: No edema, erythema, tenderness or warmth at shoulders. FROM  Lower Extremities:   Hips: No obvious deformities. No joint tenderness, normal ROM grossly. No trochanteric bursae TTP  Knees: No tenderness, deformities, edema, rashes, or warmth, normal ROM grossly. No crepitus, no pes anserine bursa TTP   Ankles, feet: No deformities, tenderness, edema, erythema, ulceration, or warmth at the ankle or MTP/IP joints, normal ROM grossly  Spine: No spinal tenderness to palpation. No SI joint tenderness    Lab Results   Component Value Date    WBC 7.6 02/06/2024    HGB 12.1 (L) 02/06/2024    HCT 38.4 (L) 02/06/2024    MCV 92 02/06/2024     02/06/2024        Chemistry    Lab Results   Component Value Date/Time     01/21/2024 1737    K 4.7 01/21/2024 1737     01/21/2024 1737    CO2 24 01/21/2024 1737    BUN 23 01/21/2024 1737    CREATININE 1.35 (H) 01/21/2024 1737    Lab Results   Component Value Date/Time    CALCIUM 9.3 01/21/2024 1737    ALKPHOS 61 01/21/2024 1737    AST 20 01/21/2024 1737    ALT 14 01/21/2024 1737    BILITOT 0.4 01/21/2024 1737         Lab Results   Component Value Date    CRP 0.41 11/20/2023      Lab Results   Component Value Date    SEDRATE 15 11/20/2023      Lab  Results   Component Value Date    ALT 14 01/21/2024    AST 20 01/21/2024    ALKPHOS 61 01/21/2024    BILITOT 0.4 01/21/2024      === 02/10/23 ===  XR CHEST  No sizable pneumothorax status post recent biopsy.    CT chest 7/23:  Stable scattered pulmonary nodules measuring 1.7 cm or less in size.  No new suspicious pulmonary nodule or mass. Continued surveillance recommended as per Fleischner society guidelines.      Assessment/Plan    This is an 83 Y M with seropositive RA (, CCP 95), presenting for follow up  Last seen in 11/23    Labs:  2/24: Hb 12.1, rest of CBC  1/24: Hb 12.9, Cr 1.35, rest of CBC, CMP, UA, Uprt,   11/23: Hb 11.9, Cr 1.36, rest of CBC, CMP, ESR, CRP,   7/23: Hg improved, Cr 1.36  4/2023 ESR/CRP nml. ANCA with MPO/PR3 negative  3/21/23 Hg12. 4, GFR 57    CT chest abd pelvis 1/24:   1.  No CT evidence for acute aortic aneurysm or dissection  2. Mild plaque burden particularly involving distal abdominal aorta and the origin of bilateral renal arteries  3. Subcentimeter low-attenuation lesion involving dome of the liver which may represent a cyst but incompletely characterized in this exam  4. Left renal cyst  5. Prostatomegaly. Correlation with PSA levels recommended    CT chest 7/2023:   Stable scattered pulmonary nodules measuring 1.7 cm or less in size. No new suspicious pulmonary nodule or mass    PFTs 6/2023, no obstruction. DLCO nml     # Seropositive RA, CDAI: well controlled, no inflammatory arthritis or synovitis on exam. CDAI : 0 -> Remission. He was asking if we can switch Leflunomide, but no side effects and arthritis is well controlled, explained and will keep it for now  # Lung nodule- sees Pulm/Onc. Biopsy shows organizing PNA. Given inflammatory arthritis is overall well controlled, less likely RA related and notable there has been some decrease in size without change in RA medications    - Labs with next radha (CBC, CMP, ESR, CRP)  - Continue Leflunomide 20mg daily  - Continue  HCQ 400mg daily (eye exam UTD)  - Follow up with Pulm for cavitation/nodules  - Follow up with ophtho  - Vitamin D/ca . Denies hx of fragility fx  - Vaccine UTD including COVID 19, 2nd booster 6 weeks ago, flu vaccine 6 weeks ago, PNA and shingles vaccinations (2 series)   - Consider DEXA with next radha     RTC in 6 months    Plan, including risks and benefits, was discussed with the patient, informed on how to reach us.     To schedule an appointment, call [phone]  To reach the rheumatology office, call [phone]    Trice De Los Santos MD   Division of Rheumatology  [facility]

## 2024-03-18 ENCOUNTER — OFFICE VISIT (OUTPATIENT)
Dept: PULMONOLOGY | Facility: CLINIC | Age: 84
End: 2024-03-18
Payer: MEDICARE

## 2024-03-18 VITALS
HEIGHT: 73 IN | OXYGEN SATURATION: 92 % | WEIGHT: 241 LBS | BODY MASS INDEX: 31.94 KG/M2 | TEMPERATURE: 97.7 F | RESPIRATION RATE: 16 BRPM | SYSTOLIC BLOOD PRESSURE: 110 MMHG | DIASTOLIC BLOOD PRESSURE: 65 MMHG | HEART RATE: 71 BPM

## 2024-03-18 DIAGNOSIS — R09.82 POST-NASAL DRIP: ICD-10-CM

## 2024-03-18 DIAGNOSIS — R91.8 MULTIPLE LUNG NODULES: Primary | ICD-10-CM

## 2024-03-18 PROCEDURE — 1157F ADVNC CARE PLAN IN RCRD: CPT | Performed by: INTERNAL MEDICINE

## 2024-03-18 PROCEDURE — 3074F SYST BP LT 130 MM HG: CPT | Performed by: INTERNAL MEDICINE

## 2024-03-18 PROCEDURE — 3078F DIAST BP <80 MM HG: CPT | Performed by: INTERNAL MEDICINE

## 2024-03-18 PROCEDURE — 99214 OFFICE O/P EST MOD 30 MIN: CPT | Performed by: INTERNAL MEDICINE

## 2024-03-18 PROCEDURE — 1036F TOBACCO NON-USER: CPT | Performed by: INTERNAL MEDICINE

## 2024-03-18 PROCEDURE — 1159F MED LIST DOCD IN RCRD: CPT | Performed by: INTERNAL MEDICINE

## 2024-03-18 RX ORDER — BISMUTH SUBSALICYLATE 262 MG
1 TABLET,CHEWABLE ORAL DAILY
COMMUNITY

## 2024-03-18 NOTE — PROGRESS NOTES
Department of Medicine  Division of Pulmonary, Critical Care, and Sleep Medicine  Follow-Up Visit  [facility] - [address], [address]    Physician HPI:  Mr. Tripp is a pleasant 83-year-old man with past medical history significant for rheumatoid arthritis on leflunomide and hydroxychloroquine who presented to the office today regarding multiple pulmonary nodules.  Maurice has no acute respiratory symptoms today.  I reviewed his most recent CT scan of chest that was done in July 2023, previous CT scan in March and January 2023 and his most recent PET/CT scan. Multiple pulmonary nodules noted the largest is 1.7 cm in size. Nodules are solid and noncalcified. It did show minimal uptake on the PET CT scan. Underwent CT-guided biopsy previously which revealed fibro inflammatory changes with few lymphoplasmacytic inflammation that were negative for IgG stain.         Follow up 1/17/2024:  Respiratory status has been stable since last visit.   Repeated CT scan of chest revealed interval increase in the size of the previously noted pulmonary nodules. In addition, a few of the nodules are now cavitary. The patient denies acute cough, fevers, chills or night sweats since last visit. No acute chest pain or hemoptysis. No interval hx of RA flares up.     Follow up 03/18/2024:  Maurice presented today for follow up.   Reports intermittent productive cough, sputum light yellowish. Feels that cough has overall improved. No acute chest pain, fevers, or hemoptysis. + post nasal drip.     He is s/p percutaneous biopsy of the RUL nodule. Pathology revealed organizing pneumonia with chronic inflammation and focal area of necrosis.  Microbiology has been unremarkable.    Immunization History   Administered Date(s) Administered    Flu vaccine, quadrivalent, high-dose, preservative free, age 65y+ (FLUZONE) 09/16/2022    Flu vaccine, quadrivalent, no egg protein, age 6 month or greater (FLUCELVAX) 08/13/2019    Influenza, High Dose  Seasonal, Preservative Free 09/05/2016, 08/16/2017, 08/09/2018, 08/26/2020    Influenza, Unspecified 09/07/2021    Influenza, seasonal, injectable 09/09/2015, 01/01/2016, 09/01/2017, 10/08/2023    Influenza, trivalent, adjuvanted 09/05/2019    Moderna COVID-19 vaccine, bivalent, blue cap/gray label *Check age/dose* 10/02/2022    Moderna SARS-CoV-2 Vaccination 02/16/2021, 03/17/2021, 10/27/2021, 04/12/2022, 10/08/2023    PPD Test 02/07/2020    Pneumococcal conjugate vaccine, 13-valent (PREVNAR 13) 03/25/2015    Pneumococcal polysaccharide vaccine, 23-valent, age 2 years and older (PNEUMOVAX 23) 01/01/2016, 07/17/2018    Td vaccine, age 7 years and older (TENIVAC) 05/14/2008    Tdap vaccine, age 7 year and older (BOOSTRIX, ADACEL) 04/18/2019    Zoster vaccine, recombinant, adult (SHINGRIX) 04/02/2019    Zoster, live 02/27/2008       Current Outpatient Medications   Medication Instructions    cetirizine (ZyrTEC) 10 mg tablet 1 tablet, oral, Daily PRN    cholecalciferol (VITAMIN D-3) 25 mcg, oral    citalopram (CELEXA) 20 mg, oral, Daily    cyanocobalamin (VITAMIN B-12) 500 mcg    fluticasone (Flonase) 50 mcg/actuation nasal spray Each Nostril, Daily    folic acid (Folvite) 1 mg tablet 1 tablet, oral, Daily    hydroxychloroquine (PLAQUENIL) 400 mg, oral, Daily    leflunomide (ARAVA) 20 mg, oral, Daily    lidocaine (Lidoderm) 5 % patch 1 patch, transdermal, Daily, Remove & discard patch within 12 hours or as directed by MD.    losartan (Cozaar) 100 mg tablet oral, Daily    lovastatin (MEVACOR) 20 mg, oral, Daily, as directed    methotrexate (TREXALL) 7.5 mg, oral    multivitamin tablet 1 tablet, oral, Daily    omeprazole (PRILOSEC) 10 mg, oral, Daily    orphenadrine (NORFLEX) 100 mg, oral, 2 times daily PRN, Do not crush, chew, or split.    SAW PALMETTO ORAL oral    tiZANidine (Zanaflex) 4 mg tablet tizanidine 4 mg tablet   TAKE 1 TABLET BY MOUTH THREE TIMES A DAY    vits A,C,E/lutein/minerals (VIT A,C AND  "E-LUTEIN-MINERALS ORAL) oral        Allergies:  No Known Allergies       Visit Vitals  /65 (BP Location: Left arm, Patient Position: Sitting, BP Cuff Size: Large adult)   Pulse 71   Temp 36.5 °C (97.7 °F) (Temporal)   Resp 16   Ht 1.854 m (6' 1\")   Wt 109 kg (241 lb)   SpO2 92%   BMI 31.80 kg/m²   Smoking Status Former   BSA 2.37 m²      Physical Exam     Constitutional: Alert and in no acute distress. Well developed, well nourished.   Ears, Nose, Mouth, and Throat: External inspection of ears and nose: Normal.    Pulmonary: Chest: Normal to inspection. ~Respiratory effort: No increased work of breathing or signs of respiratory distress. ~Auscultation of lungs: Clear to auscultation bilaterally.    Cardiovascular: Heart rate and rhythm were normal, normal S1 and S2, no gallops, no murmurs and no pericardial rub.   Skin: Normal skin color and pigmentation, normal skin turgor, and no rash.   Psychiatric: Judgment and insight: Intact.~Alert and oriented to person, place and time.~Mood and affect: Normal.     Chest Radiograph     XR chest 1 view 01/21/2024    Narrative  STUDY:  Chest Radiograph;  1/21/2024 at 6:20 PM.  INDICATION:  Chest pain.  COMPARISON:  CT chest 1/10/2024, 7/10/2023, 3/22/2023; XR chest 10/14/2021  ACCESSION NUMBER(S):  [id number]  ORDERING CLINICIAN:  CHINO BRADY  TECHNIQUE:  Frontal chest was obtained at 18:20 hours.  FINDINGS:  CARDIOMEDIASTINAL SILHOUETTE:  Cardiomediastinal silhouette is normal in size and configuration.    LUNGS:  Bilaterally upper lobe apical nodules again noted, as well as in the  right lower lobe.  This corresponds with recent CT exam.    ABDOMEN:  No remarkable upper abdominal findings.    BONES:  No acute osseous changes.    Impression  Multiple lung nodules, no focal infiltrates seen.  Signed by Britton Kumar MD      XR CHEST 1 VIEW 02/10/2023    Narrative  MRN: [id number]  Patient Name: YI DAMON    STUDY:  CHEST 1 VIEW;  2/10/2023 1:49 " pm    INDICATION:  bilateral lung nodules. now s/p YOSI nodule bx. eval for PTX .    COMPARISON:  Chest radiograph 2/10/2023 10:51 a.m.    ACCESSION NUMBER(S):  [id number]    ORDERING CLINICIAN:  ISHAN CEJA    FINDINGS:  PA and lateral radiographs of the chest were provided.  Additional PA  dual energy images were also provided.        CARDIOMEDIASTINAL SILHOUETTE:  Cardiomediastinal silhouette is normal in size and configuration.    LUNGS:  No pneumothorax. No consolidation, pulmonary edema, or pleural  effusion.    ABDOMEN:  No remarkable upper abdominal findings.    BONES:  No acute osseous changes.    Impression  No sizable pneumothorax status post recent biopsy.    I personally reviewed the images/study and I agree with the findings  as stated by radiology resident Kristal Strauss MD. This study was  interpreted at [place], [place].      Echocardiogram     Echocardiogram     Narrative  [facility]  [address] [facility] [address]  Tel [phone] Fax [phone]    TRANSTHORACIC ECHOCARDIOGRAM REPORT      Patient Name:     YI DAMON Reading Physician:   [id number] Jaky Hoyos MD  Study Date:       7/13/2023      Referring Physician: JAKY HOYOS  MRN/PID:          [id number]       PCP:  Accession/Order#: [id number]   Department Location: [facility]  YOB: 1940       Fellow:  Gender:           M              Nurse:  Admit Date:                      Sonographer:         Mikki Luque RD  Admission Status: Outpatient     Additional Staff:  Height:           185.42 cm      CC Report to:  Weight:           108.41 kg      Study Type:          Echocardiogram  BSA:              2.32 m2  Blood Pressure: 120 /68 mmHg    Diagnosis/ICD: R06.09-Other forms of dyspnea  Indication:    Dyspnea  Procedure/CPT: Echo Complete w Full Doppler-[id number]  Study Detail: The following Echo studies were performed: 2D, M-Mode, color flow  and  Doppler. Definity used as a contrast agent for endocardial  border definition. Total contrast used for this procedure was 3 mL  via IV push.      PHYSICIAN INTERPRETATION:  Left Ventricle: Left ventricular systolic function is normal, with an estimated ejection fraction of 55-60%. There are no regional wall motion abnormalities. The left ventricular cavity size is normal. Spectral Doppler shows an impaired relaxation pattern of left ventricular diastolic filling.  Left Atrium: The left atrium is normal in size.  Right Ventricle: The right ventricle is normal in size. There is normal right ventricular global systolic function.  Right Atrium: The right atrium is normal in size.  Aortic Valve: The aortic valve appears structurally normal. There is mild aortic valve regurgitation. The peak instantaneous gradient of the aortic valve is 7.4 mmHg. The mean gradient of the aortic valve is 4.0 mmHg.  Mitral Valve: The mitral valve is normal in structure. There is no evidence of mitral valve regurgitation.  Tricuspid Valve: The tricuspid valve is structurally normal. There is trace tricuspid regurgitation. The estimated RVSP is 29 mm.  Pulmonic Valve: The pulmonic valve is structurally normal. There is trace pulmonic valve regurgitation.  Pericardium: There is no pericardial effusion noted.  Aorta: The aortic root is normal.      CONCLUSIONS:  1. Left ventricular systolic function is normal with a 55-60% estimated ejection fraction.  2. Spectral Doppler shows an impaired relaxation pattern of left ventricular diastolic filling.  3. The estimated RVSP is 29 mm.  4. Mild aortic valve regurgitation.    QUANTITATIVE DATA SUMMARY:  2D MEASUREMENTS:  Normal Ranges:  LAs:           4.30 cm   (2.7-4.0cm)  IVSd:          0.98 cm   (0.6-1.1cm)  LVPWd:         0.80 cm   (0.6-1.1cm)  LVIDd:         5.40 cm   (3.9-5.9cm)  LVIDs:         4.22 cm  LV Mass Index: 76.5 g/m2  LV % FS        21.9 %    LA VOLUME:  Normal Ranges:  LA Vol A4C:         41.2 ml    (22+/-6mL/m2)  LA Vol A2C:        43.6 ml  LA Vol BP:         42.5 ml  LA Vol Index A4C:  17.8ml/m2  LA Vol Index A2C:  18.8 ml/m2  LA Vol Index BP:   18.3 ml/m2  LA Area A4C:       16.2 cm2  LA Area A2C:       16.7 cm2  LA Major Axis A4C: 5.4 cm  LA Major Axis A2C: 5.4 cm  LA Volume Index:   16.8 ml/m2  LA Vol A4C:        37.0 ml  LA Vol A2C:        42.0 ml    AORTA MEASUREMENTS:  Normal Ranges:  Asc Ao, d: 3.75 cm (2.1-3.4cm)    LV SYSTOLIC FUNCTION BY 2D PLANIMETRY (MOD):  Normal Ranges:  EF-A4C View: 55.3 % (>=55%)  EF-A2C View: 55.6 %  EF-Biplane:  54.9 %    LV DIASTOLIC FUNCTION:  Normal Ranges:  MV Peak E:    0.49 m/s (0.7-1.2 m/s)  MV Peak A:    0.69 m/s (0.42-0.7 m/s)  E/A Ratio:    0.71     (1.0-2.2)  MV e'         0.07 m/s (>8.0)  MV lateral e' 0.09 m/s  MV medial e'  0.05 m/s  E/e' Ratio:   7.02     (<8.0)    MITRAL VALVE:  Normal Ranges:  MV DT: 289 msec (150-240msec)    AORTIC VALVE:  Normal Ranges:  AoV Vmax:                1.36 m/s (<=1.7m/s)  AoV Peak P.4 mmHg (<20mmHg)  AoV Mean P.0 mmHg (1.7-11.5mmHg)  LVOT Max Juaquin:            0.89 m/s (<=1.1m/s)  AoV VTI:                 25.90 cm (18-25cm)  LVOT VTI:                18.20 cm  LVOT Diameter:           2.00 cm  (1.8-2.4cm)  AoV Area, VTI:           2.21 cm2 (2.5-5.5cm2)  AoV Area,Vmax:           2.05 cm2 (2.5-4.5cm2)  AoV Dimensionless Index: 0.70    AORTIC INSUFFICIENCY:  AI Vmax:       4.15 m/s  AI Half-time:  652 msec  AI Decel Rate: 197.50 cm/s2      RIGHT VENTRICLE:  RV Basal 3.12 cm  RV Mid   2.13 cm  RV Major 7.3 cm  TAPSE:   23.3 mm  RV s'    0.11 m/s    TRICUSPID VALVE/RVSP:  Normal Ranges:  Peak TR Velocity: 1.98 m/s  RV Syst Pressure: 18.7 mmHg (< 30mmHg)      [id number] Bull Balderas MD  Electronically signed on 2023 at 6:55:35 PM       Chest CT Scan     CT chest wo IV contrast 01/10/2024    Narrative  Interpreted By:  Sandro Mayer,  STUDY:  CT CHEST WO IV CONTRAST;  1/10/2024 9:38  am    INDICATION:  Signs/Symptoms: multiple lung nodules - 6 months follow up  R91.8:  Lung nodules.    COMPARISON:  07/10/2023    ACCESSION NUMBER(S):  [id number]    ORDERING CLINICIAN:  TERESSA RODGERS    TECHNIQUE:  Helical data acquisition of the chest was obtained without the use of  IV contrast. Images were reformatted in axial, coronal, and sagittal  planes.    FINDINGS:  POTENTIAL LIMITATIONS OF THE STUDY:   Lack of IV contrast    HEART AND VESSELS:    There are atherosclerotic calcifications of the aorta and its  branches. The aorta is unchanged in course and caliber.    The heart is not significantly enlarged.    No pericardial effusion is seen.    MEDIASTINUM AND JOE, LOWER NECK AND AXILLA:  The visualized thyroid gland is within normal limits.    No evidence of thoracic lymphadenopathy by CT criteria.    Esophagus appears within normal limits as seen.    LUNGS AND AIRWAYS:  The trachea and central airways are patent. No endobronchial lesion.    There are numerous nodules scattered throughout the lungs which have  increased in size when compared to the previous examination. In  addition, a few of the nodules are now cavitary. A right upper lobe  nodule which measures up to 1.5 cm on today's study measured  approximately 1.3 cm on the previous study, image 72. Cavitary nodule  in the left upper lobe which measures 1.8 cm on today's exam measured  approximately 1.6 cm on the previous study, image 68. 9 mm nodule in  the right lower lobe measured 7 mm on the previous study, image 154.  The remaining nodules are either increased in size or are stable in  size. No definite new nodules are identified. No effusion. No  pneumothorax. Mild scattered areas of atelectasis/scarring.    UPPER ABDOMEN:  The visualized subdiaphragmatic structures demonstrate no acute  abnormality.    CHEST WALL AND OSSEOUS STRUCTURES:  Degenerative changes. No acute process.    Impression  Interval increase in the size of the  previously noted pulmonary  nodules. In addition, a few of the nodules are now cavitary. Findings  may relate to progression of metastatic disease or possibly  infectious/inflammatory process. Correlate with biopsy results.    MACRO:  None.    Signed by: Charito Mayer 1/11/2024 4:25 PM  Dictation workstation:   [id number]      CT chest wo IV contrast 07/10/2023    Narrative  Interpreted By:  CHARITO MAYER MD  MRN: [id number]  Patient Name: YI DAMON    STUDY:  CT CHEST WO CONTRAST;  7/10/2023 4:57 pm    INDICATION:  lung nodules  R91.8: Multiple pulmonary nodules determined by  computed tomography of lung.    COMPARISON:  03/22/2023    ACCESSION NUMBER(S):  [id number]    ORDERING CLINICIAN:  ANNIE ROSADO    TECHNIQUE:  Helical data acquisition of the chest was obtained without the use of  IV contrast. Images were reformatted in axial, coronal, and sagittal  planes.    FINDINGS:  POTENTIAL LIMITATIONS OF THE STUDY:   Lack of IV contrast    HEART AND VESSELS:    There are atherosclerotic calcifications of the aorta and its  branches. Aorta is unchanged in course and caliber.    The heart is unchanged in size.    No pericardial effusion is seen.    MEDIASTINUM AND JOE, LOWER NECK AND AXILLA:  The visualized thyroid gland is within normal limits.    No evidence of thoracic lymphadenopathy by CT criteria.    Esophagus is stable in course and caliber. Small hiatal hernia.    LUNGS AND AIRWAYS:  The trachea and central airways are patent. No endobronchial lesion.    The patient's known pulmonary nodules are again identified and are  not significantly changed in size, measuring up to 1.7 Cm. For  example, images 62, 58, 168, 191, 215 and 201 of 293. No new  suspicious pulmonary nodule or mass. No consolidation. No evidence of  an effusion or pneumothorax. There are mild scattered areas of  atelectasis/scarring.    UPPER ABDOMEN:  The visualized subdiaphragmatic structures demonstrate no acute  abnormality. Stable  subcentimeter hypoattenuating lesion in the right  hepatic lobe which is statistically most likely a cyst but is too  small to characterize. Left renal cyst is again partially  included/imaged.    CHEST WALL AND OSSEOUS STRUCTURES:  Degenerative changes. No acute process.    Impression  Stable scattered pulmonary nodules measuring 1.7 cm or less in size.  No new suspicious pulmonary nodule or mass. Continued surveillance  recommended as per Fleischner society guidelines.       Laboratory Studies     Lab Results   Component Value Date    WBC 7.6 02/06/2024    HGB 12.1 (L) 02/06/2024    HCT 38.4 (L) 02/06/2024    MCV 92 02/06/2024     02/06/2024      Lab Results   Component Value Date    GLUCOSE 93 01/21/2024    CALCIUM 9.3 01/21/2024     01/21/2024    K 4.7 01/21/2024    CO2 24 01/21/2024     01/21/2024    BUN 23 01/21/2024    CREATININE 1.35 (H) 01/21/2024      Lab Results   Component Value Date    ALT 14 01/21/2024    AST 20 01/21/2024    ALKPHOS 61 01/21/2024    BILITOT 0.4 01/21/2024        Protime   Date/Time Value Ref Range Status   02/06/2024 08:09 AM 11.9 9.8 - 12.8 seconds Final     INR   Date/Time Value Ref Range Status   02/06/2024 08:09 AM 1.1 0.9 - 1.1 Final           Assessment and Plan / Recommendations     1. Multiple pulmonary nodules:  Most likely related to rheumatoid arthritis.  PET/CT scan earlier last year revealed minimal metabolic activity indicating likely a benign etiology. S/P CT guided biopsy in Feb 2023 that was non-diagnostic.  Repeated percutaneous biopsy of the right upper lobe nodule revealed chronic inflammation with organizing pneumonia.  Fungal and AFB cultures are negative to date.  We will continue with observation and repeat CT scan in 6 months from most recent scan.    2.  Postnasal drip:  Use Flonase nasal spray twice per day in addition to oral antihistamine.    Follow-up in 4 months after CT scan is done.    Please excuse any misspellings or unintended  errors related to the Dragon speech recognition software used to dictate this note.         Obed Camarena MD   03/18/2024

## 2024-03-19 ENCOUNTER — OFFICE VISIT (OUTPATIENT)
Dept: RHEUMATOLOGY | Facility: CLINIC | Age: 84
End: 2024-03-19
Payer: MEDICARE

## 2024-03-19 VITALS
SYSTOLIC BLOOD PRESSURE: 124 MMHG | WEIGHT: 242 LBS | TEMPERATURE: 97.6 F | HEART RATE: 76 BPM | DIASTOLIC BLOOD PRESSURE: 76 MMHG | RESPIRATION RATE: 20 BRPM | BODY MASS INDEX: 31.93 KG/M2

## 2024-03-19 DIAGNOSIS — Z79.60 LONG-TERM USE OF IMMUNOSUPPRESSANT MEDICATION: ICD-10-CM

## 2024-03-19 DIAGNOSIS — E55.9 VITAMIN D DEFICIENCY: ICD-10-CM

## 2024-03-19 DIAGNOSIS — M05.79 RHEUMATOID ARTHRITIS INVOLVING MULTIPLE SITES WITH POSITIVE RHEUMATOID FACTOR (MULTI): Primary | ICD-10-CM

## 2024-03-19 DIAGNOSIS — M79.605 PAIN IN BOTH LOWER EXTREMITIES: ICD-10-CM

## 2024-03-19 DIAGNOSIS — R91.8 MULTIPLE PULMONARY NODULES DETERMINED BY COMPUTED TOMOGRAPHY OF LUNG: ICD-10-CM

## 2024-03-19 DIAGNOSIS — M79.604 PAIN IN BOTH LOWER EXTREMITIES: ICD-10-CM

## 2024-03-19 PROCEDURE — 1159F MED LIST DOCD IN RCRD: CPT | Performed by: STUDENT IN AN ORGANIZED HEALTH CARE EDUCATION/TRAINING PROGRAM

## 2024-03-19 PROCEDURE — 3078F DIAST BP <80 MM HG: CPT | Performed by: STUDENT IN AN ORGANIZED HEALTH CARE EDUCATION/TRAINING PROGRAM

## 2024-03-19 PROCEDURE — 1036F TOBACCO NON-USER: CPT | Performed by: STUDENT IN AN ORGANIZED HEALTH CARE EDUCATION/TRAINING PROGRAM

## 2024-03-19 PROCEDURE — 3074F SYST BP LT 130 MM HG: CPT | Performed by: STUDENT IN AN ORGANIZED HEALTH CARE EDUCATION/TRAINING PROGRAM

## 2024-03-19 PROCEDURE — 1160F RVW MEDS BY RX/DR IN RCRD: CPT | Performed by: STUDENT IN AN ORGANIZED HEALTH CARE EDUCATION/TRAINING PROGRAM

## 2024-03-19 PROCEDURE — 1157F ADVNC CARE PLAN IN RCRD: CPT | Performed by: STUDENT IN AN ORGANIZED HEALTH CARE EDUCATION/TRAINING PROGRAM

## 2024-03-19 PROCEDURE — 99214 OFFICE O/P EST MOD 30 MIN: CPT | Performed by: STUDENT IN AN ORGANIZED HEALTH CARE EDUCATION/TRAINING PROGRAM

## 2024-03-27 LAB
ACID FAST STN SPEC: NORMAL
MYCOBACTERIUM SPEC CULT: NORMAL

## 2024-04-23 ENCOUNTER — OFFICE VISIT (OUTPATIENT)
Dept: CARDIOLOGY | Facility: CLINIC | Age: 84
End: 2024-04-23
Payer: MEDICARE

## 2024-04-23 VITALS
HEIGHT: 73 IN | BODY MASS INDEX: 31.81 KG/M2 | SYSTOLIC BLOOD PRESSURE: 132 MMHG | WEIGHT: 240 LBS | DIASTOLIC BLOOD PRESSURE: 68 MMHG | OXYGEN SATURATION: 93 % | HEART RATE: 68 BPM

## 2024-04-23 DIAGNOSIS — M79.604 PAIN IN BOTH LOWER EXTREMITIES: ICD-10-CM

## 2024-04-23 DIAGNOSIS — M79.605 PAIN IN BOTH LOWER EXTREMITIES: ICD-10-CM

## 2024-04-23 DIAGNOSIS — I73.9 PAD (PERIPHERAL ARTERY DISEASE) (CMS-HCC): ICD-10-CM

## 2024-04-23 PROCEDURE — 1036F TOBACCO NON-USER: CPT | Performed by: STUDENT IN AN ORGANIZED HEALTH CARE EDUCATION/TRAINING PROGRAM

## 2024-04-23 PROCEDURE — 1157F ADVNC CARE PLAN IN RCRD: CPT | Performed by: STUDENT IN AN ORGANIZED HEALTH CARE EDUCATION/TRAINING PROGRAM

## 2024-04-23 PROCEDURE — 1159F MED LIST DOCD IN RCRD: CPT | Performed by: STUDENT IN AN ORGANIZED HEALTH CARE EDUCATION/TRAINING PROGRAM

## 2024-04-23 PROCEDURE — 1160F RVW MEDS BY RX/DR IN RCRD: CPT | Performed by: STUDENT IN AN ORGANIZED HEALTH CARE EDUCATION/TRAINING PROGRAM

## 2024-04-23 PROCEDURE — 3078F DIAST BP <80 MM HG: CPT | Performed by: STUDENT IN AN ORGANIZED HEALTH CARE EDUCATION/TRAINING PROGRAM

## 2024-04-23 PROCEDURE — 99213 OFFICE O/P EST LOW 20 MIN: CPT | Performed by: STUDENT IN AN ORGANIZED HEALTH CARE EDUCATION/TRAINING PROGRAM

## 2024-04-23 PROCEDURE — 3075F SYST BP GE 130 - 139MM HG: CPT | Performed by: STUDENT IN AN ORGANIZED HEALTH CARE EDUCATION/TRAINING PROGRAM

## 2024-04-23 NOTE — PROGRESS NOTES
Referred by Dr. De Los Santos, Trice WAGNER MD  Chief complaint:   Chief Complaint   Patient presents with    Consult     N[facility] Dr De Los Santos referral with pain in both lower extremities        History of Present Illness  Maurice Tripp is a 83 y.o. year old male patient with history of hypertension, hyperlipidemia, abnormal thyroid arthritis who is presenting for evaluation of pain in both feet.    Patient reports noticing over the last several months that he has feet feel heavy at night.  Describes sensation as having heavy boots in both feet at night.  When in bed this sensation resolves when he stands up and starts walking.   Prior hammertoe surgery.  Reports when walking is limited by SOB. Reports when walking feet hurt, predominantly right leg.   Denies claudication or foot wounds.  No lower extremity edema.  No history of prior vascular interventions.    Social History     Tobacco Use    Smoking status: Former     Current packs/day: 0.00     Types: Cigarettes     Quit date:      Years since quittin.3    Smokeless tobacco: Never   Substance Use Topics    Alcohol use: Never    Drug use: Never       Outpatient Medications:  Current Outpatient Medications   Medication Instructions    cetirizine (ZyrTEC) 10 mg tablet 1 tablet, oral, Daily PRN    citalopram (CELEXA) 20 mg, oral, Daily    fluticasone (Flonase) 50 mcg/actuation nasal spray Each Nostril, Daily    hydroxychloroquine (PLAQUENIL) 400 mg, oral, Daily    leflunomide (ARAVA) 20 mg, oral, Daily    lidocaine (Lidoderm) 5 % patch 1 patch, transdermal, Daily, Remove & discard patch within 12 hours or as directed by MD.    losartan (Cozaar) 100 mg tablet oral, Daily    lovastatin (MEVACOR) 20 mg, oral, Daily, as directed    multivitamin tablet 1 tablet, oral, Daily    omeprazole (PRILOSEC) 10 mg, oral, Daily    orphenadrine (NORFLEX) 100 mg, oral, 2 times daily PRN, Do not crush, chew, or split.    tiZANidine (Zanaflex) 4 mg tablet tizanidine 4 mg tablet   TAKE 1  TABLET BY MOUTH THREE TIMES A DAY    vits A,C,E/lutein/minerals (VIT A,C AND E-LUTEIN-MINERALS ORAL) oral         Vitals:  Vitals:    04/23/24 1246   BP: 132/68   Pulse: 68   SpO2: 93%       Physical Exam:  General: NAD, well-appearing  HEENT: moist mucous membranes, no jaundice  Neck: No JVD, no carotid bruit  Lungs: CTA israel, no wheezing or rales  Cardiac: RRR, no murmurs  Abdomen: soft, non-tender, non-distended  Extremities: 2+ radial pulses, no edema, 2+ DP/PT pulses, spider veins  Skin: warm, dry, no wound  Neurologic: AAOx3,  no focal deficits         Assessment/Plan       # Pain both feet  -Based on his description of symptoms this is more consistent with neuropathy  -Patient has strong 2+ palpable pulses in both feet.  He is unlikely to have obstructive PAD.  Will obtain PABLO for completeness.  Will call him with results  -Recommend to follow-up with PCP workup and management of neuropathy      Kenny Lozano MD [facility]  Interventional Cardiology  Endovascular Interventions  [email or web address]    **Disclaimer: This note was dictated by speech recognition, and every effort has been made to prevent any error in transcription, however minor errors may be present**

## 2024-05-06 ENCOUNTER — HOSPITAL ENCOUNTER (OUTPATIENT)
Dept: CARDIOLOGY | Facility: HOSPITAL | Age: 84
Discharge: HOME | End: 2024-05-06
Payer: MEDICARE

## 2024-05-06 DIAGNOSIS — M79.604 PAIN IN BOTH LOWER EXTREMITIES: ICD-10-CM

## 2024-05-06 DIAGNOSIS — I73.9 PAD (PERIPHERAL ARTERY DISEASE) (CMS-HCC): ICD-10-CM

## 2024-05-06 DIAGNOSIS — M79.605 PAIN IN BOTH LOWER EXTREMITIES: ICD-10-CM

## 2024-05-06 PROCEDURE — 93922 UPR/L XTREMITY ART 2 LEVELS: CPT

## 2024-05-06 PROCEDURE — 93922 UPR/L XTREMITY ART 2 LEVELS: CPT | Performed by: SURGERY

## 2024-05-10 ENCOUNTER — LAB (OUTPATIENT)
Dept: LAB | Facility: LAB | Age: 84
End: 2024-05-10
Payer: MEDICARE

## 2024-05-10 ENCOUNTER — OFFICE VISIT (OUTPATIENT)
Dept: PRIMARY CARE | Facility: CLINIC | Age: 84
End: 2024-05-10
Payer: MEDICARE

## 2024-05-10 VITALS
TEMPERATURE: 97.5 F | OXYGEN SATURATION: 92 % | DIASTOLIC BLOOD PRESSURE: 82 MMHG | HEART RATE: 70 BPM | WEIGHT: 242 LBS | RESPIRATION RATE: 16 BRPM | BODY MASS INDEX: 31.93 KG/M2 | SYSTOLIC BLOOD PRESSURE: 133 MMHG

## 2024-05-10 DIAGNOSIS — E55.9 VITAMIN D DEFICIENCY: ICD-10-CM

## 2024-05-10 DIAGNOSIS — G62.0 DRUG-INDUCED POLYNEUROPATHY (MULTI): ICD-10-CM

## 2024-05-10 DIAGNOSIS — E53.8 VITAMIN B12 DEFICIENCY: ICD-10-CM

## 2024-05-10 DIAGNOSIS — G57.93 NEUROPATHY OF BOTH FEET: ICD-10-CM

## 2024-05-10 DIAGNOSIS — E83.10 DISORDER OF IRON METABOLISM: ICD-10-CM

## 2024-05-10 DIAGNOSIS — M06.9 RHEUMATOID ARTHRITIS, INVOLVING UNSPECIFIED SITE, UNSPECIFIED WHETHER RHEUMATOID FACTOR PRESENT (MULTI): Primary | ICD-10-CM

## 2024-05-10 DIAGNOSIS — M06.9 RHEUMATOID ARTHRITIS, INVOLVING UNSPECIFIED SITE, UNSPECIFIED WHETHER RHEUMATOID FACTOR PRESENT (MULTI): ICD-10-CM

## 2024-05-10 DIAGNOSIS — M05.79 RHEUMATOID ARTHRITIS INVOLVING MULTIPLE SITES WITH POSITIVE RHEUMATOID FACTOR (MULTI): ICD-10-CM

## 2024-05-10 LAB
25(OH)D3 SERPL-MCNC: 39 NG/ML (ref 30–100)
ALBUMIN SERPL BCP-MCNC: 4.2 G/DL (ref 3.4–5)
ALP SERPL-CCNC: 65 U/L (ref 33–136)
ALT SERPL W P-5'-P-CCNC: 13 U/L (ref 10–52)
ANION GAP SERPL CALC-SCNC: 14 MMOL/L (ref 10–20)
AST SERPL W P-5'-P-CCNC: 19 U/L (ref 9–39)
BASOPHILS # BLD AUTO: 0.07 X10*3/UL (ref 0–0.1)
BASOPHILS NFR BLD AUTO: 0.8 %
BILIRUB SERPL-MCNC: 0.4 MG/DL (ref 0–1.2)
BUN SERPL-MCNC: 20 MG/DL (ref 6–23)
CALCIUM SERPL-MCNC: 9.3 MG/DL (ref 8.6–10.3)
CHLORIDE SERPL-SCNC: 107 MMOL/L (ref 98–107)
CO2 SERPL-SCNC: 23 MMOL/L (ref 21–32)
CREAT SERPL-MCNC: 1.26 MG/DL (ref 0.5–1.3)
CRP SERPL-MCNC: 1.07 MG/DL
EGFRCR SERPLBLD CKD-EPI 2021: 57 ML/MIN/1.73M*2
EOSINOPHIL # BLD AUTO: 0.49 X10*3/UL (ref 0–0.4)
EOSINOPHIL NFR BLD AUTO: 5.7 %
ERYTHROCYTE [DISTWIDTH] IN BLOOD BY AUTOMATED COUNT: 13.5 % (ref 11.5–14.5)
ERYTHROCYTE [SEDIMENTATION RATE] IN BLOOD BY WESTERGREN METHOD: 32 MM/H (ref 0–20)
FERRITIN SERPL-MCNC: 513 NG/ML (ref 20–300)
GLUCOSE SERPL-MCNC: 99 MG/DL (ref 74–99)
HCT VFR BLD AUTO: 37.4 % (ref 41–52)
HGB BLD-MCNC: 11.8 G/DL (ref 13.5–17.5)
IMM GRANULOCYTES # BLD AUTO: 0.04 X10*3/UL (ref 0–0.5)
IMM GRANULOCYTES NFR BLD AUTO: 0.5 % (ref 0–0.9)
IRON SATN MFR SERPL: 22 % (ref 25–45)
IRON SERPL-MCNC: 63 UG/DL (ref 35–150)
LYMPHOCYTES # BLD AUTO: 1.95 X10*3/UL (ref 0.8–3)
LYMPHOCYTES NFR BLD AUTO: 22.5 %
MCH RBC QN AUTO: 29.2 PG (ref 26–34)
MCHC RBC AUTO-ENTMCNC: 31.6 G/DL (ref 32–36)
MCV RBC AUTO: 93 FL (ref 80–100)
MONOCYTES # BLD AUTO: 1.05 X10*3/UL (ref 0.05–0.8)
MONOCYTES NFR BLD AUTO: 12.1 %
NEUTROPHILS # BLD AUTO: 5.05 X10*3/UL (ref 1.6–5.5)
NEUTROPHILS NFR BLD AUTO: 58.4 %
NRBC BLD-RTO: 0 /100 WBCS (ref 0–0)
PLATELET # BLD AUTO: 240 X10*3/UL (ref 150–450)
POTASSIUM SERPL-SCNC: 4.8 MMOL/L (ref 3.5–5.3)
PROT SERPL-MCNC: 6.9 G/DL (ref 6.4–8.2)
RBC # BLD AUTO: 4.04 X10*6/UL (ref 4.5–5.9)
SODIUM SERPL-SCNC: 139 MMOL/L (ref 136–145)
TIBC SERPL-MCNC: 284 UG/DL (ref 240–445)
TSH SERPL-ACNC: 1.5 MIU/L (ref 0.44–3.98)
UIBC SERPL-MCNC: 221 UG/DL (ref 110–370)
VIT B12 SERPL-MCNC: 441 PG/ML (ref 211–911)
WBC # BLD AUTO: 8.7 X10*3/UL (ref 4.4–11.3)

## 2024-05-10 PROCEDURE — 1036F TOBACCO NON-USER: CPT | Performed by: STUDENT IN AN ORGANIZED HEALTH CARE EDUCATION/TRAINING PROGRAM

## 2024-05-10 PROCEDURE — 82728 ASSAY OF FERRITIN: CPT

## 2024-05-10 PROCEDURE — 36415 COLL VENOUS BLD VENIPUNCTURE: CPT

## 2024-05-10 PROCEDURE — 84443 ASSAY THYROID STIM HORMONE: CPT

## 2024-05-10 PROCEDURE — 83540 ASSAY OF IRON: CPT

## 2024-05-10 PROCEDURE — 99214 OFFICE O/P EST MOD 30 MIN: CPT | Performed by: STUDENT IN AN ORGANIZED HEALTH CARE EDUCATION/TRAINING PROGRAM

## 2024-05-10 PROCEDURE — 1160F RVW MEDS BY RX/DR IN RCRD: CPT | Performed by: STUDENT IN AN ORGANIZED HEALTH CARE EDUCATION/TRAINING PROGRAM

## 2024-05-10 PROCEDURE — 85652 RBC SED RATE AUTOMATED: CPT

## 2024-05-10 PROCEDURE — 1159F MED LIST DOCD IN RCRD: CPT | Performed by: STUDENT IN AN ORGANIZED HEALTH CARE EDUCATION/TRAINING PROGRAM

## 2024-05-10 PROCEDURE — 85025 COMPLETE CBC W/AUTO DIFF WBC: CPT

## 2024-05-10 PROCEDURE — 83550 IRON BINDING TEST: CPT

## 2024-05-10 PROCEDURE — 82607 VITAMIN B-12: CPT

## 2024-05-10 PROCEDURE — 82306 VITAMIN D 25 HYDROXY: CPT

## 2024-05-10 PROCEDURE — 3079F DIAST BP 80-89 MM HG: CPT | Performed by: STUDENT IN AN ORGANIZED HEALTH CARE EDUCATION/TRAINING PROGRAM

## 2024-05-10 PROCEDURE — 86140 C-REACTIVE PROTEIN: CPT

## 2024-05-10 PROCEDURE — 3075F SYST BP GE 130 - 139MM HG: CPT | Performed by: STUDENT IN AN ORGANIZED HEALTH CARE EDUCATION/TRAINING PROGRAM

## 2024-05-10 PROCEDURE — 80053 COMPREHEN METABOLIC PANEL: CPT

## 2024-05-10 PROCEDURE — 1157F ADVNC CARE PLAN IN RCRD: CPT | Performed by: STUDENT IN AN ORGANIZED HEALTH CARE EDUCATION/TRAINING PROGRAM

## 2024-05-10 ASSESSMENT — ENCOUNTER SYMPTOMS
FEVER: 0
NAUSEA: 0
LIGHT-HEADEDNESS: 0
VOMITING: 0
SHORTNESS OF BREATH: 0
DIZZINESS: 0

## 2024-05-10 NOTE — PROGRESS NOTES
Subjective   Maurice Tripp is a 83 y.o. male who presents for Neuro Problem (Pt here today to discuss vascular appointment and neuropathy).  Patient seen today for follow-up neuropathy, has been a chronic concern.  He does not have any nerve pain or tingling but has significant numbness.  States is affecting while he drives.  He has not had any falls, but states he can feel it during physical activity.    Patient evaluation with cardiology had ABIs which were unremarkable.    Patient's concern of other evaluation, significance.  Of rheumatoid thrice he is on DMARDs that can affect peripheral neuropathy including leflunomide.  He states with the neuropathy is in the lower extremities only, no changes to upper extremities.    Denies any joint pain, fevers, chills, chest pain, shortness of breath or other symptoms at this time    Neuro Problem  Pertinent negatives include no chest pain, fever, nausea or vomiting.       Review of Systems   Constitutional:  Negative for fever.   Respiratory:  Negative for shortness of breath.    Cardiovascular:  Negative for chest pain.   Gastrointestinal:  Negative for nausea and vomiting.   Neurological:  Negative for dizziness and light-headedness.   All other systems reviewed and are negative.      Objective   Physical Exam  Vitals reviewed.   Constitutional:       General: He is not in acute distress.     Appearance: Normal appearance. He is not toxic-appearing.   HENT:      Head: Normocephalic and atraumatic.      Nose: Nose normal.   Eyes:      Extraocular Movements: Extraocular movements intact.   Cardiovascular:      Rate and Rhythm: Normal rate and regular rhythm.      Heart sounds: No murmur heard.     No friction rub. No gallop.   Pulmonary:      Effort: Pulmonary effort is normal. No respiratory distress.      Breath sounds: Normal breath sounds. No wheezing, rhonchi or rales.   Skin:     General: Skin is warm and dry.   Neurological:      General: No focal deficit present.       Mental Status: He is alert.      Sensory: Sensory deficit present.      Comments: Decreased pinprick sensation to plantar aspects of feet bilateral   Psychiatric:         Mood and Affect: Mood normal.         Behavior: Behavior normal.         Assessment/Plan   Problem List Items Addressed This Visit       Rheumatoid arthritis (Multi) - Primary    Relevant Orders    CBC    Comprehensive Metabolic Panel    TSH with reflex to Free T4 if abnormal    Vitamin D deficiency    Relevant Orders    Vitamin D 25-Hydroxy,Total (for eval of Vitamin D levels)     Other Visit Diagnoses       Drug-induced polyneuropathy (Multi)        Relevant Orders    CBC    Comprehensive Metabolic Panel    TSH with reflex to Free T4 if abnormal    Vitamin D 25-Hydroxy,Total (for eval of Vitamin D levels)    Vitamin B12    EMG & nerve conduction    Vitamin B12 deficiency        Relevant Orders    Vitamin B12    Neuropathy of both feet        Relevant Orders    TSH with reflex to Free T4 if abnormal          Patient seen today for follow-up of neuropathy  ABIs were unremarkable.  Normal blood flow and pulses.  We will order an EMG as well as metabolic workup including TSH, vitamin D, vitamin B12, CBC, CMP to look for other reversible causes.    Follow-up and discuss with rheumatology if workup is unremarkable.    Follow-up as new concerns arise.

## 2024-05-13 ENCOUNTER — TELEPHONE (OUTPATIENT)
Dept: PRIMARY CARE | Facility: CLINIC | Age: 84
End: 2024-05-13
Payer: MEDICARE

## 2024-05-13 DIAGNOSIS — M05.79 RHEUMATOID ARTHRITIS INVOLVING MULTIPLE SITES WITH POSITIVE RHEUMATOID FACTOR (MULTI): Primary | ICD-10-CM

## 2024-05-13 RX ORDER — PREDNISONE 10 MG/1
TABLET ORAL DAILY
Qty: 15 TABLET | Refills: 1 | Status: SHIPPED | OUTPATIENT
Start: 2024-05-13 | End: 2024-05-28

## 2024-05-13 NOTE — TELEPHONE ENCOUNTER
----- Message from Toney Almeida DO sent at 5/12/2024 11:09 PM EDT -----  Hello,    Overall the lab work does look good, your anemia is present and slightly lower than your baseline.  It is unlikely to be contributing to the neuropathy, your iron levels were normal.  This means the anemia is likely related to the RA.    Continue evaluation with EMG and we will let you know the results when we get them.    Toney Almeida DO

## 2024-05-13 NOTE — PROGRESS NOTES
RA flare  Slight anemia and rise in his CRP  Can't close his fingers, Tylenol is not helping much  -> steroid course for 2 weeks

## 2024-05-30 ENCOUNTER — OFFICE VISIT (OUTPATIENT)
Dept: PRIMARY CARE | Facility: CLINIC | Age: 84
End: 2024-05-30
Payer: MEDICARE

## 2024-05-30 VITALS
SYSTOLIC BLOOD PRESSURE: 128 MMHG | WEIGHT: 240 LBS | RESPIRATION RATE: 14 BRPM | HEART RATE: 73 BPM | DIASTOLIC BLOOD PRESSURE: 68 MMHG | BODY MASS INDEX: 31.66 KG/M2 | TEMPERATURE: 97.2 F

## 2024-05-30 DIAGNOSIS — J30.89 ENVIRONMENTAL AND SEASONAL ALLERGIES: Primary | ICD-10-CM

## 2024-05-30 DIAGNOSIS — J30.9 ALLERGIC RHINITIS, UNSPECIFIED SEASONALITY, UNSPECIFIED TRIGGER: ICD-10-CM

## 2024-05-30 PROCEDURE — 99213 OFFICE O/P EST LOW 20 MIN: CPT

## 2024-05-30 PROCEDURE — 3078F DIAST BP <80 MM HG: CPT

## 2024-05-30 PROCEDURE — 1036F TOBACCO NON-USER: CPT

## 2024-05-30 PROCEDURE — 1159F MED LIST DOCD IN RCRD: CPT

## 2024-05-30 PROCEDURE — 1160F RVW MEDS BY RX/DR IN RCRD: CPT

## 2024-05-30 PROCEDURE — 3074F SYST BP LT 130 MM HG: CPT

## 2024-05-30 PROCEDURE — 1157F ADVNC CARE PLAN IN RCRD: CPT

## 2024-05-30 RX ORDER — FLUTICASONE PROPIONATE 50 MCG
1 SPRAY, SUSPENSION (ML) NASAL DAILY
Qty: 16 G | Refills: 2 | Status: SHIPPED | OUTPATIENT
Start: 2024-05-30 | End: 2025-05-30

## 2024-05-30 RX ORDER — MINERAL OIL
180 ENEMA (ML) RECTAL DAILY
Qty: 30 TABLET | Refills: 1 | Status: SHIPPED | OUTPATIENT
Start: 2024-05-30 | End: 2025-05-30

## 2024-05-30 ASSESSMENT — ENCOUNTER SYMPTOMS
SORE THROAT: 1
LIGHT-HEADEDNESS: 0
RHINORRHEA: 1
PALPITATIONS: 0
VOMITING: 0
VOICE CHANGE: 0
FEVER: 0
NAUSEA: 0
CHEST TIGHTNESS: 0
SHORTNESS OF BREATH: 0
DIZZINESS: 0
WHEEZING: 0

## 2024-05-30 NOTE — PROGRESS NOTES
Subjective   HPI  Maurice Tripp is a 83 y.o. male who is here for acute complaint of right side of throat soreness and right side of face tenderness for the past week. Denies fevers, chills, pain with chewing, cough, congestion. Does have allergies year round and they are worse right now. Takes Flonase occasionally for seasonal allergies which have been worse as of late.     Review of Systems   Constitutional:  Negative for fever.   HENT:  Positive for postnasal drip, rhinorrhea and sore throat. Negative for congestion, dental problem, drooling, ear discharge, ear pain, mouth sores and voice change.    Respiratory:  Negative for chest tightness, shortness of breath and wheezing.    Cardiovascular:  Negative for chest pain and palpitations.   Gastrointestinal:  Negative for nausea and vomiting.   Neurological:  Negative for dizziness and light-headedness.   All other systems reviewed and are negative.      Objective   /68 (BP Location: Right arm, Patient Position: Sitting)   Pulse 73   Temp 36.2 °C (97.2 °F) (Temporal)   Resp 14   Wt 109 kg (240 lb)   BMI 31.66 kg/m²     Physical Exam  Vitals reviewed.   Constitutional:       General: He is not in acute distress.     Appearance: Normal appearance. He is not toxic-appearing.   HENT:      Head: Normocephalic and atraumatic.      Right Ear: Tympanic membrane normal.      Left Ear: Tympanic membrane normal.      Nose: Rhinorrhea present.      Right Turbinates: Pale.      Left Turbinates: Pale.      Mouth/Throat:      Mouth: Mucous membranes are moist. No oral lesions.      Dentition: Normal dentition.      Pharynx: Uvula midline. No pharyngeal swelling, oropharyngeal exudate, posterior oropharyngeal erythema or uvula swelling.      Tonsils: No tonsillar exudate or tonsillar abscesses.   Eyes:      Extraocular Movements: Extraocular movements intact.   Cardiovascular:      Rate and Rhythm: Normal rate and regular rhythm.      Heart sounds: No murmur heard.      "No friction rub. No gallop.   Pulmonary:      Effort: Pulmonary effort is normal. No respiratory distress.      Breath sounds: Normal breath sounds. No wheezing, rhonchi or rales.   Skin:     General: Skin is warm and dry.   Neurological:      General: No focal deficit present.      Mental Status: He is alert.         Assessment/Plan   Problem List Items Addressed This Visit       Environmental and seasonal allergies - Primary     Symptoms of scratchy throat and nasal secretions most likely secondary to untreated seasonal allergies.  Recommend that she use Flonase daily for the next 14 days to have full benefit as well as taking Allegra as needed once per day for controlling allergies and especially on days where he will be working outside.  HEENT examination is reassuring and there is no swelling, erythema, uvular deviation, hot potato voice, fevers, chills.  If symptoms do not improve or resolve she will return for further workup and management.         Relevant Medications    fexofenadine (Allegra) 180 mg tablet    fluticasone (Flonase) 50 mcg/actuation nasal spray    Allergic rhinitis    Relevant Medications    fexofenadine (Allegra) 180 mg tablet    fluticasone (Flonase) 50 mcg/actuation nasal spray       This note was partially created using voice recognition software and is inherently subject to errors including those of syntax and \"sound-alike\" substitutions which may escape proofreading. In such instances, original meaning may be extrapolated by contextual derivation.  "

## 2024-05-30 NOTE — ASSESSMENT & PLAN NOTE
Symptoms of scratchy throat and nasal secretions most likely secondary to untreated seasonal allergies.  Recommend that she use Flonase daily for the next 14 days to have full benefit as well as taking Allegra as needed once per day for controlling allergies and especially on days where he will be working outside.  HEENT examination is reassuring and there is no swelling, erythema, uvular deviation, hot potato voice, fevers, chills.  If symptoms do not improve or resolve she will return for further workup and management.

## 2024-06-03 NOTE — PROGRESS NOTES
I reviewed with the resident the medical history and the resident’s findings on physical examination.  I discussed with the resident the patient’s diagnosis and concur with the treatment plan as documented in the resident note.     Toney Almeida, DO

## 2024-06-17 DIAGNOSIS — M06.9 RHEUMATOID ARTHRITIS INVOLVING MULTIPLE SITES, UNSPECIFIED WHETHER RHEUMATOID FACTOR PRESENT (MULTI): ICD-10-CM

## 2024-06-17 RX ORDER — LEFLUNOMIDE 20 MG/1
20 TABLET ORAL DAILY
Qty: 90 TABLET | Refills: 3 | Status: SHIPPED | OUTPATIENT
Start: 2024-06-17

## 2024-07-02 ENCOUNTER — TELEPHONE (OUTPATIENT)
Dept: PULMONOLOGY | Facility: CLINIC | Age: 84
End: 2024-07-02
Payer: MEDICARE

## 2024-07-02 ENCOUNTER — APPOINTMENT (OUTPATIENT)
Dept: RADIOLOGY | Facility: HOSPITAL | Age: 84
End: 2024-07-02
Payer: MEDICARE

## 2024-07-09 ENCOUNTER — HOSPITAL ENCOUNTER (OUTPATIENT)
Dept: NEUROLOGY | Facility: HOSPITAL | Age: 84
Discharge: HOME | End: 2024-07-09
Payer: MEDICARE

## 2024-07-09 DIAGNOSIS — G60.8 POLYNEUROPATHY, PERIPHERAL SENSORIMOTOR AXONAL: Primary | ICD-10-CM

## 2024-07-09 DIAGNOSIS — G62.0 DRUG-INDUCED POLYNEUROPATHY (MULTI): ICD-10-CM

## 2024-07-09 PROCEDURE — 95910 NRV CNDJ TEST 7-8 STUDIES: CPT | Performed by: PSYCHIATRY & NEUROLOGY

## 2024-07-09 PROCEDURE — 95886 MUSC TEST DONE W/N TEST COMP: CPT | Performed by: PSYCHIATRY & NEUROLOGY

## 2024-07-12 ENCOUNTER — HOSPITAL ENCOUNTER (OUTPATIENT)
Dept: RADIOLOGY | Facility: HOSPITAL | Age: 84
Discharge: HOME | End: 2024-07-12
Payer: MEDICARE

## 2024-07-12 DIAGNOSIS — R91.8 MULTIPLE LUNG NODULES: ICD-10-CM

## 2024-07-12 PROCEDURE — 71250 CT THORAX DX C-: CPT

## 2024-07-24 ENCOUNTER — APPOINTMENT (OUTPATIENT)
Dept: PULMONOLOGY | Facility: CLINIC | Age: 84
End: 2024-07-24
Payer: MEDICARE

## 2024-07-26 ENCOUNTER — APPOINTMENT (OUTPATIENT)
Dept: PULMONOLOGY | Facility: CLINIC | Age: 84
End: 2024-07-26
Payer: MEDICARE

## 2024-07-26 DIAGNOSIS — R91.8 LUNG NODULES: Primary | ICD-10-CM

## 2024-07-26 PROCEDURE — 1157F ADVNC CARE PLAN IN RCRD: CPT | Performed by: INTERNAL MEDICINE

## 2024-08-07 DIAGNOSIS — M06.9 RHEUMATOID ARTHRITIS, INVOLVING UNSPECIFIED SITE, UNSPECIFIED WHETHER RHEUMATOID FACTOR PRESENT (MULTI): ICD-10-CM

## 2024-08-08 RX ORDER — HYDROXYCHLOROQUINE SULFATE 200 MG/1
TABLET, FILM COATED ORAL DAILY
Qty: 180 TABLET | Refills: 3 | Status: SHIPPED | OUTPATIENT
Start: 2024-08-08

## 2024-09-09 NOTE — PROGRESS NOTES
Subjective   Patient ID: [id number]   Maurice Tripp is a 84 y.o. 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 Venegas, had growth in his pulmonary nodules with fatigue and cough, ordered a repeat biopsy  02/2024 - Biopsy of YOSI nodule with organizing PNA, chronic inflammation, focal necrosis (possible granuloma)   Repeat CT in 6 months   Saw hem/onc -> no malignancy  He was an , retired last year  Recently diagnosed with SHA, wearing the CPAP now  Sees dermatology once a year, no significant rashes  Saw ophtho in 10/23, no retinal toxicity  The patient is doing well otherwise   Just a few minutes of morning stiffness  Compliant with meds  No side effects reported  No episodes of eye inflammation  No sicca sx  No chest pain, cough or dyspnea  No rashes  No infections    ROS:  As per HPI     Rheum hx (Recall from Dr. Craven's notes):  Saw Dr. Roldan 2014 (previously Dr. Servando Barrientos- RA diagnosis, + RF tried gold shots ineffective, MTX + HCQ since ~2004), then saw Dr. Barry.      In the morning, on waking up, gets pain in feet and stiffness in feet/ankles. AM stiffness in feet can last few  hours. Denies significant pain/swelling/stiffness in hand, wrists, elbow or shoulder currently. Sees Podiatry (hx of hammertoe involving all digits). No skin rashes. + dry mouth at night, no dry eyes, no hx of ocular inflammation. No hx of rheumatoid nodules. Has had paresthesia in feet, EMG/NCS done twice and states the tests were ok. No night sweats, unintentional weight loss, or lymph node swelling . No bowel issues, had a colonoscopy 2 months ago, no concerns     No significant fmhx   Wife recently had a hip surgery. 2 sons. 3 grandkids    Patient Active Problem List   Diagnosis    Abnormal CXR (chest x-ray)    Anxiety    BPH (benign prostatic hyperplasia)    Elevated PSA    GERD (gastroesophageal reflux disease)    Grade I hemorrhoids    Hyperlipidemia    Hypertension    Male erectile disorder    Multiple pulmonary nodules determined by computed tomography of lung    Rheumatoid arthritis (Multi)    Stage 3a chronic kidney disease (Multi)    Vitamin D deficiency    Shortness of breath    Other nonthrombocytopenic purpura (CMS-HCC)    Anemia    Obstructive sleep apnea    Muscle weakness (generalized)    Lung field abnormal    Long term (current) use of unspecified immunomodulators and immunosuppressants    Fatigue    Dyslipidemia    Difficulty in walking, not elsewhere classified    Disorder of iron metabolism    Alcohol dependence, in remission (Multi)    Acquired hammertoe of right foot    Acquired hallux malleus    Abnormal gait    Achilles tendinitis    Thrombocytopenia, unspecified (CMS-HCC)    Environmental and seasonal allergies    Allergic rhinitis    Chest pain    Elevated troponin      Past Medical History:   Diagnosis Date    Acute bronchitis 05/12/2023    Acute non-recurrent maxillary sinusitis 05/12/2023    Alcohol dependence (Multi) 05/12/2023    BRBPR (bright red blood per rectum) 05/12/2023    Fungal nail infection 05/12/2023    Right cervical radiculopathy 05/12/2023    Subacute cough 05/12/2023       Past Surgical History:   Procedure Laterality Date    CT GUIDED PERCUTANEOUS BIOPSY LUNG  2/10/2023    CT GUIDED PERCUTANEOUS BIOPSY LUNG 2/10/2023 [facility]CTOR [facility]      Social History     Socioeconomic History    Marital status:      Spouse name: Not on file    Number of children: Not on file    Years of education: Not on file    Highest education level: Not on file   Occupational History    Not on file   Tobacco Use    Smoking status: Former     Current packs/day: 0.00     Types: Cigarettes     Quit date: 1985     Years since quittin.7    Smokeless tobacco: Never   Substance and Sexual Activity    Alcohol use: Never    Drug use: Never    Sexual activity: Not on file   Other Topics Concern    Not on file   Social History Narrative    Not on file     Social Determinants of Health     Financial Resource Strain: Low Risk  (2023)    Overall Financial Resource Strain (CARDIA)     Difficulty of Paying Living Expenses: Not hard at all   Food Insecurity: No Food Insecurity (3/5/2024)    Hunger Vital Sign     Worried About Running Out of Food in the Last Year: Never true     Ran Out of Food in the Last Year: Never true   Transportation Needs: No Transportation Needs (2023)    PRAPARE - Transportation     Lack of Transportation (Medical): No     Lack of Transportation (Non-Medical): No   Physical Activity: Inactive (2023)    Exercise Vital Sign     Days of Exercise per Week: 0 days     Minutes of Exercise per Session: 0 min   Stress: No Stress Concern Present (2023)    [employer] - Occupational Stress Questionnaire     Feeling of Stress : Not at all   Social Connections: Moderately Integrated (2023)    Social Connection and Isolation Panel [NHANES]     Frequency of Communication with Friends and Family: Three times a week     Frequency of Social Gatherings with Friends and Family: Once a week     Attends [employer] Services: Never     Active Member of  Clubs or Organizations: Yes     Attends Club or Organization Meetings: More than 4 times per year     Marital Status:    Intimate Partner Violence: Not At Risk (8/14/2023)    Humiliation, Afraid, Rape, and Kick questionnaire     Fear of Current or Ex-Partner: No     Emotionally Abused: No     Physically Abused: No     Sexually Abused: No   Housing Stability: Unknown (8/14/2023)    Housing Stability Vital Sign     Unable to Pay for Housing in the Last Year: No     Number of Places Lived in the Last Year: Not on file     Unstable Housing in the Last Year: No      No Known Allergies     Current Outpatient Medications:     acetaminophen (Tylenol) 500 mg tablet, Take 2 tablets (1,000 mg) by mouth 2 times a day., Disp: , Rfl:     cetirizine (ZyrTEC) 10 mg tablet, Take 1 tablet (10 mg) by mouth once daily as needed for allergies., Disp: , Rfl:     citalopram (CeleXA) 20 mg tablet, TAKE 1 TABLET DAILY, Disp: 90 tablet, Rfl: 3    fexofenadine (Allegra) 180 mg tablet, Take 1 tablet (180 mg) by mouth once daily., Disp: , Rfl: 0    fluticasone (Flonase) 50 mcg/actuation nasal spray, Administer 1 spray into each nostril once daily. Shake gently. Before first use, prime pump. After use, clean tip and replace cap. (Patient taking differently: Administer 1 spray into each nostril once daily as needed for allergies. Shake gently. Before first use, prime pump. After use, clean tip and replace cap.), Disp: 16 g, Rfl: 2    hydroxychloroquine (Plaquenil) 200 mg tablet, TAKE 2 TABLETS ONCE DAILY (Patient taking differently: Take 2 tablets (400 mg) by mouth once daily.), Disp: 180 tablet, Rfl: 3    leflunomide (Arava) 20 mg tablet, TAKE 1 TABLET DAILY, Disp: 90 tablet, Rfl: 3    lidocaine (Lidoderm) 5 % patch, Place 1 patch over 12 hours on the skin once daily. Remove & discard patch within 12 hours or as directed by MD., Disp: 10 patch, Rfl: 0    losartan (Cozaar) 100 mg tablet, TAKE ONE-HALF (1/2) TABLET DAILY, Disp: 45 tablet,  Rfl: 3    lovastatin (Mevacor) 20 mg tablet, Take 1 tablet (20 mg) by mouth once daily., Disp: , Rfl:     omega-3 acid ethyl esters (Lovaza) 1 gram capsule, Take 1 capsule (1 g) by mouth once daily., Disp: , Rfl:     omeprazole (PriLOSEC) 10 mg DR capsule, TAKE 1 CAPSULE DAILY, Disp: 90 capsule, Rfl: 3    orphenadrine (Norflex) 100 mg 12 hr tablet, Take 1 tablet (100 mg) by mouth 2 times a day as needed for muscle spasms for up to 10 days. Do not crush, chew, or split., Disp: 20 tablet, Rfl: 0    predniSONE (Deltasone) 10 mg tablet, Take 1.5 tablets (15 mg) by mouth once daily for 5 days, THEN 1 tablet (10 mg) once daily for 5 days, THEN 0.5 tablets (5 mg) once daily for 5 days., Disp: 15 tablet, Rfl: 2    vit A/vit C/vit E/zinc/copper (PRESERVISION AREDS ORAL), Take 1 capsule by mouth once daily., Disp: , Rfl:      Objective   Visit Vitals  /75   Pulse 93   Temp 36.6 °C (97.9 °F)   Resp 20     Physical Exam:  General: AAOx3, Cooperative  Eyes: EOMI, conjunctiva clear, sclera white, anicteric  Throat/Mouth: No oral deformities, no cheek swelling, mucosa appear moist, no oral ulcers noted or loss of dentition   Skin: No rashes, ulcers or photosensitive areas  MSK: Upper Extremities:  Hand/Fingers: No erythema, edema, tenderness or warmth at DIP, PIP, or MCP joints, FROM grossly. Good hand . No nodules. No deformities   Wrists: No erythema, edema, warmth or tenderness at wrist, FROM grossly  Elbows: No tenderness, edema, erythema or warmth at elbows, FROM grossly. No nodules   Shoulders: No edema, erythema, tenderness or warmth at shoulders. FROM  Lower Extremities:   Hips: No obvious deformities. No joint tenderness, normal ROM grossly. No trochanteric bursae TTP  Knees: No tenderness, deformities, edema, rashes, or warmth, normal ROM grossly. No crepitus, no pes anserine bursa TTP   Ankles, feet: No deformities, tenderness, edema, erythema, ulceration, or warmth at the ankle or MTP/IP joints, normal ROM  grossly  Spine: No spinal tenderness to palpation. No SI joint tenderness    Lab Results   Component Value Date    WBC 7.5 09/13/2024    HGB 11.8 (L) 09/13/2024    HCT 36.1 (L) 09/13/2024    MCV 90 09/13/2024     09/13/2024        Chemistry    Lab Results   Component Value Date/Time     09/13/2024 1142    K 4.3 09/13/2024 1142     09/13/2024 1142    CO2 21 09/13/2024 1142    BUN 22 09/13/2024 1142    CREATININE 1.00 09/13/2024 1142    Lab Results   Component Value Date/Time    CALCIUM 8.9 09/13/2024 1142    ALKPHOS 52 09/13/2024 0010    AST 14 09/13/2024 0010    ALT 7 (L) 09/13/2024 0010    BILITOT 0.3 09/13/2024 0010         Lab Results   Component Value Date    CRP 1.07 (H) 05/10/2024      Lab Results   Component Value Date    SEDRATE 32 (H) 05/10/2024      Lab Results   Component Value Date    ALT 7 (L) 09/13/2024    AST 14 09/13/2024    ALKPHOS 52 09/13/2024    BILITOT 0.3 09/13/2024      === 02/10/23 ===  XR CHEST  No sizable pneumothorax status post recent biopsy.    CT chest 7/23:  Stable scattered pulmonary nodules measuring 1.7 cm or less in size.  No new suspicious pulmonary nodule or mass. Continued surveillance recommended as per Fleischner society guidelines.      Assessment/Plan    This is an 84 Y M with seropositive RA (, CCP 95), presenting for follow up  Last seen in 3/24    Labs:  9/24: Hb 11.8, rest of CBC, CMP wnl  5/24: Hb 11.8, Cr 1.26, CRP 1.07, ferritin 513, % sat 22, rest of CBC, CMP, ESR, iron studies, TSH, vitamin B12 and D wnl   2/24: Hb 12.1, rest of CBC  1/24: Hb 12.9, Cr 1.35, rest of CBC, CMP, UA, Uprt,   11/23: Hb 11.9, Cr 1.36, rest of CBC, CMP, ESR, CRP,   7/23: Hg improved, Cr 1.36  4/2023 ESR/CRP nml. ANCA with MPO/PR3 negative  3/21/23 Hg12. 4, GFR 57    CT chest 7/24:  1. Multiple similar sized pulmonary nodules demonstrate internal cavitation suggesting treatment response.  2. Severe coronary artery calcifications. Study is not optimized for evaluation  of coronary arteries.    CT chest abd pelvis 1/24:   1.  No CT evidence for acute aortic aneurysm or dissection  2. Mild plaque burden particularly involving distal abdominal aorta and the origin of bilateral renal arteries  3. Subcentimeter low-attenuation lesion involving dome of the liver which may represent a cyst but incompletely characterized in this exam  4. Left renal cyst  5. Prostatomegaly. Correlation with PSA levels recommended    CT chest 7/2023:   Stable scattered pulmonary nodules measuring 1.7 cm or less in size. No new suspicious pulmonary nodule or mass    PFTs 6/2023, no obstruction. DLCO nml     # Seropositive RA, CDAI: 4, has pain and swelling of the right wrist. Prednisone course for 15 days. If he continues to get flares, will add bDMARD with next time   # Lung nodule- sees Pulm/Onc. Biopsy shows organizing PNA. Given inflammatory arthritis is overall well controlled, less likely RA related and notable there has been some decrease in size without change in RA medications    - Labs with next radha (CBC, CMP, ESR, CRP, prebiologic work up)  - Continue Leflunomide 20mg daily  - Continue HCQ 400mg daily (eye exam UTD)  - Follow up with Pulm for cavitation/nodules  - Follow up with ophtho  - Vitamin D/ca . Denies hx of fragility fx  - Vaccine UTD including COVID 19, flu, PNA and shingles vaccinations (2 series) and RSV  - Consider DEXA with next radha     RTC in 6 months    Plan, including risks and benefits, was discussed with the patient, informed on how to reach us.     To schedule an appointment, call [phone]  To reach the rheumatology office, call [phone]    Trice De Los Santos MD   Division of Rheumatology  [facility]

## 2024-09-10 ENCOUNTER — APPOINTMENT (OUTPATIENT)
Dept: SLEEP MEDICINE | Facility: CLINIC | Age: 84
End: 2024-09-10
Payer: MEDICARE

## 2024-09-12 ENCOUNTER — APPOINTMENT (OUTPATIENT)
Dept: CARDIOLOGY | Facility: HOSPITAL | Age: 84
End: 2024-09-12
Payer: MEDICARE

## 2024-09-12 ENCOUNTER — HOSPITAL ENCOUNTER (OUTPATIENT)
Facility: HOSPITAL | Age: 84
Setting detail: OBSERVATION
Discharge: HOME | End: 2024-09-13
Attending: STUDENT IN AN ORGANIZED HEALTH CARE EDUCATION/TRAINING PROGRAM | Admitting: STUDENT IN AN ORGANIZED HEALTH CARE EDUCATION/TRAINING PROGRAM
Payer: MEDICARE

## 2024-09-12 DIAGNOSIS — R06.02 SHORTNESS OF BREATH: ICD-10-CM

## 2024-09-12 DIAGNOSIS — J30.89 ENVIRONMENTAL AND SEASONAL ALLERGIES: ICD-10-CM

## 2024-09-12 DIAGNOSIS — R07.9 ACUTE CHEST PAIN: Primary | ICD-10-CM

## 2024-09-12 DIAGNOSIS — E78.5 HYPERLIPIDEMIA, UNSPECIFIED HYPERLIPIDEMIA TYPE: ICD-10-CM

## 2024-09-12 DIAGNOSIS — R07.1 CHEST PAIN ON BREATHING: ICD-10-CM

## 2024-09-12 DIAGNOSIS — J30.9 ALLERGIC RHINITIS, UNSPECIFIED SEASONALITY, UNSPECIFIED TRIGGER: ICD-10-CM

## 2024-09-12 PROCEDURE — 99285 EMERGENCY DEPT VISIT HI MDM: CPT

## 2024-09-12 ASSESSMENT — LIFESTYLE VARIABLES
HAVE YOU EVER FELT YOU SHOULD CUT DOWN ON YOUR DRINKING: NO
TOTAL SCORE: 0
EVER FELT BAD OR GUILTY ABOUT YOUR DRINKING: NO
EVER HAD A DRINK FIRST THING IN THE MORNING TO STEADY YOUR NERVES TO GET RID OF A HANGOVER: NO
HAVE PEOPLE ANNOYED YOU BY CRITICIZING YOUR DRINKING: NO

## 2024-09-12 ASSESSMENT — PAIN - FUNCTIONAL ASSESSMENT: PAIN_FUNCTIONAL_ASSESSMENT: 0-10

## 2024-09-12 ASSESSMENT — PAIN SCALES - GENERAL: PAINLEVEL_OUTOF10: 4

## 2024-09-13 ENCOUNTER — APPOINTMENT (OUTPATIENT)
Dept: RADIOLOGY | Facility: HOSPITAL | Age: 84
End: 2024-09-13
Payer: MEDICARE

## 2024-09-13 ENCOUNTER — APPOINTMENT (OUTPATIENT)
Dept: CARDIOLOGY | Facility: HOSPITAL | Age: 84
End: 2024-09-13
Payer: MEDICARE

## 2024-09-13 VITALS
HEIGHT: 73 IN | TEMPERATURE: 97.2 F | RESPIRATION RATE: 18 BRPM | WEIGHT: 230 LBS | DIASTOLIC BLOOD PRESSURE: 65 MMHG | OXYGEN SATURATION: 98 % | HEART RATE: 65 BPM | BODY MASS INDEX: 30.48 KG/M2 | SYSTOLIC BLOOD PRESSURE: 118 MMHG

## 2024-09-13 PROBLEM — R07.9 CHEST PAIN: Status: ACTIVE | Noted: 2024-09-13

## 2024-09-13 PROBLEM — R79.89 ELEVATED TROPONIN: Status: ACTIVE | Noted: 2024-09-13

## 2024-09-13 LAB
ALBUMIN SERPL BCP-MCNC: 3.9 G/DL (ref 3.4–5)
ALP SERPL-CCNC: 52 U/L (ref 33–136)
ALT SERPL W P-5'-P-CCNC: 7 U/L (ref 10–52)
ANION GAP SERPL CALC-SCNC: 13 MMOL/L (ref 10–20)
ANION GAP SERPL CALC-SCNC: 15 MMOL/L (ref 10–20)
AORTIC VALVE MEAN GRADIENT: 4 MMHG
AST SERPL W P-5'-P-CCNC: 14 U/L (ref 9–39)
AVA (VTI): 2.09 CM2
BASOPHILS # BLD AUTO: 0.06 X10*3/UL (ref 0–0.1)
BASOPHILS NFR BLD AUTO: 0.7 %
BILIRUB SERPL-MCNC: 0.3 MG/DL (ref 0–1.2)
BNP SERPL-MCNC: 118 PG/ML (ref 0–99)
BUN SERPL-MCNC: 22 MG/DL (ref 6–23)
BUN SERPL-MCNC: 24 MG/DL (ref 6–23)
CALCIUM SERPL-MCNC: 8.8 MG/DL (ref 8.6–10.3)
CALCIUM SERPL-MCNC: 8.9 MG/DL (ref 8.6–10.3)
CARDIAC TROPONIN I PNL SERPL HS: 20 NG/L (ref 0–20)
CARDIAC TROPONIN I PNL SERPL HS: 23 NG/L (ref 0–20)
CARDIAC TROPONIN I PNL SERPL HS: 24 NG/L (ref 0–20)
CHLORIDE SERPL-SCNC: 106 MMOL/L (ref 98–107)
CHLORIDE SERPL-SCNC: 107 MMOL/L (ref 98–107)
CO2 SERPL-SCNC: 17 MMOL/L (ref 21–32)
CO2 SERPL-SCNC: 21 MMOL/L (ref 21–32)
CREAT SERPL-MCNC: 1 MG/DL (ref 0.5–1.3)
CREAT SERPL-MCNC: 1.1 MG/DL (ref 0.5–1.3)
EGFRCR SERPLBLD CKD-EPI 2021: 67 ML/MIN/1.73M*2
EGFRCR SERPLBLD CKD-EPI 2021: 75 ML/MIN/1.73M*2
EJECTION FRACTION APICAL 4 CHAMBER: 62.7
EJECTION FRACTION: 60 %
EOSINOPHIL # BLD AUTO: 0.28 X10*3/UL (ref 0–0.4)
EOSINOPHIL NFR BLD AUTO: 3.2 %
ERYTHROCYTE [DISTWIDTH] IN BLOOD BY AUTOMATED COUNT: 13.1 % (ref 11.5–14.5)
ERYTHROCYTE [DISTWIDTH] IN BLOOD BY AUTOMATED COUNT: 13.2 % (ref 11.5–14.5)
GLUCOSE SERPL-MCNC: 108 MG/DL (ref 74–99)
GLUCOSE SERPL-MCNC: 88 MG/DL (ref 74–99)
HCT VFR BLD AUTO: 36.1 % (ref 41–52)
HCT VFR BLD AUTO: 36.2 % (ref 41–52)
HGB BLD-MCNC: 11.6 G/DL (ref 13.5–17.5)
HGB BLD-MCNC: 11.8 G/DL (ref 13.5–17.5)
HOLD SPECIMEN: NORMAL
IMM GRANULOCYTES # BLD AUTO: 0.09 X10*3/UL (ref 0–0.5)
IMM GRANULOCYTES NFR BLD AUTO: 1 % (ref 0–0.9)
LEFT VENTRICLE INTERNAL DIMENSION DIASTOLE: 5.09 CM (ref 3.5–6)
LEFT VENTRICULAR OUTFLOW TRACT DIAMETER: 2 CM
LV EJECTION FRACTION BIPLANE: 61 %
LYMPHOCYTES # BLD AUTO: 1.38 X10*3/UL (ref 0.8–3)
LYMPHOCYTES NFR BLD AUTO: 16 %
MAGNESIUM SERPL-MCNC: 1.72 MG/DL (ref 1.6–2.4)
MCH RBC QN AUTO: 29.4 PG (ref 26–34)
MCH RBC QN AUTO: 29.7 PG (ref 26–34)
MCHC RBC AUTO-ENTMCNC: 32 G/DL (ref 32–36)
MCHC RBC AUTO-ENTMCNC: 32.7 G/DL (ref 32–36)
MCV RBC AUTO: 90 FL (ref 80–100)
MCV RBC AUTO: 93 FL (ref 80–100)
MITRAL VALVE E/A RATIO: 1.02
MONOCYTES # BLD AUTO: 0.95 X10*3/UL (ref 0.05–0.8)
MONOCYTES NFR BLD AUTO: 11 %
NEUTROPHILS # BLD AUTO: 5.86 X10*3/UL (ref 1.6–5.5)
NEUTROPHILS NFR BLD AUTO: 68.1 %
NRBC BLD-RTO: 0 /100 WBCS (ref 0–0)
NRBC BLD-RTO: 0 /100 WBCS (ref 0–0)
PLATELET # BLD AUTO: 218 X10*3/UL (ref 150–450)
PLATELET # BLD AUTO: 248 X10*3/UL (ref 150–450)
POTASSIUM SERPL-SCNC: 4.2 MMOL/L (ref 3.5–5.3)
POTASSIUM SERPL-SCNC: 4.3 MMOL/L (ref 3.5–5.3)
PROT SERPL-MCNC: 6.4 G/DL (ref 6.4–8.2)
RBC # BLD AUTO: 3.9 X10*6/UL (ref 4.5–5.9)
RBC # BLD AUTO: 4.02 X10*6/UL (ref 4.5–5.9)
RIGHT VENTRICLE FREE WALL PEAK S': 17.6 CM/S
RIGHT VENTRICLE PEAK SYSTOLIC PRESSURE: 34.4 MMHG
SODIUM SERPL-SCNC: 135 MMOL/L (ref 136–145)
SODIUM SERPL-SCNC: 136 MMOL/L (ref 136–145)
TRICUSPID ANNULAR PLANE SYSTOLIC EXCURSION: 2.3 CM
WBC # BLD AUTO: 7.5 X10*3/UL (ref 4.4–11.3)
WBC # BLD AUTO: 8.6 X10*3/UL (ref 4.4–11.3)

## 2024-09-13 PROCEDURE — 96374 THER/PROPH/DIAG INJ IV PUSH: CPT | Mod: 59

## 2024-09-13 PROCEDURE — 93306 TTE W/DOPPLER COMPLETE: CPT | Performed by: INTERNAL MEDICINE

## 2024-09-13 PROCEDURE — 93005 ELECTROCARDIOGRAM TRACING: CPT

## 2024-09-13 PROCEDURE — 2500000004 HC RX 250 GENERAL PHARMACY W/ HCPCS (ALT 636 FOR OP/ED): Performed by: PHYSICIAN ASSISTANT

## 2024-09-13 PROCEDURE — 84484 ASSAY OF TROPONIN QUANT: CPT | Performed by: PHYSICIAN ASSISTANT

## 2024-09-13 PROCEDURE — 83735 ASSAY OF MAGNESIUM: CPT | Performed by: PHYSICIAN ASSISTANT

## 2024-09-13 PROCEDURE — 36415 COLL VENOUS BLD VENIPUNCTURE: CPT | Performed by: PHYSICIAN ASSISTANT

## 2024-09-13 PROCEDURE — 99222 1ST HOSP IP/OBS MODERATE 55: CPT | Performed by: INTERNAL MEDICINE

## 2024-09-13 PROCEDURE — 71045 X-RAY EXAM CHEST 1 VIEW: CPT | Performed by: RADIOLOGY

## 2024-09-13 PROCEDURE — G0378 HOSPITAL OBSERVATION PER HR: HCPCS

## 2024-09-13 PROCEDURE — 82374 ASSAY BLOOD CARBON DIOXIDE: CPT | Performed by: HOSPITALIST

## 2024-09-13 PROCEDURE — 96375 TX/PRO/DX INJ NEW DRUG ADDON: CPT | Mod: 59

## 2024-09-13 PROCEDURE — 84484 ASSAY OF TROPONIN QUANT: CPT | Performed by: HOSPITALIST

## 2024-09-13 PROCEDURE — 96361 HYDRATE IV INFUSION ADD-ON: CPT

## 2024-09-13 PROCEDURE — 85025 COMPLETE CBC W/AUTO DIFF WBC: CPT | Performed by: PHYSICIAN ASSISTANT

## 2024-09-13 PROCEDURE — 71275 CT ANGIOGRAPHY CHEST: CPT

## 2024-09-13 PROCEDURE — 99223 1ST HOSP IP/OBS HIGH 75: CPT | Performed by: STUDENT IN AN ORGANIZED HEALTH CARE EDUCATION/TRAINING PROGRAM

## 2024-09-13 PROCEDURE — 80053 COMPREHEN METABOLIC PANEL: CPT | Performed by: PHYSICIAN ASSISTANT

## 2024-09-13 PROCEDURE — 83880 ASSAY OF NATRIURETIC PEPTIDE: CPT | Performed by: PHYSICIAN ASSISTANT

## 2024-09-13 PROCEDURE — 85027 COMPLETE CBC AUTOMATED: CPT | Performed by: HOSPITALIST

## 2024-09-13 PROCEDURE — 93306 TTE W/DOPPLER COMPLETE: CPT

## 2024-09-13 PROCEDURE — 2500000004 HC RX 250 GENERAL PHARMACY W/ HCPCS (ALT 636 FOR OP/ED): Performed by: STUDENT IN AN ORGANIZED HEALTH CARE EDUCATION/TRAINING PROGRAM

## 2024-09-13 PROCEDURE — 2550000001 HC RX 255 CONTRASTS: Performed by: STUDENT IN AN ORGANIZED HEALTH CARE EDUCATION/TRAINING PROGRAM

## 2024-09-13 PROCEDURE — 99238 HOSP IP/OBS DSCHRG MGMT 30/<: CPT | Performed by: HOSPITALIST

## 2024-09-13 PROCEDURE — 71275 CT ANGIOGRAPHY CHEST: CPT | Performed by: RADIOLOGY

## 2024-09-13 PROCEDURE — 74174 CTA ABD&PLVS W/CONTRAST: CPT | Performed by: RADIOLOGY

## 2024-09-13 PROCEDURE — 71045 X-RAY EXAM CHEST 1 VIEW: CPT

## 2024-09-13 RX ORDER — ENOXAPARIN SODIUM 100 MG/ML
40 INJECTION SUBCUTANEOUS EVERY 24 HOURS
Status: DISCONTINUED | OUTPATIENT
Start: 2024-09-13 | End: 2024-09-13 | Stop reason: HOSPADM

## 2024-09-13 RX ORDER — LOSARTAN POTASSIUM 25 MG/1
100 TABLET ORAL DAILY
Status: DISCONTINUED | OUTPATIENT
Start: 2024-09-13 | End: 2024-09-13 | Stop reason: HOSPADM

## 2024-09-13 RX ORDER — ONDANSETRON HYDROCHLORIDE 2 MG/ML
4 INJECTION, SOLUTION INTRAVENOUS ONCE
Status: COMPLETED | OUTPATIENT
Start: 2024-09-13 | End: 2024-09-13

## 2024-09-13 RX ORDER — ACETAMINOPHEN 160 MG/5ML
650 SOLUTION ORAL EVERY 4 HOURS PRN
Status: DISCONTINUED | OUTPATIENT
Start: 2024-09-13 | End: 2024-09-13 | Stop reason: HOSPADM

## 2024-09-13 RX ORDER — MAGNESIUM SULFATE HEPTAHYDRATE 40 MG/ML
2 INJECTION, SOLUTION INTRAVENOUS ONCE
Status: DISCONTINUED | OUTPATIENT
Start: 2024-09-13 | End: 2024-09-13 | Stop reason: HOSPADM

## 2024-09-13 RX ORDER — ONDANSETRON HYDROCHLORIDE 2 MG/ML
4 INJECTION, SOLUTION INTRAVENOUS EVERY 8 HOURS PRN
Status: DISCONTINUED | OUTPATIENT
Start: 2024-09-13 | End: 2024-09-13 | Stop reason: HOSPADM

## 2024-09-13 RX ORDER — OMEGA-3-ACID ETHYL ESTERS 1 G/1
1 CAPSULE, LIQUID FILLED ORAL DAILY
COMMUNITY

## 2024-09-13 RX ORDER — TALC
3 POWDER (GRAM) TOPICAL NIGHTLY PRN
Status: DISCONTINUED | OUTPATIENT
Start: 2024-09-13 | End: 2024-09-13 | Stop reason: HOSPADM

## 2024-09-13 RX ORDER — SODIUM CHLORIDE 9 MG/ML
125 INJECTION, SOLUTION INTRAVENOUS CONTINUOUS
Status: DISCONTINUED | OUTPATIENT
Start: 2024-09-13 | End: 2024-09-13

## 2024-09-13 RX ORDER — ACETAMINOPHEN 650 MG/1
650 SUPPOSITORY RECTAL EVERY 4 HOURS PRN
Status: DISCONTINUED | OUTPATIENT
Start: 2024-09-13 | End: 2024-09-13 | Stop reason: HOSPADM

## 2024-09-13 RX ORDER — METOPROLOL SUCCINATE 25 MG/1
25 TABLET, EXTENDED RELEASE ORAL DAILY
Status: DISCONTINUED | OUTPATIENT
Start: 2024-09-13 | End: 2024-09-13 | Stop reason: HOSPADM

## 2024-09-13 RX ORDER — ATORVASTATIN CALCIUM 20 MG/1
20 TABLET, FILM COATED ORAL NIGHTLY
Status: DISCONTINUED | OUTPATIENT
Start: 2024-09-13 | End: 2024-09-13 | Stop reason: HOSPADM

## 2024-09-13 RX ORDER — LOVASTATIN 20 MG/1
20 TABLET ORAL DAILY
Start: 2024-09-13

## 2024-09-13 RX ORDER — ACETAMINOPHEN 500 MG
2 TABLET ORAL 2 TIMES DAILY
COMMUNITY

## 2024-09-13 RX ORDER — ASPIRIN 81 MG/1
81 TABLET ORAL DAILY
Status: DISCONTINUED | OUTPATIENT
Start: 2024-09-13 | End: 2024-09-13 | Stop reason: HOSPADM

## 2024-09-13 RX ORDER — ONDANSETRON 4 MG/1
4 TABLET, FILM COATED ORAL EVERY 8 HOURS PRN
Status: DISCONTINUED | OUTPATIENT
Start: 2024-09-13 | End: 2024-09-13 | Stop reason: HOSPADM

## 2024-09-13 RX ORDER — NITROGLYCERIN 0.4 MG/1
0.4 TABLET SUBLINGUAL EVERY 5 MIN PRN
Status: DISCONTINUED | OUTPATIENT
Start: 2024-09-13 | End: 2024-09-13 | Stop reason: HOSPADM

## 2024-09-13 RX ORDER — MINERAL OIL
180 ENEMA (ML) RECTAL DAILY
Refills: 0
Start: 2024-09-13

## 2024-09-13 RX ORDER — ACETAMINOPHEN 325 MG/1
650 TABLET ORAL EVERY 4 HOURS PRN
Status: DISCONTINUED | OUTPATIENT
Start: 2024-09-13 | End: 2024-09-13 | Stop reason: HOSPADM

## 2024-09-13 RX ORDER — MORPHINE SULFATE 4 MG/ML
4 INJECTION, SOLUTION INTRAMUSCULAR; INTRAVENOUS ONCE
Status: COMPLETED | OUTPATIENT
Start: 2024-09-13 | End: 2024-09-13

## 2024-09-13 RX ORDER — POLYETHYLENE GLYCOL 3350 17 G/17G
17 POWDER, FOR SOLUTION ORAL DAILY
Status: DISCONTINUED | OUTPATIENT
Start: 2024-09-13 | End: 2024-09-13 | Stop reason: HOSPADM

## 2024-09-13 ASSESSMENT — PAIN - FUNCTIONAL ASSESSMENT
PAIN_FUNCTIONAL_ASSESSMENT: 0-10
PAIN_FUNCTIONAL_ASSESSMENT: 0-10

## 2024-09-13 ASSESSMENT — HEART SCORE
HISTORY: MODERATELY SUSPICIOUS
HEART SCORE: 6
TROPONIN: 1-3 TIMES NORMAL LIMIT
RISK FACTORS: >2 RISK FACTORS OR HX OF ATHEROSCLEROTIC DISEASE
ECG: NORMAL
AGE: 65+

## 2024-09-13 ASSESSMENT — PAIN SCALES - GENERAL
PAINLEVEL_OUTOF10: 2
PAINLEVEL_OUTOF10: 0 - NO PAIN
PAINLEVEL_OUTOF10: 0 - NO PAIN
PAINLEVEL_OUTOF10: 5 - MODERATE PAIN
PAINLEVEL_OUTOF10: 0 - NO PAIN
PAINLEVEL_OUTOF10: 0 - NO PAIN

## 2024-09-13 NOTE — H&P
Medical Group History and Physical      ASSESSMENT & PLAN:     #.  Chest pain  #.  Elevated troponin  -Negative nuclear stress test and echocardiogram in July 2023, CT angio negative for aneurysm or dissection  -EKG shows NSR with PVCs, no ischemic changes  -Telemetry  -Cardiology consult  -Pain control    #.  Multiple pulmonary nodules  -Increased in size from last imaging  -Previously biopsied and negative for malignancy, AFB, fungal elements, Gram stain negative  -Outpatient follow-up    VTE PPX: Lovenox      Mehdi Casanova MD    --Of note, this documentation is completed using the Dragon Dictation system (voice recognition software). There may be spelling and/or grammatical errors that were not corrected prior to final submission.--    HISTORY OF PRESENT ILLNESS:   Chief Complaint: Chest pain    Maurice Tripp is a 84 y.o. male presenting with chest pain.  Patient states he has had multiple episodes of chest pain over the last few weeks.  It is diffusely across his chest and does not radiate.  He states that his worse than chest pain he has had in the past.  Denies any shortness of breath or leg swelling.  His last cardiac workup was in July 2023 and included a negative nuclear stress test and echocardiogram.       ER Course: /68, HR 82, RR 17, T36.2.  Labs notable for elevated troponin of 24 with repeat decreasing to 23.  BNP elevated at 118.  CT angio negative for aneurysm or dissection and did show apparent growing irregular pulmonary nodules.    ROS  10 point review of systems negative except per HPI     PAST HISTORIES:     Past Medical History  He has a past medical history of Acute bronchitis (05/12/2023), Acute non-recurrent maxillary sinusitis (05/12/2023), Alcohol dependence (Multi) (05/12/2023), BRBPR (bright red blood per rectum) (05/12/2023), Fungal nail infection (05/12/2023), Right cervical radiculopathy (05/12/2023), and Subacute cough (05/12/2023).    Surgical History  He has a  past surgical history that includes CT guided percutaneous biopsy lung (2/10/2023).     Social History  He reports that he quit smoking about 39 years ago. His smoking use included cigarettes. He has never used smokeless tobacco. He reports that he does not drink alcohol and does not use drugs.    Family History  No family history on file.    Allergies:  Patient has no known allergies.      OBJECTIVE:      Last Recorded Vitals  /75   Pulse 72   Temp 36.2 °C (97.2 °F) (Temporal)   Resp 16   Wt 104 kg (230 lb)   SpO2 97%     Last I/O:  No intake/output data recorded.    Physical Exam   Gen: NAD, appears stated age  HEENT: EOM, MMM  CV: RRR, no murmurs rubs or gallops  Resp: Clear to auscultation bilaterally, normal effort  Abdomen: soft, NT,+BS  LE: No edema, no deformity  Neuro: A&Ox4, moving all extremities    LABS AND IMAGING:       Relevant Results  Labs Reviewed   CBC WITH AUTO DIFFERENTIAL - Abnormal       Result Value    WBC 8.6      nRBC 0.0      RBC 3.90 (*)     Hemoglobin 11.6 (*)     Hematocrit 36.2 (*)     MCV 93      MCH 29.7      MCHC 32.0      RDW 13.1      Platelets 218      Neutrophils % 68.1      Immature Granulocytes %, Automated 1.0 (*)     Lymphocytes % 16.0      Monocytes % 11.0      Eosinophils % 3.2      Basophils % 0.7      Neutrophils Absolute 5.86 (*)     Immature Granulocytes Absolute, Automated 0.09      Lymphocytes Absolute 1.38      Monocytes Absolute 0.95 (*)     Eosinophils Absolute 0.28      Basophils Absolute 0.06     COMPREHENSIVE METABOLIC PANEL - Abnormal    Glucose 108 (*)     Sodium 135 (*)     Potassium 4.2      Chloride 107      Bicarbonate 17 (*)     Anion Gap 15      Urea Nitrogen 24 (*)     Creatinine 1.10      eGFR 67      Calcium 8.8      Albumin 3.9      Alkaline Phosphatase 52      Total Protein 6.4      AST 14      Bilirubin, Total 0.3      ALT 7 (*)    B-TYPE NATRIURETIC PEPTIDE - Abnormal     (*)     Narrative:        <100 pg/mL - Heart failure  unlikely  100-299 pg/mL - Intermediate probability of acute heart                  failure exacerbation. Correlate with clinical                  context and patient history.    >=300 pg/mL - Heart Failure likely. Correlate with clinical                  context and patient history.    BNP testing is performed using different testing methodology at [facility] than at other [facility]. Direct result comparisons should only be made within the same method.      SERIAL TROPONIN-INITIAL - Abnormal    Troponin I, High Sensitivity 24 (*)     Narrative:     Less than 99th percentile of normal range cutoff-  Female and children under 18 years old <14 ng/L; Male <21 ng/L: Negative  Repeat testing should be performed if clinically indicated.     Female and children under 18 years old 14-50 ng/L; Male 21-50 ng/L:  Consistent with possible cardiac damage and possible increased clinical   risk. Serial measurements may help to assess extent of myocardial damage.     >50 ng/L: Consistent with cardiac damage, increased clinical risk and  myocardial infarction. Serial measurements may help assess extent of   myocardial damage.      NOTE: Children less than 1 year old may have higher baseline troponin   levels and results should be interpreted in conjunction with the overall   clinical context.     NOTE: Troponin I testing is performed using a different   testing methodology at [facility] than at other   [facility]. Direct result comparisons should only   be made within the same method.   SERIAL TROPONIN, 1 HOUR - Abnormal    Troponin I, High Sensitivity 23 (*)     Narrative:     Less than 99th percentile of normal range cutoff-  Female and children under 18 years old <14 ng/L; Male <21 ng/L: Negative  Repeat testing should be performed if clinically indicated.     Female and children under 18 years old 14-50 ng/L; Male 21-50 ng/L:  Consistent with possible cardiac damage and possible increased  clinical   risk. Serial measurements may help to assess extent of myocardial damage.     >50 ng/L: Consistent with cardiac damage, increased clinical risk and  myocardial infarction. Serial measurements may help assess extent of   myocardial damage.      NOTE: Children less than 1 year old may have higher baseline troponin   levels and results should be interpreted in conjunction with the overall   clinical context.     NOTE: Troponin I testing is performed using a different   testing methodology at [facility] than at other   [facility]. Direct result comparisons should only   be made within the same method.   MAGNESIUM - Normal    Magnesium 1.72     TROPONIN SERIES- (INITIAL, 1 HR)    Narrative:     The following orders were created for panel order Troponin I Series, High Sensitivity (0, 1 HR).  Procedure                               Abnormality         Status                     ---------                               -----------         ------                     Troponin I, High Sensiti...[[id number]]  Abnormal            Final result               Troponin, High Sensitivi...[[id number]]  Abnormal            Final result                 Please view results for these tests on the individual orders.     XR chest 1 view   Final Result   1.  Enlarging pulmonary arteries. . No acute process detected                  MACRO:   None        Signed by: Moises Patton 9/13/2024 2:35 AM   Dictation workstation:   [id number]      CT angio chest abdomen pelvis   Final Result   1. No thoracic or abdominal aortic aneurysm or dissection.   2. Severe coronary calcification   3. Apparent growing irregular pulmonary nodules which remain   concerning for malignancy continued surveillance advised.   4. Constipation. Mild gastric wall thickening   5. Enlarged prostate gland.        Signed by: Moises Patton 9/13/2024 2:35 AM   Dictation workstation:   [id number]

## 2024-09-13 NOTE — DISCHARGE INSTRUCTIONS
It was nice caring for you during your stay at [facility]. As we discussed,  -Please continue Aleve as needed.    Wishing you a healthy recovery!

## 2024-09-13 NOTE — CONSULTS
Inpatient consult to Cardiology  Consult performed by: BRETT Herman-CNP  Consult ordered by: Mehdi Casanova MD  Reason for consult: cp      Cardiology Consult Note      Date:   9/13/2024  Patient name:  Maurice Tripp  Date of admission:  9/12/2024 11:34 PM  MRN:   [id number]  YOB: 1940  Time of Consult:  11:24 AM    Consulting Cardiologist: BRETT Ta, CNP  Primary Cardiologist:   Dr. Balderas     Referring Provider: Dr Diez      Admission Diagnosis:     Chest pain      History of Present Illness:      Maurice Tripp is a 84 y.o.  male patient who is being at the request of Dr. Diez for inpatient consultation of angina. He was admitted on 9/12/2024.  Previous [facility] and [facility] records have been reviewed in detail.    Patient with a history of hypertension, dyslipidemia, pulmonary nodules  Patient states that he has been having chest pain on and off with deep breathing since January.  He states that back then he went into the hospital they thought it was more pleuritic gave him a muscle relaxer and helped him he went home.  He states then about a month ago he was taking a deep breath and he was having the chest pain with a deep breathing happen again 4 days ago and then again yesterday so he had his wife drive himself into the emergency department they kindly asked cardiology get involved with this case to see if this was cardiac related.  No fever, chills, nausea, vomiting, PND, orthopnea, claudications  Positive for chest pain with deep breathing  EKG is normal sinus rhythm with occasional PVC    Trop 24, 23  Magnesium 1.72    CT of the chest showed irregular pulmonary nodule    Cardiac history  7/13/23 stress  IMPRESSION:     1.  Normal myocardial perfusion scan.  2.  No evidence of ischemia.  3.  No evidence of infarction.  4.  Normal left ventricular ejection fraction of greater than 65%  without evidence of regional wall  motion abnormalities.    23 echo  CONCLUSIONS:  1. Left ventricular systolic function is normal with a 55-60% estimated ejection fraction.  2. Spectral Doppler shows an impaired relaxation pattern of left ventricular diastolic filling.  3. The estimated RVSP is 29 mm.  4. Mild aortic valve regurgitation.      Allergies:     No Known Allergies      Past Medical History:     Past Medical History:   Diagnosis Date    Acute bronchitis 2023    Acute non-recurrent maxillary sinusitis 2023    Alcohol dependence (Multi) 2023    BRBPR (bright red blood per rectum) 2023    Fungal nail infection 2023    Right cervical radiculopathy 2023    Subacute cough 2023       Past Surgical History:     Past Surgical History:   Procedure Laterality Date    CT GUIDED PERCUTANEOUS BIOPSY LUNG  2/10/2023    CT GUIDED PERCUTANEOUS BIOPSY LUNG 2/10/2023 [facility]       Family History:     No family history on file.    Social History:     Social History     Tobacco Use    Smoking status: Former     Current packs/day: 0.00     Types: Cigarettes     Quit date:      Years since quittin.7    Smokeless tobacco: Never   Substance Use Topics    Alcohol use: Never    Drug use: Never       CURRENT INPATIENT MEDICATIONS    aspirin, 81 mg, oral, Daily  atorvastatin, 20 mg, oral, Nightly  enoxaparin, 40 mg, subcutaneous, q24h  losartan, 100 mg, oral, Daily  metoprolol succinate XL, 25 mg, oral, Daily  polyethylene glycol, 17 g, oral, Daily         Current Outpatient Medications   Medication Instructions    acetaminophen (Tylenol) 500 mg tablet 2 tablets, oral, 2 times daily    cetirizine (ZyrTEC) 10 mg tablet 1 tablet, oral, Daily PRN    citalopram (CELEXA) 20 mg, oral, Daily    fexofenadine (ALLEGRA) 180 mg, oral, Daily    fluticasone (Flonase) 50 mcg/actuation nasal spray 1 spray, Each Nostril, Daily, Shake gently. Before first use, prime pump. After use, clean tip and replace cap.     hydroxychloroquine (Plaquenil) 200 mg tablet oral, Daily    leflunomide (ARAVA) 20 mg, oral, Daily    lidocaine (Lidoderm) 5 % patch 1 patch, transdermal, Daily, Remove & discard patch within 12 hours or as directed by MD.    losartan (Cozaar) 100 mg tablet oral, Daily    lovastatin (MEVACOR) 20 mg, oral, Daily, as directed    omega-3 acid ethyl esters (LOVAZA) 1 g, oral, Daily    omeprazole (PRILOSEC) 10 mg, oral, Daily    orphenadrine (NORFLEX) 100 mg, oral, 2 times daily PRN, Do not crush, chew, or split.    vit A/vit C/vit E/zinc/copper (PRESERVISION AREDS ORAL) 1 capsule, oral, Daily        Review of Systems:      12 point review of systems was obtained in detail and is negative other than that detailed above.    Vital Signs:     Vitals:    09/13/24 0729 09/13/24 0913 09/13/24 0951 09/13/24 1111   BP: 110/60 121/66 121/64    BP Location: Right arm Right arm     Patient Position: Sitting Lying     Pulse: 69 63 79 73   Resp: 20 18 18 18   Temp:       TempSrc:       SpO2: 95% 94% 98% 98%   Weight:       Height:           Intake/Output Summary (Last 24 hours) at 9/13/2024 1124  Last data filed at 9/13/2024 0252  Gross per 24 hour   Intake 312.5 ml   Output --   Net 312.5 ml       Wt Readings from Last 4 Encounters:   09/12/24 104 kg (230 lb)   05/30/24 109 kg (240 lb)   05/10/24 110 kg (242 lb)   04/23/24 109 kg (240 lb)       Physical Examination:     GENERAL APPEARANCE: Well developed, well nourished, in no acute distress.  CHEST: Symmetric and non-tender.  INTEGUMENT: Skin warm and dry, without gross excoriationis or lesions.  HEENT: No gross abnormalities of conjunctiva, teeth, gums, oral mucosa  NECK: Supple, no JVD, no bruit. Thyroid not palpable. Carotid upstrokes normal.  NEURO/PSHCY: Alert and oriented x3; appropriate behavior and responses and responses, grossly normal cerebellar function with normal balance and coordination  LUNGS: Clear to auscultation bilaterally; normal respiratory effort.  HEART:  Rate and rhythm regular with no evident murmur; no gallop appreciated. There are no rubs, clicks or heaves. PMI nondisplaced.  ABDOMEN: Soft, nontender, no palpable hepatosplenomegaly, no mases, no bruits. Abdominal aorta not noted to be enlarged.  MUSCULOSKELETAL: Ambulatory with normal tandem gait.  EXTREMITIES: Warm with good color, no clubbing or cyanois. There is no edema noted.  PERIPHERAL VASCULAR: Pulses present and equally palpable; 1+ throughout. No femoral bruits.      Lab:     CBC:   Results from last 7 days   Lab Units 09/13/24  0010   WBC AUTO x10*3/uL 8.6   RBC AUTO x10*6/uL 3.90*   HEMOGLOBIN g/dL 11.6*   HEMATOCRIT % 36.2*   MCV fL 93   MCH pg 29.7   MCHC g/dL 32.0   RDW % 13.1   PLATELETS AUTO x10*3/uL 218     CMP:    Results from last 7 days   Lab Units 09/13/24  0010   SODIUM mmol/L 135*   POTASSIUM mmol/L 4.2   CHLORIDE mmol/L 107   CO2 mmol/L 17*   BUN mg/dL 24*   CREATININE mg/dL 1.10   GLUCOSE mg/dL 108*   PROTEIN TOTAL g/dL 6.4   CALCIUM mg/dL 8.8   BILIRUBIN TOTAL mg/dL 0.3   ALK PHOS U/L 52   AST U/L 14   ALT U/L 7*     BMP:    Results from last 7 days   Lab Units 09/13/24  0010   SODIUM mmol/L 135*   POTASSIUM mmol/L 4.2   CHLORIDE mmol/L 107   CO2 mmol/L 17*   BUN mg/dL 24*   CREATININE mg/dL 1.10   CALCIUM mg/dL 8.8   GLUCOSE mg/dL 108*     Magnesium:  Results from last 7 days   Lab Units 09/13/24  0010   MAGNESIUM mg/dL 1.72     Troponin:    Results from last 7 days   Lab Units 09/13/24  0110 09/13/24  0010   TROPHS ng/L 23* 24*     BNP:   Results from last 7 days   Lab Units 09/13/24  0142   BNP pg/mL 118*     Lipid Panel:         Diagnostic Studies:   @No results found for this or any previous visit.    ECG 12 Lead    Result Date: 9/13/2024  Sinus rhythm with occasional Premature ventricular complexes Nonspecific T wave abnormality Abnormal ECG When compared with ECG of 21-JAN-2024 17:53, Premature ventricular complexes are now Present Borderline criteria for Inferior infarct are no  longer Present    XR chest 1 view    Result Date: 9/13/2024  Interpreted By:  Moises Patton, STUDY: XR CHEST 1 VIEW;  9/13/2024 1:27 am   INDICATION: Signs/Symptoms:Chest Pain.     COMPARISON: 02/06/20202024   ACCESSION NUMBER(S): [id number]   ORDERING CLINICIAN: GISELE DAUGHERTY   FINDINGS: Heart size is enlarged. Multiple pulmonary nodules again seen. Please see recent CT. No pneumothorax or pleural effusions. Heart size and mediastinum otherwise stable       1.  Enlarging pulmonary arteries. . No acute process detected       MACRO: None   Signed by: Moises Patton 9/13/2024 2:35 AM Dictation workstation:   [id number]    CT angio chest abdomen pelvis    Result Date: 9/13/2024  Interpreted By:  Moises Patton, STUDY: CT ANGIO CHEST ABDOMEN PELVIS;  9/13/2024 1:19 am   INDICATION: Signs/Symptoms:Severe midsternal chest pain.   COMPARISON: 01/21/2024 and recent chest CT   ACCESSION NUMBER(S): [id number]   ORDERING CLINICIAN: GISELE DAUGHERTY   TECHNIQUE: Axial non-contrast images of the chest, abdomen, and pelvis  with coronal and sagittal reformatted images. Axial CT images of the chest, abdomen and pelvis after the intravenous administration of of intravenous using CT angiographic technique with coronal and sagittal reformatted images.  MIP images were provided and reviewed. 3D reconstructions were performed on a separate independent workstation.   FINDINGS: VASCULAR: Robust coronary calcification. Stable ascending aorta 3.9 cm. No evidence of intramural hematoma. Contrast enhancement shows no dissection. No visualized pulmonary embolism.   Small sliding hiatal hernia. No pneumothorax. Trace left pleural effusion.   Multiple pulmonary nodules again detected which are somewhat irregular in appearance they appear to be slowly growing. Reference right apical nodule measuring 2 cm on image 39 previously measuring 1.8 cm. Reference right middle lobe nodule measuring 1.2 cm         Hepatic steatosis. No biliary  duct dilatation. No hydronephrosis. Unchanged renal cysts. No free air. No free fluid.   Equivocal gastric wall thickening which may reflect some gastritis. No diverticulitis. Trace diverticulosis. Constipation.   No pancreatitis. Stable spleen. No aneurysm.   Mildly enlarged prostate gland.   Moderate vascular calcification.   Mild multilevel degenerative disc disease seen throughout the thoracic and lumbar spine without evidence of acute osseous abnormality.       1. No thoracic or abdominal aortic aneurysm or dissection. 2. Severe coronary calcification 3. Apparent growing irregular pulmonary nodules which remain concerning for malignancy continued surveillance advised. 4. Constipation. Mild gastric wall thickening 5. Enlarged prostate gland.   Signed by: Moises Patton 9/13/2024 2:35 AM Dictation workstation:   [id number]      No echocardiogram results found for the past 14 days    Radiology:     XR chest 1 view   Final Result   1.  Enlarging pulmonary arteries. . No acute process detected                  MACRO:   None        Signed by: Moises Patton 9/13/2024 2:35 AM   Dictation workstation:   [id number]      CT angio chest abdomen pelvis   Final Result   1. No thoracic or abdominal aortic aneurysm or dissection.   2. Severe coronary calcification   3. Apparent growing irregular pulmonary nodules which remain   concerning for malignancy continued surveillance advised.   4. Constipation. Mild gastric wall thickening   5. Enlarged prostate gland.        Signed by: Moises Patton 9/13/2024 2:35 AM   Dictation workstation:   [id number]      Transthoracic Echo (TTE) Complete    (Results Pending)       Problem List:     Patient Active Problem List   Diagnosis    Abnormal CXR (chest x-ray)    Anxiety    BPH (benign prostatic hyperplasia)    Elevated PSA    GERD (gastroesophageal reflux disease)    Grade I hemorrhoids    Hyperlipidemia    Hypertension    Male erectile disorder    Multiple pulmonary  nodules determined by computed tomography of lung    Rheumatoid arthritis (Multi)    Stage 3a chronic kidney disease (Multi)    Vitamin D deficiency    Shortness of breath    Other nonthrombocytopenic purpura (CMS-HCC)    Anemia    Obstructive sleep apnea    Muscle weakness (generalized)    Lung field abnormal    Long term (current) use of unspecified immunomodulators and immunosuppressants    Fatigue    Dyslipidemia    Difficulty in walking, not elsewhere classified    Disorder of iron metabolism    Alcohol dependence, in remission (Multi)    Acquired hammertoe of right foot    Acquired hallux malleus    Abnormal gait    Achilles tendinitis    Thrombocytopenia, unspecified (CMS-HCC)    Environmental and seasonal allergies    Allergic rhinitis    Chest pain    Elevated troponin       Assessment:   Chest pain rule out ACS (pleuritic)  Hypertension  Dyslipidemia  Pulmonary nodules      Plan:   Tele monitoring  Serial enzymes  2d echo  Daily weights  Aspirin 81 daily  Toprol XL 25 mg daily  Lipitor 20 mg daily  Cozaar 100 mg daily  Stress test done 7/23 get it for reversible ischemia  Further recommendations per Dr. Chyna Dougherty CNP  [facility]    CARDIOLOGIST CONSULT NOTE  I have personally seen and examined patient as well as personally formulated treatment plan.  I have reviewed this note as created by BRETT Justice, CNP and agree with his findings and physical exam.    84-year-old man with no known coronary disease having had normal perfusion study just over a year ago and normal echocardiography at that time.  Over this last year and when he had those tests he has had pleuritic discomfort in his chest.  This can be exacerbated by picking up something heavy like a large stone out in his yard.  He is not exertional does not trouble him when he is walking.  He says that sometimes starts in his lower chest and every time he takes a deep breath it  seems to hurt more.  Also sometimes hard to blow out air such as when he is playing his saxophone but when he stops playing discomfort in the chest gradually resolves.  He has had no history myocardial infarction, stroke or symptoms of peripheral vascular disease.  His wife says he seems tired after these episodes occur    Exam: Generally appears quite comfortable not diaphoretic.  Lungs are clear to auscultation percussion cardiac exam shows a regular rhythm rate I do not appreciate cardiac or pulmonary rubs.  He has clear-cut costochondral discomfort and he says this is exactly what his discomfort feels like when he takes a deep breath    ECG: No acute changes  Troponin: Plateau low pattern not consistent with an acute coronary event    Impression  Pleuritic chest pain most consistent with costochondritis  Would suggest short course of nonsteroidal anti-inflammatory such as Aleve twice daily for 5 to 7 days  Still follow-up with cardiology but at this point I do not see benefit of hospitalization or further cardiac testing.  He does have coronary calcium on CT scanning but has had perfusion study within the last year which is unremarkable and current symptoms are not angina.  Continue be monitored carefully for blood pressure and cholesterol    Siddhartha Clemente MD        Of note, this documentation is completed using the Dragon Dictation system (voice recognition software). There may be spelling and/or grammatical errors that were not corrected prior to final submission.      Electronically signed by STEPHAN Herman, on 9/13/2024 at 11:24 AM

## 2024-09-13 NOTE — DISCHARGE SUMMARY
DISCHARGE DIAGNOSIS     Pleuritic chest pain most likely consistent with costochondritis  History of coronary artery disease    HOSPITAL COURSE AND DETAILS     Mr. Tripp is an 84-year-old female with past medical history of artery disease who presented to the emergency room with chest pain.    He states that he started having chest pain, particularly after he started playing his saxophone.  He did have mild troponin elevation, EKG on admission showed normal sinus rhythm.  He was seen by cardiology, who recommended over-the-counter relief.  Echocardiogram showed preserved EF with no structural abnormalities.    Patient was discharged home with outpatient follow-up.      * Some of these notes were completed using Dragon voice recognition technology and may include unintended areas with respect to translation of word, typographical errors or primary areas which may not have been identified prior to finalization of the document.        DISCHARGE PHYSICAL EXAM     Last Recorded Vitals:  Vitals:    09/13/24 0913 09/13/24 0951 09/13/24 1111 09/13/24 1416   BP: 121/66 121/64  118/65   BP Location: Right arm      Patient Position: Lying      Pulse: 63 79 73 65   Resp: 18 18 18 18   Temp:       TempSrc:       SpO2: 94% 98% 98% 98%   Weight:       Height:           Physical Exam  PHYSICAL EXAM:   GENERAL: Laying in bed, does not appear to be in any distress.   HEENT: HEAD: Normocephalic atraumatic.  Neck: Supple.  Eyes: Pupils are reactive to direct light.   CVS: S1, S2 heard. Regular rate and rhythm  LUNGS: Clear to auscultate bilaterally. No wheezing or rhonchi appreciated.  ABDOMEN: Soft, nontender to palpate. Positive bowel sounds. No guarding or rebound appreciated.  NEUROLOGICAL: No focal neurological deficits appreciated. Cranial nerves are grossly intact.  EXTREMITIES: No edema appreciated.  SKIN:  Grossly intact, warm and dry.    DISCHARGE MEDICATIONS        Your medication list        CHANGE how you take these  medications        Instructions Last Dose Given Next Dose Due   fexofenadine 180 mg tablet  Commonly known as: Allegra  What changed:   when to take this  reasons to take this      Take 1 tablet (180 mg) by mouth once daily.              CONTINUE taking these medications        Instructions Last Dose Given Next Dose Due   acetaminophen 500 mg tablet  Commonly known as: Tylenol           cetirizine 10 mg tablet  Commonly known as: ZyrTEC           citalopram 20 mg tablet  Commonly known as: CeleXA      TAKE 1 TABLET DAILY       fluticasone 50 mcg/actuation nasal spray  Commonly known as: Flonase      Administer 1 spray into each nostril once daily. Shake gently. Before first use, prime pump. After use, clean tip and replace cap.       hydroxychloroquine 200 mg tablet  Commonly known as: Plaquenil      TAKE 2 TABLETS ONCE DAILY       leflunomide 20 mg tablet  Commonly known as: Arava      TAKE 1 TABLET DAILY       lidocaine 5 % patch  Commonly known as: Lidoderm      Place 1 patch over 12 hours on the skin once daily. Remove & discard patch within 12 hours or as directed by MD.       losartan 100 mg tablet  Commonly known as: Cozaar      TAKE ONE-HALF (1/2) TABLET DAILY       lovastatin 20 mg tablet  Commonly known as: Mevacor      Take 1 tablet (20 mg) by mouth once daily.       omega-3 acid ethyl esters 1 gram capsule  Commonly known as: Lovaza           omeprazole 10 mg DR capsule  Commonly known as: PriLOSEC      TAKE 1 CAPSULE DAILY       orphenadrine 100 mg 12 hr tablet  Commonly known as: Norflex      Take 1 tablet (100 mg) by mouth 2 times a day as needed for muscle spasms for up to 10 days. Do not crush, chew, or split.       PRESERVISION AREDS ORAL                     Where to Get Your Medications        Information about where to get these medications is not yet available    Ask your nurse or doctor about these medications  fexofenadine 180 mg tablet  lovastatin 20 mg tablet           OUTPATIENT FOLLOW-UP      Future Appointments   Date Time Provider Department Center   9/24/2024  9:00 AM Trice De Los Santos MD [id number] [facility]   9/30/2024  1:45 PM Bull Balderas MD [id number] [facility]   10/18/2024  9:20 AM Toney Almeida DO [id number] [facility]   1/27/2025 10:00 AM Nicholas Houser MD [id number] [facility]

## 2024-09-13 NOTE — ED PROVIDER NOTES
"HPI   Chief Complaint   Patient presents with   • Pain With Breathing       This is a 84-year-old gentleman who presents emergency room with chief complaint of a sudden onset of \"severe\" midsternal chest pain that started around 1600 hrs. at home.  Patient states the pain was constant nothing made the pain worse and it did not relieve itself when he took a muscle relaxer.  The patient states that she has been having persistent dull midsternal chest discomfort which is worse with deep inspiration.  He denies any history of DVT or PE.  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  Denies any back pain, cough or hemoptysis.  Currently rates his chest pain a 5 out of 10.  He was brought to the hospital by his wife.  Denies any back pain, any leg pain or swelling.      History provided by:  Patient, spouse and medical records          Patient History   Past Medical History:   Diagnosis Date   • Acute bronchitis 2023   • Acute non-recurrent maxillary sinusitis 2023   • Alcohol dependence (Multi) 2023   • BRBPR (bright red blood per rectum) 2023   • Fungal nail infection 2023   • Right cervical radiculopathy 2023   • Subacute cough 2023     Past Surgical History:   Procedure Laterality Date   • CT GUIDED PERCUTANEOUS BIOPSY LUNG  2/10/2023    CT GUIDED PERCUTANEOUS BIOPSY LUNG 2/10/2023 [facility]     No family history on file.  Social History     Tobacco Use   • Smoking status: Former     Current packs/day: 0.00     Types: Cigarettes     Quit date: 1985     Years since quittin.7   • Smokeless tobacco: Never   Substance Use Topics   • Alcohol use: Never   • Drug use: Never       Physical Exam   ED Triage Vitals [24 2330]   Temperature Heart Rate Respirations BP   36.2 °C (97.2 °F) 82 17 137/68      Pulse Ox Temp Source Heart Rate Source Patient Position   95 % Temporal Monitor Sitting      BP " Location FiO2 (%)     Right arm --       Physical Exam  Vitals and nursing note reviewed.   Constitutional:       General: He is awake. He is not in acute distress.     Appearance: Normal appearance. He is well-developed and well-groomed. He is not ill-appearing, toxic-appearing or diaphoretic.   HENT:      Head: Normocephalic and atraumatic.      Right Ear: Tympanic membrane, ear canal and external ear normal.      Left Ear: Tympanic membrane, ear canal and external ear normal.      Nose: Nose normal.      Mouth/Throat:      Mouth: Mucous membranes are moist.      Pharynx: Oropharynx is clear.   Eyes:      Extraocular Movements: Extraocular movements intact.      Conjunctiva/sclera: Conjunctivae normal.      Pupils: Pupils are equal, round, and reactive to light.   Cardiovascular:      Rate and Rhythm: Normal rate and regular rhythm.      Pulses: Normal pulses.      Heart sounds: Normal heart sounds.   Pulmonary:      Effort: Pulmonary effort is normal.      Breath sounds: Normal breath sounds. No wheezing, rhonchi or rales.   Chest:      Chest wall: No tenderness.   Abdominal:      General: Abdomen is flat. Bowel sounds are normal.      Palpations: Abdomen is soft. There is no mass.      Tenderness: There is no abdominal tenderness. There is no guarding.   Musculoskeletal:         General: No swelling or tenderness. Normal range of motion.      Cervical back: Normal range of motion and neck supple.      Right lower leg: No edema.      Left lower leg: No edema.   Skin:     General: Skin is warm and dry.      Capillary Refill: Capillary refill takes less than 2 seconds.      Findings: No rash.   Neurological:      General: No focal deficit present.      Mental Status: He is alert and oriented to person, place, and time. Mental status is at baseline.   Psychiatric:         Mood and Affect: Mood normal.         Behavior: Behavior normal. Behavior is cooperative.         Thought Content: Thought content normal.          Judgment: Judgment normal.           ED Course & MDM   Diagnoses as of 09/13/24 0302   Acute chest pain                 No data recorded                                 Medical Decision Making  Temp 36 2 heart rate 82 respiration 17 blood pressure 137/68, pulse ox was 95% on room air  The patient was given normal saline infusion at 125 mL/h, morphine 4 mg IV push, Zofran 4 mg IV push.  EKG interpreted by me at 2330 hrs. sinus rhythm with occasional PVCs rate 82  QRS was 86  QTc was 334 no ischemic changes.  Review of the patient's EMR shows that he underwent a nuclear stress test back on July 13, 2023 which showed normal myocardial perfusion scan no evidence of ischemia or infarction with, normal left ventricular ejection fraction was greater than 65%.  He also underwent an echocardiogram on July 13, 2023 which showed LV systolic function was 55 6%, spectral Doppler shows impaired relaxation pattern of left ventricular diastolic filling, RVSP was 29 mm mild aortic valve regurgitation.  Lab results reviewed: CBC white count 8.6, hemoglobin 9.6 hematocrit 36.2, platelet count was 218.  General chemistry glucose 108 sodium 135, bicarb 17 BUN 24, normal GFR, ALT of 7, magnesium 1.72, , first troponin was 24, repeat was 23.        Procedure  Procedures     Mehdi Mcclelland PA-C  09/13/24 0302

## 2024-09-15 LAB
ATRIAL RATE: 82 BPM
P AXIS: 81 DEGREES
P OFFSET: 164 MS
P ONSET: 132 MS
PR INTERVAL: 174 MS
Q ONSET: 219 MS
QRS COUNT: 14 BEATS
QRS DURATION: 86 MS
QT INTERVAL: 286 MS
QTC CALCULATION(BAZETT): 334 MS
QTC FREDERICIA: 317 MS
R AXIS: 17 DEGREES
T AXIS: 71 DEGREES
T OFFSET: 362 MS
VENTRICULAR RATE: 82 BPM

## 2024-09-16 ENCOUNTER — PATIENT OUTREACH (OUTPATIENT)
Dept: PRIMARY CARE | Facility: CLINIC | Age: 84
End: 2024-09-16
Payer: MEDICARE

## 2024-09-16 ENCOUNTER — DOCUMENTATION (OUTPATIENT)
Dept: PRIMARY CARE | Facility: CLINIC | Age: 84
End: 2024-09-16
Payer: MEDICARE

## 2024-09-16 NOTE — PROGRESS NOTES
Discharge Facility: [facility]  Discharge Diagnosis: Acute chest pain  Admission Date: 9/12/2024  Discharge Date: 9/13/2024    PCP Appointment Date:  -Pt declines scheduling follow up and prefers to keep his next appt  -10/18/2024 0920    Specialist Appointment Date:   -9/24/2024 0900 rheumatology  -9/30/2024 1345 cardiology    Hospital Encounter and Summary Linked: Yes    See discharge assessment below for further details    Engagement  Call Start Time: 1042 (9/16/2024 10:45 AM)    Medications  Does the patient have all medications ordered at discharge?: Not applicable (9/16/2024 10:45 AM)  Care Management Interventions: No intervention needed (9/16/2024 10:45 AM)  Is the patient taking all medications as directed (includes completed medication regime)?: Yes (9/16/2024 10:45 AM)    Appointments  Does the patient have a primary care provider?: Yes (9/16/2024 10:45 AM)  Has the patient kept scheduled appointments due by today?: Yes (9/16/2024 10:45 AM)    Self Management  Has home health visited the patient within 72 hours of discharge?: Not applicable (9/16/2024 10:45 AM)    Patient Teaching  Does the patient have access to their discharge instructions?: Yes (9/16/2024 10:45 AM)  Care Management Interventions: Reviewed instructions with patient (9/16/2024 10:45 AM)  What is the patient's perception of their health status since discharge?: Improving (9/16/2024 10:45 AM)  Is the patient/caregiver able to teach back the hierarchy of who to call/visit for symptoms/problems? PCP, Specialist, Home Health nurse, Urgent Care, ED, 911: Yes (9/16/2024 10:45 AM)    Wrap Up  Wrap Up Additional Comments: Pt was admitted to [facility] 9/12-9/13/2024 for acute chest pain. Pt reports feeling much better since discharge. Pt discharged with no new medications and states he was instructed to take ibuprofen at home and he is doing well with that. Pt reports understanding of discharge instructions. Pt declines scheduling PCP  follow up and prefers to keep his next appt 10/18/2024 0920. Pt has follow up scheduled with rheumatology 9/24/2024 0900 and cardiology 9/30/2024 1345. Pt denies any questions, needs, or concerns at this time. Pt encouraged to call if questions or needs arise. (9/16/2024 10:45 AM)  Call End Time: 1047 (9/16/2024 10:45 AM)

## 2024-09-23 DIAGNOSIS — K21.9 GASTROESOPHAGEAL REFLUX DISEASE WITHOUT ESOPHAGITIS: ICD-10-CM

## 2024-09-23 RX ORDER — OMEPRAZOLE 10 MG/1
10 CAPSULE, DELAYED RELEASE ORAL DAILY
Qty: 90 CAPSULE | Refills: 3 | Status: SHIPPED | OUTPATIENT
Start: 2024-09-23

## 2024-09-24 ENCOUNTER — APPOINTMENT (OUTPATIENT)
Dept: RHEUMATOLOGY | Facility: CLINIC | Age: 84
End: 2024-09-24
Payer: MEDICARE

## 2024-09-24 VITALS
DIASTOLIC BLOOD PRESSURE: 75 MMHG | RESPIRATION RATE: 20 BRPM | BODY MASS INDEX: 30.08 KG/M2 | TEMPERATURE: 97.9 F | SYSTOLIC BLOOD PRESSURE: 129 MMHG | HEART RATE: 93 BPM | WEIGHT: 228 LBS

## 2024-09-24 DIAGNOSIS — M25.531 RIGHT WRIST PAIN: ICD-10-CM

## 2024-09-24 DIAGNOSIS — M06.9 RHEUMATOID ARTHRITIS INVOLVING MULTIPLE SITES, UNSPECIFIED WHETHER RHEUMATOID FACTOR PRESENT (MULTI): ICD-10-CM

## 2024-09-24 DIAGNOSIS — R91.8 MULTIPLE PULMONARY NODULES DETERMINED BY COMPUTED TOMOGRAPHY OF LUNG: ICD-10-CM

## 2024-09-24 DIAGNOSIS — M05.79 RHEUMATOID ARTHRITIS INVOLVING MULTIPLE SITES WITH POSITIVE RHEUMATOID FACTOR (MULTI): Primary | ICD-10-CM

## 2024-09-24 DIAGNOSIS — Z79.899 LONG-TERM USE OF PLAQUENIL: ICD-10-CM

## 2024-09-24 DIAGNOSIS — Z79.899 ON LONG TERM LEFLUNOMIDE THERAPY: ICD-10-CM

## 2024-09-24 DIAGNOSIS — E55.9 VITAMIN D DEFICIENCY: ICD-10-CM

## 2024-09-24 PROCEDURE — 99214 OFFICE O/P EST MOD 30 MIN: CPT | Performed by: STUDENT IN AN ORGANIZED HEALTH CARE EDUCATION/TRAINING PROGRAM

## 2024-09-24 PROCEDURE — 1160F RVW MEDS BY RX/DR IN RCRD: CPT | Performed by: STUDENT IN AN ORGANIZED HEALTH CARE EDUCATION/TRAINING PROGRAM

## 2024-09-24 PROCEDURE — 1036F TOBACCO NON-USER: CPT | Performed by: STUDENT IN AN ORGANIZED HEALTH CARE EDUCATION/TRAINING PROGRAM

## 2024-09-24 PROCEDURE — 1159F MED LIST DOCD IN RCRD: CPT | Performed by: STUDENT IN AN ORGANIZED HEALTH CARE EDUCATION/TRAINING PROGRAM

## 2024-09-24 PROCEDURE — 3078F DIAST BP <80 MM HG: CPT | Performed by: STUDENT IN AN ORGANIZED HEALTH CARE EDUCATION/TRAINING PROGRAM

## 2024-09-24 PROCEDURE — 1157F ADVNC CARE PLAN IN RCRD: CPT | Performed by: STUDENT IN AN ORGANIZED HEALTH CARE EDUCATION/TRAINING PROGRAM

## 2024-09-24 PROCEDURE — 3074F SYST BP LT 130 MM HG: CPT | Performed by: STUDENT IN AN ORGANIZED HEALTH CARE EDUCATION/TRAINING PROGRAM

## 2024-09-24 RX ORDER — PREDNISONE 10 MG/1
TABLET ORAL
Qty: 15 TABLET | Refills: 2 | Status: SHIPPED | OUTPATIENT
Start: 2024-09-24 | End: 2024-10-09

## 2024-09-30 ENCOUNTER — APPOINTMENT (OUTPATIENT)
Dept: CARDIOLOGY | Facility: CLINIC | Age: 84
End: 2024-09-30
Payer: MEDICARE

## 2024-09-30 VITALS
HEIGHT: 73 IN | DIASTOLIC BLOOD PRESSURE: 62 MMHG | BODY MASS INDEX: 31.14 KG/M2 | HEART RATE: 76 BPM | WEIGHT: 235 LBS | SYSTOLIC BLOOD PRESSURE: 120 MMHG

## 2024-09-30 DIAGNOSIS — E78.49 OTHER HYPERLIPIDEMIA: ICD-10-CM

## 2024-09-30 DIAGNOSIS — I25.10 CORONARY ARTERY CALCIFICATION: Primary | ICD-10-CM

## 2024-09-30 DIAGNOSIS — I10 PRIMARY HYPERTENSION: ICD-10-CM

## 2024-09-30 DIAGNOSIS — R07.89 ATYPICAL CHEST PAIN: ICD-10-CM

## 2024-09-30 PROCEDURE — 1157F ADVNC CARE PLAN IN RCRD: CPT | Performed by: INTERNAL MEDICINE

## 2024-09-30 PROCEDURE — 3074F SYST BP LT 130 MM HG: CPT | Performed by: INTERNAL MEDICINE

## 2024-09-30 PROCEDURE — 1159F MED LIST DOCD IN RCRD: CPT | Performed by: INTERNAL MEDICINE

## 2024-09-30 PROCEDURE — 1036F TOBACCO NON-USER: CPT | Performed by: INTERNAL MEDICINE

## 2024-09-30 PROCEDURE — 3078F DIAST BP <80 MM HG: CPT | Performed by: INTERNAL MEDICINE

## 2024-09-30 PROCEDURE — 99214 OFFICE O/P EST MOD 30 MIN: CPT | Performed by: INTERNAL MEDICINE

## 2024-09-30 NOTE — ASSESSMENT & PLAN NOTE
HTN:  BP is well controlled.   We discussed sodium restriction, lifestyle modification, and the DASH diet.  I advised the patient to check BPs at home daily, and to contact me with an update in one month.  Orders:    Follow Up In Cardiology; Future

## 2024-09-30 NOTE — PROGRESS NOTES
"Chief Complaint:   Please see below.     History Of Present Illness:    Maurice Tripp is a 84 y.o. male presenting with chest pain.    This 84-year-old hypertensive, hyperlipidemic man with a BMI of 31, and rheumatoid arthritis returns to the office in follow up after a visit to the emergency room and observation stay at [facility] due to atypical chest pain.  The patient's SPECT in 7/2023 disclosed negative for ischemia.  The patient's echocardiogram dated 9/13/2024  revealed an EF of 60%, without significant valvular or pericardial disease.  He went to the ER on 9/13/2024 with pleuritic chest pain.  He had a variety of teets done and was released with a diagnosis of atypical chest pain.    He was seen by Dr. Clemente.  His symptoms of pain occurred while he was playing the [employer].  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  Comment was made on a thoracic CT scan of coronary artery calcifications, not unusual for an 84-year-old man with his medical history.    He has a history of pulmonary nodules which have been biopsied for which he sees pulmonary, heme onc, and rheumatology.  He has seen Dr. Rubi Camarena and is being followed by serial CT scans, and PFTs    Since his discharge he denies any acute cardiac related issues or complaints, and has had no recurrence of chest pain, pleuritic or otherwise.       Last Recorded Vitals:  Vitals:    09/30/24 1300   BP: 120/62   BP Location: Left arm   Patient Position: Sitting   Pulse: 76   Weight: 107 kg (235 lb)   Height: 1.854 m (6' 1\")       Past Medical History:  He has a past medical history of Acute bronchitis (05/12/2023), Acute non-recurrent maxillary sinusitis (05/12/2023), Alcohol dependence (Multi) (05/12/2023), BRBPR (bright red blood per rectum) (05/12/2023), Fungal nail infection (05/12/2023), Right cervical radiculopathy (05/12/2023), and Subacute cough (05/12/2023).    Past Surgical History:  He has a past surgical " history that includes CT guided percutaneous biopsy lung (2/10/2023).      Social History:  He reports that he quit smoking about 39 years ago. His smoking use included cigarettes. He has never used smokeless tobacco. He reports that he does not currently use alcohol. He reports that he does not use drugs.    Family History:  Family History   Problem Relation Name Age of Onset    Aortic aneurysm Mother      Heart attack Father          passed away at age 52        Allergies:  Patient has no known allergies.    Outpatient Medications:  Current Outpatient Medications   Medication Instructions    acetaminophen (Tylenol) 500 mg tablet 2 tablets, oral, 2 times daily    cetirizine (ZyrTEC) 10 mg tablet 1 tablet, oral, Daily PRN    citalopram (CELEXA) 20 mg, oral, Daily    fexofenadine (ALLEGRA) 180 mg, oral, Daily    fluticasone (Flonase) 50 mcg/actuation nasal spray 1 spray, Each Nostril, Daily, Shake gently. Before first use, prime pump. After use, clean tip and replace cap.    hydroxychloroquine (Plaquenil) 200 mg tablet oral, Daily    leflunomide (ARAVA) 20 mg, oral, Daily    losartan (Cozaar) 100 mg tablet oral, Daily    lovastatin (MEVACOR) 20 mg, oral, Daily    omega-3 acid ethyl esters (LOVAZA) 1 g, oral, Daily    omeprazole (PRILOSEC) 10 mg, oral, Daily    orphenadrine (NORFLEX) 100 mg, oral, 2 times daily PRN, Do not crush, chew, or split.    predniSONE (Deltasone) 10 mg tablet Take 1.5 tablets (15 mg) by mouth once daily for 5 days, THEN 1 tablet (10 mg) once daily for 5 days, THEN 0.5 tablets (5 mg) once daily for 5 days.    vit A/vit C/vit E/zinc/copper (PRESERVISION AREDS ORAL) 1 capsule, oral, Daily       Physical Exam:  GENERAL:  pleasant 84 year-old  HEENT: No xanthelasma  NECK: Supple, no palpable adenopathy or thyromegaly  CHEST: Clear to auscultation, respiratory effort unlabored  CARDIAC: RRR, normal S1 and S2, no audible murmur, rub, gallop, carotids are brisk, PMI is not displaced  ABD: Active bowel  sounds, nontender, no organomegaly, no evidence of ascites  EXT: No clubbing, cyanosis, edema, or tenderness  NEURO: Awake, alert, appropriate, speech is fluent       Last Labs:  CBC -  Lab Results   Component Value Date    WBC 7.5 09/13/2024    HGB 11.8 (L) 09/13/2024    HCT 36.1 (L) 09/13/2024    MCV 90 09/13/2024     09/13/2024       CMP -  Lab Results   Component Value Date    CALCIUM 8.9 09/13/2024    PROT 6.4 09/13/2024    ALBUMIN 3.9 09/13/2024    AST 14 09/13/2024    ALT 7 (L) 09/13/2024    ALKPHOS 52 09/13/2024    BILITOT 0.3 09/13/2024       LIPID PANEL -   Lab Results   Component Value Date    CHOL 138 08/14/2023    TRIG 236 (H) 08/14/2023    HDL 36.3 (A) 08/14/2023    CHHDL 3.8 08/14/2023    LDLF 55 08/14/2023    VLDL 47 (H) 08/14/2023    NHDL 102 08/14/2023       RENAL FUNCTION PANEL -   Lab Results   Component Value Date    GLUCOSE 88 09/13/2024     09/13/2024    K 4.3 09/13/2024     09/13/2024    CO2 21 09/13/2024    ANIONGAP 13 09/13/2024    BUN 22 09/13/2024    CREATININE 1.00 09/13/2024    GFRMALE 52 (A) 08/01/2023    CALCIUM 8.9 09/13/2024    ALBUMIN 3.9 09/13/2024        Lab Results   Component Value Date     (H) 09/13/2024         Lab review: I have Chemistry CMP:   Lab Results   Component Value Date    ALBUMIN 3.9 09/13/2024    CALCIUM 8.9 09/13/2024    CO2 21 09/13/2024    CREATININE 1.00 09/13/2024    GLUCOSE 88 09/13/2024    BILITOT 0.3 09/13/2024    PROT 6.4 09/13/2024    ALT 7 (L) 09/13/2024    AST 14 09/13/2024    ALKPHOS 52 09/13/2024   , Chemistry BMP   Lab Results   Component Value Date    GLUCOSE 88 09/13/2024    CALCIUM 8.9 09/13/2024    CO2 21 09/13/2024    CREATININE 1.00 09/13/2024   , CBC:  Lab Results   Component Value Date    WBC 7.5 09/13/2024    RBC 4.02 (L) 09/13/2024    HGB 11.8 (L) 09/13/2024    HCT 36.1 (L) 09/13/2024    MCV 90 09/13/2024    MCH 29.4 09/13/2024    MCHC 32.7 09/13/2024    RDW 13.2 09/13/2024    NRBC 0.0 09/13/2024   , and Cardiac  "Enzymes: No results found for: \"CKTOTAL\", \"CKMB\", \"CKMBINDEX\", \"TROPONINT\"  Diagnostic review: I have independently interpreted the Echocardiogram .  My findings are as summarized in the report.    Assessment/Plan   Assessment & Plan  Coronary artery calcification  CAD: The patient has stable, functional class I CAD, and is doing well.  No additional testing is necessary at present. Important aspects of lifestyle modification were discussed in detail with the patient.  Recent labs and testing have been reviewed.    Orders:    Follow Up In Cardiology; Future    Atypical chest pain         Primary hypertension  HTN:  BP is well controlled.   We discussed sodium restriction, lifestyle modification, and the DASH diet.  I advised the patient to check BPs at home daily, and to contact me with an update in one month.  Orders:    Follow Up In Cardiology; Future    Other hyperlipidemia    Orders:    Follow Up In Cardiology; Future          Bull Balderas MD  "

## 2024-09-30 NOTE — PATIENT INSTRUCTIONS

## 2024-10-01 ENCOUNTER — PATIENT OUTREACH (OUTPATIENT)
Dept: PRIMARY CARE | Facility: CLINIC | Age: 84
End: 2024-10-01
Payer: MEDICARE

## 2024-10-01 NOTE — PROGRESS NOTES
Post hospital discharge follow up call complete.  At time of outreach call the patient feels as if their condition has improved since last visit.  Pt had appt with cardiologist 9/30 and rheumatology 9/24.  Pt reports he is doing good and has lost 8lbs. Pt denies any current needs from PCP.    -Verified next PCP appt 10/18/2024 0903.    Pt denies any questions, needs, or concerns. He is encouraged to call if questions or needs arise.

## 2024-10-18 ENCOUNTER — APPOINTMENT (OUTPATIENT)
Dept: PRIMARY CARE | Facility: CLINIC | Age: 84
End: 2024-10-18
Payer: MEDICARE

## 2024-10-18 VITALS
HEIGHT: 73 IN | OXYGEN SATURATION: 93 % | BODY MASS INDEX: 31.56 KG/M2 | RESPIRATION RATE: 16 BRPM | HEART RATE: 73 BPM | WEIGHT: 238.13 LBS | TEMPERATURE: 97.5 F | DIASTOLIC BLOOD PRESSURE: 60 MMHG | SYSTOLIC BLOOD PRESSURE: 116 MMHG

## 2024-10-18 DIAGNOSIS — R68.89 FORGETFULNESS: ICD-10-CM

## 2024-10-18 DIAGNOSIS — E78.5 HYPERLIPIDEMIA, UNSPECIFIED HYPERLIPIDEMIA TYPE: ICD-10-CM

## 2024-10-18 DIAGNOSIS — F10.21 ALCOHOL DEPENDENCE, IN REMISSION: ICD-10-CM

## 2024-10-18 DIAGNOSIS — Z00.00 ROUTINE GENERAL MEDICAL EXAMINATION AT HEALTH CARE FACILITY: ICD-10-CM

## 2024-10-18 DIAGNOSIS — M06.9 RHEUMATOID ARTHRITIS, INVOLVING UNSPECIFIED SITE, UNSPECIFIED WHETHER RHEUMATOID FACTOR PRESENT (MULTI): ICD-10-CM

## 2024-10-18 DIAGNOSIS — N18.31 STAGE 3A CHRONIC KIDNEY DISEASE (MULTI): Primary | ICD-10-CM

## 2024-10-18 DIAGNOSIS — D69.6 THROMBOCYTOPENIA, UNSPECIFIED (CMS-HCC): ICD-10-CM

## 2024-10-18 DIAGNOSIS — G62.0 DRUG-INDUCED POLYNEUROPATHY (MULTI): ICD-10-CM

## 2024-10-18 SDOH — HEALTH STABILITY: PHYSICAL HEALTH: ON AVERAGE, HOW MANY DAYS PER WEEK DO YOU ENGAGE IN MODERATE TO STRENUOUS EXERCISE (LIKE A BRISK WALK)?: 0 DAYS

## 2024-10-18 SDOH — ECONOMIC STABILITY: INCOME INSECURITY: IN THE LAST 12 MONTHS, WAS THERE A TIME WHEN YOU WERE NOT ABLE TO PAY THE MORTGAGE OR RENT ON TIME?: NO

## 2024-10-18 SDOH — HEALTH STABILITY: PHYSICAL HEALTH: ON AVERAGE, HOW MANY MINUTES DO YOU ENGAGE IN EXERCISE AT THIS LEVEL?: 0 MIN

## 2024-10-18 SDOH — ECONOMIC STABILITY: GENERAL
WHICH OF THE FOLLOWING DO YOU KNOW HOW TO USE AND HAVE ACCESS TO EVERY DAY? (CHOOSE ALL THAT APPLY): SMARTPHONE WITH CELLULAR DATA PLAN

## 2024-10-18 ASSESSMENT — ACTIVITIES OF DAILY LIVING (ADL)
BATHING: INDEPENDENT
MANAGING_FINANCES: INDEPENDENT
TAKING_MEDICATION: INDEPENDENT
GROCERY_SHOPPING: INDEPENDENT
DOING_HOUSEWORK: INDEPENDENT
DRESSING: INDEPENDENT

## 2024-10-18 ASSESSMENT — ENCOUNTER SYMPTOMS
DIZZINESS: 0
LOSS OF SENSATION IN FEET: 0
HEMATURIA: 0
DIARRHEA: 0
SHORTNESS OF BREATH: 0
LIGHT-HEADEDNESS: 0
APPETITE CHANGE: 0
ABDOMINAL PAIN: 0
CONSTIPATION: 0
SINUS PAIN: 0
OCCASIONAL FEELINGS OF UNSTEADINESS: 0
FLANK PAIN: 0
BLOOD IN STOOL: 0
MYALGIAS: 0
FEVER: 0
ARTHRALGIAS: 0
DEPRESSION: 0
PALPITATIONS: 0
NAUSEA: 0
VOMITING: 0
FATIGUE: 0
RHINORRHEA: 0

## 2024-10-18 ASSESSMENT — SOCIAL DETERMINANTS OF HEALTH (SDOH)
WITHIN THE LAST YEAR, HAVE TO BEEN RAPED OR FORCED TO HAVE ANY KIND OF SEXUAL ACTIVITY BY YOUR PARTNER OR EX-PARTNER?: NO
HOW OFTEN DO YOU GET TOGETHER WITH FRIENDS OR RELATIVES?: ONCE A WEEK
DO YOU BELONG TO ANY CLUBS OR ORGANIZATIONS SUCH AS CHURCH GROUPS UNIONS, FRATERNAL OR ATHLETIC GROUPS, OR SCHOOL GROUPS?: YES
HOW OFTEN DO YOU ATTEND CHURCH OR RELIGIOUS SERVICES?: NEVER
IN A TYPICAL WEEK, HOW MANY TIMES DO YOU TALK ON THE PHONE WITH FAMILY, FRIENDS, OR NEIGHBORS?: ONCE A WEEK
WITHIN THE LAST YEAR, HAVE YOU BEEN AFRAID OF YOUR PARTNER OR EX-PARTNER?: NO
HOW OFTEN DO YOU ATTENT MEETINGS OF THE CLUB OR ORGANIZATION YOU BELONG TO?: MORE THAN 4 TIMES PER YEAR
WITHIN THE LAST YEAR, HAVE YOU BEEN HUMILIATED OR EMOTIONALLY ABUSED IN OTHER WAYS BY YOUR PARTNER OR EX-PARTNER?: NO
WITHIN THE LAST YEAR, HAVE YOU BEEN KICKED, HIT, SLAPPED, OR OTHERWISE PHYSICALLY HURT BY YOUR PARTNER OR EX-PARTNER?: NO
HOW HARD IS IT FOR YOU TO PAY FOR THE VERY BASICS LIKE FOOD, HOUSING, MEDICAL CARE, AND HEATING?: NOT HARD AT ALL
IN THE PAST 12 MONTHS, HAS THE ELECTRIC, GAS, OIL, OR WATER COMPANY THREATENED TO SHUT OFF SERVICE IN YOUR HOME?: NO

## 2024-10-18 ASSESSMENT — PATIENT HEALTH QUESTIONNAIRE - PHQ9
2. FEELING DOWN, DEPRESSED OR HOPELESS: NOT AT ALL
SUM OF ALL RESPONSES TO PHQ9 QUESTIONS 1 AND 2: 0
1. LITTLE INTEREST OR PLEASURE IN DOING THINGS: NOT AT ALL

## 2024-10-18 NOTE — PROGRESS NOTES
Subjective   Maurice Tripp is a 84 y.o. male who is here for a routine exam.  Active Problem List      Comprehensive Medical/Surgical/Social/Family History  Past Medical History:   Diagnosis Date    Acute bronchitis 05/12/2023    Acute non-recurrent maxillary sinusitis 05/12/2023    Alcohol dependence 05/12/2023    BRBPR (bright red blood per rectum) 05/12/2023    Fungal nail infection 05/12/2023    Right cervical radiculopathy 05/12/2023    Subacute cough 05/12/2023     Past Surgical History:   Procedure Laterality Date    CT GUIDED PERCUTANEOUS BIOPSY LUNG  2/10/2023    CT GUIDED PERCUTANEOUS BIOPSY LUNG 2/10/2023 DOCTOR OFFICE [facility]GAC[facility]     Social History     Social History Narrative    Not on file       Allergies and Medications  Patient has no known allergies.  Current Outpatient Medications on File Prior to Visit   Medication Sig Dispense Refill    acetaminophen (Tylenol) 500 mg tablet Take 2 tablets (1,000 mg) by mouth 2 times a day.      cetirizine (ZyrTEC) 10 mg tablet Take 1 tablet (10 mg) by mouth once daily as needed for allergies.      citalopram (CeleXA) 20 mg tablet TAKE 1 TABLET DAILY 90 tablet 3    fexofenadine (Allegra) 180 mg tablet Take 1 tablet (180 mg) by mouth once daily.  0    fluticasone (Flonase) 50 mcg/actuation nasal spray Administer 1 spray into each nostril once daily. Shake gently. Before first use, prime pump. After use, clean tip and replace cap. 16 g 2    hydroxychloroquine (Plaquenil) 200 mg tablet TAKE 2 TABLETS ONCE DAILY 180 tablet 3    leflunomide (Arava) 20 mg tablet TAKE 1 TABLET DAILY 90 tablet 3    losartan (Cozaar) 100 mg tablet TAKE ONE-HALF (1/2) TABLET DAILY 45 tablet 3    lovastatin (Mevacor) 20 mg tablet Take 1 tablet (20 mg) by mouth once daily.      omega-3 acid ethyl esters (Lovaza) 1 gram capsule Take 1 capsule (1 g) by mouth once daily.      omeprazole (PriLOSEC) 10 mg DR capsule TAKE 1 CAPSULE DAILY 90 capsule 3    orphenadrine (Norflex) 100 mg 12 hr tablet Take 1  tablet (100 mg) by mouth 2 times a day as needed for muscle spasms for up to 10 days. Do not crush, chew, or split. 20 tablet 0    vit A/vit C/vit E/zinc/copper (PRESERVISION AREDS ORAL) Take 1 capsule by mouth once daily.       No current facility-administered medications on file prior to visit.       New Concerns:  Joint pain - tylenol regularly.  Does have rheumatoid arthritis, does follow with rheumatology.    Patient is noticing significant forgetfulness, worse when discussing recent activities of happen, his wife is noticing his issues with these.    No difficulties with activity, is still playing musical instruments locally and regularly.    He is physically active around the house, him and his wife recently moved.        Lifestyle  Diet:  Healthy and Well Balanced  Physical Activity: Inactive (10/18/2024)    Exercise Vital Sign     Days of Exercise per Week: 0 days     Minutes of Exercise per Session: 0 min      Tobacco Use: Medium Risk (10/18/2024)    Patient History     Smoking Tobacco Use: Former     Smokeless Tobacco Use: Never     Passive Exposure: Not on file      Alcohol Use: Not At Risk (3/5/2024)    AUDIT-C     Frequency of Alcohol Consumption: Never     Average Number of Drinks: Patient does not drink     Frequency of Binge Drinking: Never      Depression: Not at risk (10/18/2024)    PHQ-2     PHQ-2 Score: 0      Stress: No Stress Concern Present (10/18/2024)    [facility] - Occupational Stress Questionnaire     Feeling of Stress : Not at all       Consultants:  Patient Care Team:  Toney Almeida DO as PCP - General  Toney Almeida DO as PCP - O Medicare Advantage PCP  Basia Laboy MD as Consulting Physician (Hematology and Oncology)  Maya Cuello RN as Care Manager (Case Management)  Bull Balderas MD as Consulting Physician (Cardiology)   Filiberto Rheum    Colorectal Screening:   colonoscopy  Date: 2022        Review of Systems   Constitutional:  Negative for  "appetite change, fatigue and fever.   HENT:  Negative for ear discharge, ear pain, hearing loss, postnasal drip, rhinorrhea and sinus pain.    Eyes:  Negative for visual disturbance.   Respiratory:  Negative for shortness of breath.    Cardiovascular:  Negative for chest pain, palpitations and leg swelling.   Gastrointestinal:  Negative for abdominal pain, blood in stool, constipation, diarrhea, nausea and vomiting.   Genitourinary:  Negative for flank pain and hematuria.   Musculoskeletal:  Negative for arthralgias and myalgias.   Skin:  Negative for rash.   Neurological:  Negative for dizziness and light-headedness.   All other systems reviewed and are negative.      Objective   /60 (BP Location: Right arm, Patient Position: Sitting)   Pulse 73   Temp 36.4 °C (97.5 °F) (Temporal)   Resp 16   Ht 1.854 m (6' 1\")   Wt 108 kg (238 lb 2 oz)   SpO2 93%   BMI 31.42 kg/m²     Physical Exam  Vitals reviewed.   Constitutional:       General: He is not in acute distress.     Appearance: Normal appearance. He is normal weight. He is not toxic-appearing.   HENT:      Head: Normocephalic and atraumatic.      Right Ear: Tympanic membrane and ear canal normal.      Left Ear: Tympanic membrane and ear canal normal.      Nose: Nose normal. No congestion or rhinorrhea.      Mouth/Throat:      Mouth: Mucous membranes are dry.   Eyes:      General: No scleral icterus.     Extraocular Movements: Extraocular movements intact.      Conjunctiva/sclera: Conjunctivae normal.      Pupils: Pupils are equal, round, and reactive to light.   Cardiovascular:      Rate and Rhythm: Normal rate and regular rhythm.      Heart sounds: No murmur heard.     No friction rub. No gallop.   Pulmonary:      Effort: Pulmonary effort is normal. No respiratory distress.      Breath sounds: Normal breath sounds. No wheezing, rhonchi or rales.   Abdominal:      General: Abdomen is flat. There is no distension.      Palpations: Abdomen is soft.      " Tenderness: There is no abdominal tenderness. There is no guarding.   Musculoskeletal:         General: Normal range of motion.      Cervical back: Normal range of motion and neck supple.      Right lower leg: No edema.      Left lower leg: No edema.   Lymphadenopathy:      Cervical: No cervical adenopathy.   Skin:     General: Skin is warm and dry.   Neurological:      General: No focal deficit present.      Mental Status: He is alert and oriented to person, place, and time.   Psychiatric:         Mood and Affect: Mood normal.         Behavior: Behavior normal.         Assessment/Plan   Problem List Items Addressed This Visit       Hyperlipidemia    Relevant Orders    Comprehensive Metabolic Panel    CBC    Lipid Panel    TSH with reflex to Free T4 if abnormal    Rheumatoid arthritis    Relevant Orders    Comprehensive Metabolic Panel    CBC    Lipid Panel    TSH with reflex to Free T4 if abnormal    Stage 3a chronic kidney disease (Multi) - Primary    Relevant Orders    Comprehensive Metabolic Panel    CBC    Lipid Panel    TSH with reflex to Free T4 if abnormal    Alcohol dependence, in remission    Thrombocytopenia, unspecified (CMS-HCC)     Other Visit Diagnoses       Drug-induced polyneuropathy (Multi)        Forgetfulness        Relevant Orders    MR brain wo IV contrast            Reviewed Social Determinants of health with patient, discussed healthy lifestyle including 150 minutes of physical activity per week  Ordered/Reviewed baseline labwork -CBC, CMP, Lipid Panel  Immunizations: Up To Date    Colorectal Screen 22    For concern for forgetfulness we will evaluate for dementia with lab lab work, MRI.  We will follow-up with results, we will have patient continue follow-up with neurology as needed.        Health Counseling    Cardiac Risk Assessment  15 - 20 minutes were spent discussing Cardiovascular risk and, if needed, lifestyle modifications were recommended, including nutritional choices, exercise,  and elimination of habits contributing to risk.   Aspirin use/disuse was discussed following the guidelines below:  low dose ASA ( mg) should be considered:    If prior Heart Attack/Stroke/Peripheral vascular disease:  Generally recommend daily low dose aspirin unless extremely high bleeding risk (e.g., gastrointestinal).    If no prior Heart Attack/Stroke/Peripheral vascular disease:              Age over 70: Do not use Aspirin for prevention    Age less than 70 and 10-year cardiovascular disease risk is >20%: use low dose Aspirin for prevention.

## 2024-11-01 DIAGNOSIS — M62.838 MUSCLE SPASM: ICD-10-CM

## 2024-11-01 RX ORDER — ORPHENADRINE CITRATE 100 MG/1
100 TABLET, EXTENDED RELEASE ORAL 2 TIMES DAILY PRN
Qty: 20 TABLET | Refills: 0 | Status: SHIPPED | OUTPATIENT
Start: 2024-11-01 | End: 2024-11-01 | Stop reason: SDUPTHER

## 2024-11-01 RX ORDER — ORPHENADRINE CITRATE 100 MG/1
100 TABLET, EXTENDED RELEASE ORAL 2 TIMES DAILY PRN
Qty: 20 TABLET | Refills: 0 | Status: SHIPPED | OUTPATIENT
Start: 2024-11-01 | End: 2024-11-11

## 2024-11-04 ENCOUNTER — HOSPITAL ENCOUNTER (OUTPATIENT)
Dept: RADIOLOGY | Facility: HOSPITAL | Age: 84
Discharge: HOME | End: 2024-11-04
Payer: MEDICARE

## 2024-11-04 DIAGNOSIS — R68.89 FORGETFULNESS: ICD-10-CM

## 2024-11-04 PROCEDURE — 70551 MRI BRAIN STEM W/O DYE: CPT | Performed by: RADIOLOGY

## 2024-11-04 PROCEDURE — 70551 MRI BRAIN STEM W/O DYE: CPT

## 2024-11-05 ENCOUNTER — PATIENT OUTREACH (OUTPATIENT)
Dept: PRIMARY CARE | Facility: CLINIC | Age: 84
End: 2024-11-05
Payer: MEDICARE

## 2024-12-02 DIAGNOSIS — M06.9 RHEUMATOID ARTHRITIS, INVOLVING UNSPECIFIED SITE, UNSPECIFIED WHETHER RHEUMATOID FACTOR PRESENT (MULTI): Primary | ICD-10-CM

## 2024-12-03 ENCOUNTER — PATIENT OUTREACH (OUTPATIENT)
Dept: PRIMARY CARE | Facility: CLINIC | Age: 84
End: 2024-12-03
Payer: MEDICARE

## 2024-12-09 ENCOUNTER — APPOINTMENT (OUTPATIENT)
Dept: PHARMACY | Facility: HOSPITAL | Age: 84
End: 2024-12-09
Payer: MEDICARE

## 2024-12-09 DIAGNOSIS — M62.838 MUSCLE SPASM: ICD-10-CM

## 2024-12-09 DIAGNOSIS — M06.9 RHEUMATOID ARTHRITIS INVOLVING MULTIPLE SITES, UNSPECIFIED WHETHER RHEUMATOID FACTOR PRESENT (MULTI): ICD-10-CM

## 2024-12-09 DIAGNOSIS — M06.9 RHEUMATOID ARTHRITIS, INVOLVING UNSPECIFIED SITE, UNSPECIFIED WHETHER RHEUMATOID FACTOR PRESENT (MULTI): ICD-10-CM

## 2024-12-09 PROCEDURE — RXMED WILLOW AMBULATORY MEDICATION CHARGE

## 2024-12-09 RX ORDER — ORPHENADRINE CITRATE 100 MG/1
100 TABLET, EXTENDED RELEASE ORAL 2 TIMES DAILY PRN
Qty: 20 TABLET | Refills: 0 | Status: SHIPPED | OUTPATIENT
Start: 2024-12-09 | End: 2024-12-23

## 2024-12-09 RX ORDER — LEFLUNOMIDE 20 MG/1
20 TABLET ORAL DAILY
Qty: 90 TABLET | Refills: 3 | Status: SHIPPED | OUTPATIENT
Start: 2024-12-09

## 2024-12-09 NOTE — PROGRESS NOTES
"Subjective   Patient ID: Maurice Tripp is a 84 y.o. male who presents for No chief complaint on file.. Cost assistance for leflunomide and orphenadrine. Not on PAP formulary; ran test claims to check price of ins vs. Cash below:     Patient taking leflunomide for RA and orphenadrine for muscle spasms in chest.     For 90-day supply - 90 tabs:     For 10 days supply - 20 tabs    Assessment/Plan   Problem List Items Addressed This Visit       Rheumatoid arthritis       LAB RESULTS:  No results found for: \"HGBA1C\"  Lab Results   Component Value Date    CREATININE 1.00 09/13/2024      Lab Results   Component Value Date    CHOL 138 08/14/2023    CHOL 149 07/20/2022    CHOL 152 08/03/2021     Lab Results   Component Value Date    HDL 36.3 (A) 08/14/2023    HDL 44.0 07/20/2022    HDL 39.0 (A) 08/03/2021       No results found for: \"LDLCALC\"  Lab Results   Component Value Date    TRIG 236 (H) 08/14/2023    TRIG 142 07/20/2022    TRIG 148 08/03/2021       Lab Results   Component Value Date    PCBHWMPT02 441 05/10/2024       Continue all meds under the continuation of care with the referring provider and clinical pharmacy team.    Patient plans to pay cash for leflunomide and use ins for orphenadrine. Rx transferred/re-written to  [facility] for patient pick-up. Patient has pharmd contact for future needs and will not schedule follow-up at this time as patient's medication cost problem is resolved    "

## 2024-12-13 ENCOUNTER — PHARMACY VISIT (OUTPATIENT)
Dept: PHARMACY | Facility: CLINIC | Age: 84
End: 2024-12-13
Payer: COMMERCIAL

## 2024-12-23 DIAGNOSIS — F41.9 ANXIETY: ICD-10-CM

## 2024-12-23 RX ORDER — CITALOPRAM 20 MG/1
20 TABLET, FILM COATED ORAL DAILY
Qty: 90 TABLET | Refills: 3 | Status: SHIPPED | OUTPATIENT
Start: 2024-12-23

## 2024-12-30 ENCOUNTER — LAB (OUTPATIENT)
Dept: LAB | Facility: LAB | Age: 84
End: 2024-12-30
Payer: MEDICARE

## 2024-12-30 DIAGNOSIS — M06.9 RHEUMATOID ARTHRITIS, INVOLVING UNSPECIFIED SITE, UNSPECIFIED WHETHER RHEUMATOID FACTOR PRESENT (MULTI): ICD-10-CM

## 2024-12-30 DIAGNOSIS — E55.9 VITAMIN D DEFICIENCY: ICD-10-CM

## 2024-12-30 DIAGNOSIS — N18.31 STAGE 3A CHRONIC KIDNEY DISEASE (MULTI): ICD-10-CM

## 2024-12-30 DIAGNOSIS — M79.605 PAIN IN BOTH LOWER EXTREMITIES: ICD-10-CM

## 2024-12-30 DIAGNOSIS — E78.5 HYPERLIPIDEMIA, UNSPECIFIED HYPERLIPIDEMIA TYPE: ICD-10-CM

## 2024-12-30 DIAGNOSIS — M05.79 RHEUMATOID ARTHRITIS INVOLVING MULTIPLE SITES WITH POSITIVE RHEUMATOID FACTOR (MULTI): ICD-10-CM

## 2024-12-30 DIAGNOSIS — R91.8 MULTIPLE PULMONARY NODULES DETERMINED BY COMPUTED TOMOGRAPHY OF LUNG: ICD-10-CM

## 2024-12-30 DIAGNOSIS — Z79.60 LONG-TERM USE OF IMMUNOSUPPRESSANT MEDICATION: ICD-10-CM

## 2024-12-30 DIAGNOSIS — M79.604 PAIN IN BOTH LOWER EXTREMITIES: ICD-10-CM

## 2024-12-30 LAB
ALBUMIN SERPL BCP-MCNC: 3.8 G/DL (ref 3.4–5)
ALP SERPL-CCNC: 47 U/L (ref 33–136)
ALT SERPL W P-5'-P-CCNC: 11 U/L (ref 10–52)
ANION GAP SERPL CALC-SCNC: 14 MMOL/L (ref 10–20)
AST SERPL W P-5'-P-CCNC: 13 U/L (ref 9–39)
BASOPHILS # BLD AUTO: 0.08 X10*3/UL (ref 0–0.1)
BASOPHILS NFR BLD AUTO: 0.9 %
BILIRUB SERPL-MCNC: 0.4 MG/DL (ref 0–1.2)
BUN SERPL-MCNC: 21 MG/DL (ref 6–23)
CALCIUM SERPL-MCNC: 9.1 MG/DL (ref 8.6–10.3)
CHLORIDE SERPL-SCNC: 105 MMOL/L (ref 98–107)
CHOLEST SERPL-MCNC: 148 MG/DL (ref 0–199)
CHOLESTEROL/HDL RATIO: 3.1
CO2 SERPL-SCNC: 24 MMOL/L (ref 21–32)
CREAT SERPL-MCNC: 1.27 MG/DL (ref 0.5–1.3)
CRP SERPL-MCNC: 13.03 MG/DL
EGFRCR SERPLBLD CKD-EPI 2021: 56 ML/MIN/1.73M*2
EOSINOPHIL # BLD AUTO: 1.09 X10*3/UL (ref 0–0.4)
EOSINOPHIL NFR BLD AUTO: 11.8 %
ERYTHROCYTE [DISTWIDTH] IN BLOOD BY AUTOMATED COUNT: 13.3 % (ref 11.5–14.5)
ERYTHROCYTE [SEDIMENTATION RATE] IN BLOOD BY WESTERGREN METHOD: 53 MM/H (ref 0–20)
GLUCOSE SERPL-MCNC: 97 MG/DL (ref 74–99)
HCT VFR BLD AUTO: 36.2 % (ref 41–52)
HDLC SERPL-MCNC: 47.1 MG/DL
HGB BLD-MCNC: 11.4 G/DL (ref 13.5–17.5)
IMM GRANULOCYTES # BLD AUTO: 0.12 X10*3/UL (ref 0–0.5)
IMM GRANULOCYTES NFR BLD AUTO: 1.3 % (ref 0–0.9)
LDLC SERPL CALC-MCNC: 77 MG/DL
LYMPHOCYTES # BLD AUTO: 2.01 X10*3/UL (ref 0.8–3)
LYMPHOCYTES NFR BLD AUTO: 21.7 %
MCH RBC QN AUTO: 27.3 PG (ref 26–34)
MCHC RBC AUTO-ENTMCNC: 31.5 G/DL (ref 32–36)
MCV RBC AUTO: 87 FL (ref 80–100)
MONOCYTES # BLD AUTO: 1.08 X10*3/UL (ref 0.05–0.8)
MONOCYTES NFR BLD AUTO: 11.7 %
NEUTROPHILS # BLD AUTO: 4.89 X10*3/UL (ref 1.6–5.5)
NEUTROPHILS NFR BLD AUTO: 52.6 %
NON HDL CHOLESTEROL: 101 MG/DL (ref 0–149)
NRBC BLD-RTO: 0 /100 WBCS (ref 0–0)
PLATELET # BLD AUTO: 319 X10*3/UL (ref 150–450)
POTASSIUM SERPL-SCNC: 4.2 MMOL/L (ref 3.5–5.3)
PROT SERPL-MCNC: 6.4 G/DL (ref 6.4–8.2)
RBC # BLD AUTO: 4.17 X10*6/UL (ref 4.5–5.9)
SODIUM SERPL-SCNC: 139 MMOL/L (ref 136–145)
TRIGL SERPL-MCNC: 121 MG/DL (ref 0–149)
TSH SERPL-ACNC: 1.52 MIU/L (ref 0.44–3.98)
VLDL: 24 MG/DL (ref 0–40)
WBC # BLD AUTO: 9.3 X10*3/UL (ref 4.4–11.3)

## 2024-12-30 PROCEDURE — 80053 COMPREHEN METABOLIC PANEL: CPT

## 2024-12-30 PROCEDURE — 80061 LIPID PANEL: CPT

## 2024-12-30 PROCEDURE — 85025 COMPLETE CBC W/AUTO DIFF WBC: CPT

## 2024-12-30 PROCEDURE — 84443 ASSAY THYROID STIM HORMONE: CPT

## 2024-12-30 PROCEDURE — 86140 C-REACTIVE PROTEIN: CPT

## 2024-12-30 PROCEDURE — 85652 RBC SED RATE AUTOMATED: CPT

## 2025-01-03 DIAGNOSIS — E78.5 HYPERLIPIDEMIA, UNSPECIFIED HYPERLIPIDEMIA TYPE: ICD-10-CM

## 2025-01-03 DIAGNOSIS — M62.838 MUSCLE SPASM: ICD-10-CM

## 2025-01-03 PROCEDURE — RXMED WILLOW AMBULATORY MEDICATION CHARGE

## 2025-01-03 RX ORDER — LOVASTATIN 20 MG/1
20 TABLET ORAL DAILY
Qty: 90 TABLET | Refills: 3 | Status: SHIPPED | OUTPATIENT
Start: 2025-01-03

## 2025-01-03 RX ORDER — ORPHENADRINE CITRATE 100 MG/1
100 TABLET, EXTENDED RELEASE ORAL 2 TIMES DAILY PRN
Qty: 20 TABLET | Refills: 0 | Status: SHIPPED | OUTPATIENT
Start: 2025-01-03 | End: 2025-01-13

## 2025-01-06 ENCOUNTER — APPOINTMENT (OUTPATIENT)
Dept: PRIMARY CARE | Facility: CLINIC | Age: 85
End: 2025-01-06
Payer: MEDICARE

## 2025-01-06 ENCOUNTER — HOSPITAL ENCOUNTER (OUTPATIENT)
Dept: RADIOLOGY | Facility: CLINIC | Age: 85
Discharge: HOME | End: 2025-01-06
Payer: MEDICARE

## 2025-01-06 VITALS
WEIGHT: 238 LBS | TEMPERATURE: 97.9 F | SYSTOLIC BLOOD PRESSURE: 125 MMHG | HEART RATE: 83 BPM | BODY MASS INDEX: 31.4 KG/M2 | RESPIRATION RATE: 20 BRPM | OXYGEN SATURATION: 96 % | DIASTOLIC BLOOD PRESSURE: 75 MMHG

## 2025-01-06 DIAGNOSIS — R05.1 ACUTE COUGH: Primary | ICD-10-CM

## 2025-01-06 DIAGNOSIS — R05.1 ACUTE COUGH: ICD-10-CM

## 2025-01-06 PROCEDURE — 1160F RVW MEDS BY RX/DR IN RCRD: CPT | Performed by: STUDENT IN AN ORGANIZED HEALTH CARE EDUCATION/TRAINING PROGRAM

## 2025-01-06 PROCEDURE — 3074F SYST BP LT 130 MM HG: CPT | Performed by: STUDENT IN AN ORGANIZED HEALTH CARE EDUCATION/TRAINING PROGRAM

## 2025-01-06 PROCEDURE — 1159F MED LIST DOCD IN RCRD: CPT | Performed by: STUDENT IN AN ORGANIZED HEALTH CARE EDUCATION/TRAINING PROGRAM

## 2025-01-06 PROCEDURE — 71046 X-RAY EXAM CHEST 2 VIEWS: CPT | Performed by: RADIOLOGY

## 2025-01-06 PROCEDURE — 3078F DIAST BP <80 MM HG: CPT | Performed by: STUDENT IN AN ORGANIZED HEALTH CARE EDUCATION/TRAINING PROGRAM

## 2025-01-06 PROCEDURE — 71046 X-RAY EXAM CHEST 2 VIEWS: CPT

## 2025-01-06 PROCEDURE — 1157F ADVNC CARE PLAN IN RCRD: CPT | Performed by: STUDENT IN AN ORGANIZED HEALTH CARE EDUCATION/TRAINING PROGRAM

## 2025-01-06 PROCEDURE — G2211 COMPLEX E/M VISIT ADD ON: HCPCS | Performed by: STUDENT IN AN ORGANIZED HEALTH CARE EDUCATION/TRAINING PROGRAM

## 2025-01-06 PROCEDURE — 99214 OFFICE O/P EST MOD 30 MIN: CPT | Performed by: STUDENT IN AN ORGANIZED HEALTH CARE EDUCATION/TRAINING PROGRAM

## 2025-01-06 NOTE — PROGRESS NOTES
Subjective   Maurice Tripp is a 84 y.o. male who presents for Follow-up (Rheumatology/).  Patient seen today for multiple concerns.    Patient had cough, cold symptoms a little progressive improvement in the last 2 weeks.  No sick contacts, only was around 1-year-old over the holidays.    Patient has previous COPD and lung disease, is interested in chest x-ray today.    Overall patient is felt warm down, weak over the past 6 weeks, increasing joint swelling, left ankle swelling.  Patient significant Struve rheumatoid arthritis he has reach out to his rheumatologist.  Lab work did show significantly elevated inflammatory markers as well.    Awaiting a prednisone taper to be sent in.        Review of Systems   Constitutional:  Negative for fever.   Respiratory:  Negative for shortness of breath.    Cardiovascular:  Negative for chest pain.   Gastrointestinal:  Negative for nausea and vomiting.   Neurological:  Negative for dizziness and light-headedness.   All other systems reviewed and are negative.      Objective   Physical Exam  Vitals reviewed.   Constitutional:       General: He is not in acute distress.     Appearance: Normal appearance. He is not toxic-appearing.   HENT:      Head: Normocephalic and atraumatic.      Nose: Nose normal.   Eyes:      Extraocular Movements: Extraocular movements intact.   Cardiovascular:      Rate and Rhythm: Normal rate and regular rhythm.      Heart sounds: No murmur heard.     No friction rub. No gallop.   Pulmonary:      Effort: Pulmonary effort is normal. No respiratory distress.      Breath sounds: Normal breath sounds. No wheezing, rhonchi or rales.   Skin:     General: Skin is warm and dry.   Neurological:      General: No focal deficit present.      Mental Status: He is alert.   Psychiatric:         Mood and Affect: Mood normal.         Behavior: Behavior normal.         Assessment/Plan   Problem List Items Addressed This Visit    None  Visit Diagnoses       Acute cough    n/a  -  Primary    Relevant Medications    azithromycin (Zithromax) 250 mg tablet    Other Relevant Orders    XR chest 2 views (Completed)          Patient seen today for multiple concerns.  If patient does not hear back from rheumatologist in 24 hours we will send in a prednisone taper, will defer further evaluation treatment to rheumatology.    We will order azithromycin, chest x-ray.    Update: Patient's chest x-ray did show bilateral nodules and cannot rule out metastatic disease/malignancy.    Patient is a previous evaluation for pulmonary nodules with pulmonology as had previous PET scan, all of which.  Inflammatory/infectious.    We will treat this as infectious and patient will follow-up with pulmonology.    Follow-up as new concerns arise.      n/a

## 2025-01-07 ENCOUNTER — PHARMACY VISIT (OUTPATIENT)
Dept: PHARMACY | Facility: CLINIC | Age: 85
End: 2025-01-07
Payer: COMMERCIAL

## 2025-01-07 ENCOUNTER — PATIENT MESSAGE (OUTPATIENT)
Dept: PRIMARY CARE | Facility: CLINIC | Age: 85
End: 2025-01-07
Payer: MEDICARE

## 2025-01-07 DIAGNOSIS — M05.79 RHEUMATOID ARTHRITIS INVOLVING MULTIPLE SITES WITH POSITIVE RHEUMATOID FACTOR (MULTI): Primary | ICD-10-CM

## 2025-01-07 RX ORDER — PREDNISONE 10 MG/1
TABLET ORAL
Qty: 15 TABLET | Refills: 0 | Status: SHIPPED | OUTPATIENT
Start: 2025-01-07 | End: 2025-01-22

## 2025-01-09 RX ORDER — AZITHROMYCIN 250 MG/1
TABLET, FILM COATED ORAL
Qty: 6 TABLET | Refills: 0 | Status: SHIPPED | OUTPATIENT
Start: 2025-01-09 | End: 2025-01-14

## 2025-01-11 ASSESSMENT — ENCOUNTER SYMPTOMS
VOMITING: 0
LIGHT-HEADEDNESS: 0
SHORTNESS OF BREATH: 0
DIZZINESS: 0
FEVER: 0
NAUSEA: 0

## 2025-01-27 ENCOUNTER — APPOINTMENT (OUTPATIENT)
Dept: NEUROLOGY | Facility: CLINIC | Age: 85
End: 2025-01-27
Payer: MEDICARE

## 2025-01-27 VITALS
DIASTOLIC BLOOD PRESSURE: 62 MMHG | WEIGHT: 238.5 LBS | TEMPERATURE: 97.1 F | HEART RATE: 85 BPM | HEIGHT: 73 IN | SYSTOLIC BLOOD PRESSURE: 130 MMHG | BODY MASS INDEX: 31.61 KG/M2

## 2025-01-27 DIAGNOSIS — R79.89 OTHER SPECIFIED ABNORMAL FINDINGS OF BLOOD CHEMISTRY: ICD-10-CM

## 2025-01-27 DIAGNOSIS — G60.8 POLYNEUROPATHY, PERIPHERAL SENSORIMOTOR AXONAL: ICD-10-CM

## 2025-01-27 DIAGNOSIS — G25.81 RLS (RESTLESS LEGS SYNDROME): Primary | ICD-10-CM

## 2025-01-27 DIAGNOSIS — M12.839: ICD-10-CM

## 2025-01-27 PROCEDURE — 1036F TOBACCO NON-USER: CPT | Performed by: PSYCHIATRY & NEUROLOGY

## 2025-01-27 PROCEDURE — 99214 OFFICE O/P EST MOD 30 MIN: CPT | Performed by: PSYCHIATRY & NEUROLOGY

## 2025-01-27 PROCEDURE — G2211 COMPLEX E/M VISIT ADD ON: HCPCS | Performed by: PSYCHIATRY & NEUROLOGY

## 2025-01-27 PROCEDURE — 3078F DIAST BP <80 MM HG: CPT | Performed by: PSYCHIATRY & NEUROLOGY

## 2025-01-27 PROCEDURE — 3075F SYST BP GE 130 - 139MM HG: CPT | Performed by: PSYCHIATRY & NEUROLOGY

## 2025-01-27 PROCEDURE — 1159F MED LIST DOCD IN RCRD: CPT | Performed by: PSYCHIATRY & NEUROLOGY

## 2025-01-27 PROCEDURE — 1157F ADVNC CARE PLAN IN RCRD: CPT | Performed by: PSYCHIATRY & NEUROLOGY

## 2025-01-27 RX ORDER — GABAPENTIN 300 MG/1
300 CAPSULE ORAL NIGHTLY
Qty: 30 CAPSULE | Refills: 6 | Status: SHIPPED | OUTPATIENT
Start: 2025-01-27 | End: 2026-01-27

## 2025-01-27 ASSESSMENT — LIFESTYLE VARIABLES
AUDIT-C TOTAL SCORE: 0
HOW OFTEN DO YOU HAVE A DRINK CONTAINING ALCOHOL: NEVER
HOW MANY STANDARD DRINKS CONTAINING ALCOHOL DO YOU HAVE ON A TYPICAL DAY: PATIENT DOES NOT DRINK
SKIP TO QUESTIONS 9-10: 1
HOW OFTEN DO YOU HAVE SIX OR MORE DRINKS ON ONE OCCASION: NEVER

## 2025-01-27 ASSESSMENT — PATIENT HEALTH QUESTIONNAIRE - PHQ9
1. LITTLE INTEREST OR PLEASURE IN DOING THINGS: NOT AT ALL
2. FEELING DOWN, DEPRESSED OR HOPELESS: NOT AT ALL
SUM OF ALL RESPONSES TO PHQ9 QUESTIONS 1 & 2: 0

## 2025-01-27 NOTE — PROGRESS NOTES
Consulting Physician: Dr. Almeida    Chief Complaint: Neuropathy    History Of Present Illness  Maurice Tripp is a 84 y.o. male presenting with neuropathy.    The patient developed numbness in his feet about 1 year ago.  He denies any painful paresthesias.  He does note an achy sensation in his legs.  In fact, he takes Tylenol every 12 hours due to this achy pan.  He notes occasional low back pain.  He denies any lower extremity weakness.  His wife notes that his gait is off.  Specifically, his wife notes that the patient has to brace himself in order to stand up.  He denies any weakness in his arms.  He denies any double vision, slurred speech, or facial droop.       Past Medical History  Past Medical History:   Diagnosis Date    Acute bronchitis 05/12/2023    Acute non-recurrent maxillary sinusitis 05/12/2023    Alcohol dependence 05/12/2023    BRBPR (bright red blood per rectum) 05/12/2023    Fungal nail infection 05/12/2023    Right cervical radiculopathy 05/12/2023    Subacute cough 05/12/2023       Surgical History  Past Surgical History:   Procedure Laterality Date    CT GUIDED PERCUTANEOUS BIOPSY LUNG  2/10/2023    CT GUIDED PERCUTANEOUS BIOPSY LUNG 2/10/2023 DOCTOR OFFICE [facility]GAC[facility]       Family History  Family History   Problem Relation Name Age of Onset    Aortic aneurysm Mother      Heart attack Father          passed away at age 52        Social History   reports that he quit smoking about 40 years ago. His smoking use included cigarettes. He has never used smokeless tobacco. He reports that he does not currently use alcohol. He reports that he does not use drugs.     Allergies  Patient has no known allergies.    Medications    Current Outpatient Medications:     acetaminophen (Tylenol) 500 mg tablet, Take 2 tablets (1,000 mg) by mouth 2 times a day., Disp: , Rfl:     cetirizine (ZyrTEC) 10 mg tablet, Take 1 tablet (10 mg) by mouth once daily as needed for allergies., Disp: , Rfl:     citalopram (CeleXA) 20  "mg tablet, TAKE 1 TABLET DAILY, Disp: 90 tablet, Rfl: 3    fluticasone (Flonase) 50 mcg/actuation nasal spray, Administer 1 spray into each nostril once daily. Shake gently. Before first use, prime pump. After use, clean tip and replace cap., Disp: 16 g, Rfl: 2    hydroxychloroquine (Plaquenil) 200 mg tablet, TAKE 2 TABLETS ONCE DAILY, Disp: 180 tablet, Rfl: 3    leflunomide (Arava) 20 mg tablet, Take 1 tablet (20 mg) by mouth once daily., Disp: 90 tablet, Rfl: 3    losartan (Cozaar) 100 mg tablet, TAKE ONE-HALF (1/2) TABLET DAILY, Disp: 45 tablet, Rfl: 3    lovastatin (Mevacor) 20 mg tablet, TAKE 1 TABLET DAILY AS DIRECTED, Disp: 90 tablet, Rfl: 3    omega-3 acid ethyl esters (Lovaza) 1 gram capsule, Take 1 capsule (1 g) by mouth once daily., Disp: , Rfl:     omeprazole (PriLOSEC) 10 mg DR capsule, TAKE 1 CAPSULE DAILY, Disp: 90 capsule, Rfl: 3    orphenadrine (Norflex) 100 mg 12 hr tablet, Take 1 tablet (100 mg) by mouth 2 times a day as needed for muscle spasms for up to 10 days. Do not crush, chew, or split., Disp: 20 tablet, Rfl: 0    predniSONE (Deltasone) 10 mg tablet, Take 1.5 tablets (15 mg) by mouth once daily for 5 days, THEN 1 tablet (10 mg) once daily for 5 days, THEN 0.5 tablets (5 mg) once daily for 5 days., Disp: 15 tablet, Rfl: 0    vit A/vit C/vit E/zinc/copper (PRESERVISION AREDS ORAL), Take 1 capsule by mouth once daily., Disp: , Rfl:       Last Recorded Vitals   Blood pressure 130/62, pulse 85, temperature 36.2 °C (97.1 °F), temperature source Temporal, height 1.854 m (6' 1\"), weight 108 kg (238 lb 8 oz).    Objective:  Gen: NAD  Neuro:  --HIF: A&O X 3, repetition and naming intact  --CN:  PERRLA, EOMI, VFF, no visible facial asymmetry, facial sensation intact, no tongue or palatal deviation, SCM intact  --Motor: Moves all 4 extremities equally; no focal deficits  --Sensory: Pin is reduced from the toes to the ankles; vibration is absent in the toes, reduced in the ankle  --Reflex: 3+ in UE, 3+ " "patella, absent achilles, toes down; ordonez's negative  --Cerebellum: FTN and HTS intact  --Gait: Mildly wide based gait    Relevant Results  Lab Results   Component Value Date    WBC 9.3 12/30/2024    HGB 11.4 (L) 12/30/2024    HCT 36.2 (L) 12/30/2024    MCV 87 12/30/2024     12/30/2024       Lab Results   Component Value Date    GLUCOSE 97 12/30/2024    CALCIUM 9.1 12/30/2024     12/30/2024    K 4.2 12/30/2024    CO2 24 12/30/2024     12/30/2024    BUN 21 12/30/2024    CREATININE 1.27 12/30/2024       No results found for: \"HGBA1C\"    Lab Results   Component Value Date    CHOL 148 12/30/2024    CHOL 138 08/14/2023    CHOL 149 07/20/2022     Lab Results   Component Value Date    HDL 47.1 12/30/2024    HDL 36.3 (A) 08/14/2023    HDL 44.0 07/20/2022     Lab Results   Component Value Date    LDLCALC 77 12/30/2024     Lab Results   Component Value Date    TRIG 121 12/30/2024    TRIG 236 (H) 08/14/2023    TRIG 142 07/20/2022     No components found for: \"CHOLHDL\"    Vitamin B12 441  TSH 1.52     Assessment:   Sensorimotor Polyneuropathy  - the patient has had numbness in his feet for the past year  - on exam, he has distal sensory loss as described above  - EMG/NCV notable for an axonal polyneuropathy  - possible etiologies for neuropathy include adverse effects of Leflunomide    Plan:  - recommend labs to evaluate for other etiologies of neuropathy: SPEP with LEONARD, Hemoglobin A1C, vitamin b6  - consider discussing with Rheumatologist about alternatives to Leflunomide    2.  RLS - patient describes an achy sensation in his legs that occurs when lying down and resolves when he stands and walks around  - will check Ferritin  - will start Gabapentin 300 mg at bedtime (reviewed side effects)    Follow up in 6 months        Nicholas Houser MD  [facility]  Department of Neurology      A copy of this note was sent to the referring provider.    "

## 2025-01-28 LAB — FERRITIN SERPL-MCNC: 477 NG/ML (ref 24–380)

## 2025-02-01 LAB
EST. AVERAGE GLUCOSE BLD GHB EST-MCNC: 123 MG/DL
EST. AVERAGE GLUCOSE BLD GHB EST-SCNC: 6.8 MMOL/L
HBA1C MFR BLD: 5.9 % OF TOTAL HGB
PYRIDOXAL PHOS SERPL-MCNC: 4 NG/ML (ref 2.1–21.7)

## 2025-02-04 ENCOUNTER — APPOINTMENT (OUTPATIENT)
Dept: PULMONOLOGY | Facility: CLINIC | Age: 85
End: 2025-02-04
Payer: MEDICARE

## 2025-02-04 VITALS
HEART RATE: 81 BPM | OXYGEN SATURATION: 93 % | BODY MASS INDEX: 31.99 KG/M2 | HEIGHT: 73 IN | RESPIRATION RATE: 18 BRPM | TEMPERATURE: 97.3 F | WEIGHT: 241.4 LBS | SYSTOLIC BLOOD PRESSURE: 117 MMHG | DIASTOLIC BLOOD PRESSURE: 72 MMHG

## 2025-02-04 DIAGNOSIS — R91.8 MULTIPLE LUNG NODULES: Primary | ICD-10-CM

## 2025-02-04 DIAGNOSIS — M06.9 RHEUMATOID ARTHRITIS, INVOLVING UNSPECIFIED SITE, UNSPECIFIED WHETHER RHEUMATOID FACTOR PRESENT (MULTI): ICD-10-CM

## 2025-02-04 PROCEDURE — 3078F DIAST BP <80 MM HG: CPT | Performed by: INTERNAL MEDICINE

## 2025-02-04 PROCEDURE — 99214 OFFICE O/P EST MOD 30 MIN: CPT | Performed by: INTERNAL MEDICINE

## 2025-02-04 PROCEDURE — 1159F MED LIST DOCD IN RCRD: CPT | Performed by: INTERNAL MEDICINE

## 2025-02-04 PROCEDURE — 3074F SYST BP LT 130 MM HG: CPT | Performed by: INTERNAL MEDICINE

## 2025-02-04 PROCEDURE — 1157F ADVNC CARE PLAN IN RCRD: CPT | Performed by: INTERNAL MEDICINE

## 2025-02-04 ASSESSMENT — PATIENT HEALTH QUESTIONNAIRE - PHQ9
2. FEELING DOWN, DEPRESSED OR HOPELESS: SEVERAL DAYS
10. IF YOU CHECKED OFF ANY PROBLEMS, HOW DIFFICULT HAVE THESE PROBLEMS MADE IT FOR YOU TO DO YOUR WORK, TAKE CARE OF THINGS AT HOME, OR GET ALONG WITH OTHER PEOPLE: NOT DIFFICULT AT ALL
SUM OF ALL RESPONSES TO PHQ9 QUESTIONS 1 AND 2: 1
1. LITTLE INTEREST OR PLEASURE IN DOING THINGS: NOT AT ALL

## 2025-02-04 ASSESSMENT — ENCOUNTER SYMPTOMS
LOSS OF SENSATION IN FEET: 1
DEPRESSION: 1
OCCASIONAL FEELINGS OF UNSTEADINESS: 0

## 2025-02-04 ASSESSMENT — COLUMBIA-SUICIDE SEVERITY RATING SCALE - C-SSRS
6. HAVE YOU EVER DONE ANYTHING, STARTED TO DO ANYTHING, OR PREPARED TO DO ANYTHING TO END YOUR LIFE?: NO
2. HAVE YOU ACTUALLY HAD ANY THOUGHTS OF KILLING YOURSELF?: NO
1. IN THE PAST MONTH, HAVE YOU WISHED YOU WERE DEAD OR WISHED YOU COULD GO TO SLEEP AND NOT WAKE UP?: NO

## 2025-02-05 ENCOUNTER — TELEPHONE (OUTPATIENT)
Dept: PRIMARY CARE | Facility: CLINIC | Age: 85
End: 2025-02-05
Payer: MEDICARE

## 2025-02-05 DIAGNOSIS — M06.9 RHEUMATOID ARTHRITIS, INVOLVING UNSPECIFIED SITE, UNSPECIFIED WHETHER RHEUMATOID FACTOR PRESENT (MULTI): Primary | ICD-10-CM

## 2025-02-05 RX ORDER — PREDNISONE 50 MG/1
50 TABLET ORAL DAILY
Qty: 5 TABLET | Refills: 0 | Status: SHIPPED | OUTPATIENT
Start: 2025-02-05 | End: 2025-02-10

## 2025-02-05 NOTE — TELEPHONE ENCOUNTER
NEEDS MEDICATION LIKE A STEROID - PREDNISONE OR CORTIZONE- FOR FEET SWELLING      MORE SO IN LEFT FOOT!!!       PATIENT DOES HAVE AN APPOINTMENT COMING UP    predniSONE (Deltasone) 10 mg tablet [[id number]]      Order Details  Dose, Route, Frequency: As Directed   Dispense Quantity: 15 tablet Refills: 1          Sig: Take 1.5 tablets (15 mg) by mouth once daily for 5 days, THEN 1 tablet (10 mg) once daily for 5 days, THEN 0.5 tablets (5 mg) once daily for 5 days.           [id number] - [place], [place] - [address]  Phone: [phone]   Fax: [phone]

## 2025-02-12 ENCOUNTER — APPOINTMENT (OUTPATIENT)
Dept: RHEUMATOLOGY | Facility: CLINIC | Age: 85
End: 2025-02-12
Payer: MEDICARE

## 2025-02-12 ENCOUNTER — OFFICE VISIT (OUTPATIENT)
Dept: RHEUMATOLOGY | Facility: CLINIC | Age: 85
End: 2025-02-12
Payer: MEDICARE

## 2025-02-12 VITALS
WEIGHT: 237 LBS | RESPIRATION RATE: 20 BRPM | DIASTOLIC BLOOD PRESSURE: 76 MMHG | HEART RATE: 82 BPM | SYSTOLIC BLOOD PRESSURE: 136 MMHG | BODY MASS INDEX: 31.27 KG/M2 | TEMPERATURE: 97.9 F

## 2025-02-12 DIAGNOSIS — E55.9 VITAMIN D DEFICIENCY: ICD-10-CM

## 2025-02-12 DIAGNOSIS — Z79.899 LONG-TERM USE OF PLAQUENIL: ICD-10-CM

## 2025-02-12 DIAGNOSIS — R91.8 MULTIPLE PULMONARY NODULES DETERMINED BY COMPUTED TOMOGRAPHY OF LUNG: ICD-10-CM

## 2025-02-12 DIAGNOSIS — Z79.899 ON LONG TERM LEFLUNOMIDE THERAPY: ICD-10-CM

## 2025-02-12 DIAGNOSIS — M05.79 RHEUMATOID ARTHRITIS INVOLVING MULTIPLE SITES WITH POSITIVE RHEUMATOID FACTOR (MULTI): Primary | ICD-10-CM

## 2025-02-12 PROCEDURE — 1157F ADVNC CARE PLAN IN RCRD: CPT | Performed by: STUDENT IN AN ORGANIZED HEALTH CARE EDUCATION/TRAINING PROGRAM

## 2025-02-12 PROCEDURE — 3075F SYST BP GE 130 - 139MM HG: CPT | Performed by: STUDENT IN AN ORGANIZED HEALTH CARE EDUCATION/TRAINING PROGRAM

## 2025-02-12 PROCEDURE — 1036F TOBACCO NON-USER: CPT | Performed by: STUDENT IN AN ORGANIZED HEALTH CARE EDUCATION/TRAINING PROGRAM

## 2025-02-12 PROCEDURE — 1160F RVW MEDS BY RX/DR IN RCRD: CPT | Performed by: STUDENT IN AN ORGANIZED HEALTH CARE EDUCATION/TRAINING PROGRAM

## 2025-02-12 PROCEDURE — 3078F DIAST BP <80 MM HG: CPT | Performed by: STUDENT IN AN ORGANIZED HEALTH CARE EDUCATION/TRAINING PROGRAM

## 2025-02-12 PROCEDURE — 99215 OFFICE O/P EST HI 40 MIN: CPT | Performed by: STUDENT IN AN ORGANIZED HEALTH CARE EDUCATION/TRAINING PROGRAM

## 2025-02-12 PROCEDURE — 1159F MED LIST DOCD IN RCRD: CPT | Performed by: STUDENT IN AN ORGANIZED HEALTH CARE EDUCATION/TRAINING PROGRAM

## 2025-02-12 PROCEDURE — G2211 COMPLEX E/M VISIT ADD ON: HCPCS | Performed by: STUDENT IN AN ORGANIZED HEALTH CARE EDUCATION/TRAINING PROGRAM

## 2025-02-12 RX ORDER — PREDNISONE 10 MG/1
TABLET ORAL
Qty: 15 TABLET | Refills: 3 | Status: SHIPPED | OUTPATIENT
Start: 2025-02-12 | End: 2025-02-27

## 2025-02-12 NOTE — PROGRESS NOTES
Subjective   Patient ID: [id number]   Maurice Tripp is a 84 y.o. male with seropositive RA, obesity with a BMI of 32, DL, HTN, and CKD stage III, presents for follow up for RA    Current IS:  - HCQ 400mg QD (eye exam UTD 10/2023- [facility], no toxicity from plaquanil)   - Leflunomide 20mg QD ( for at least 2-3 years)     Prior IS:  - MTX was on it for a long time, switched over to Leflunomide as he was on MTX for too long     HPI  Dr. Camarena reached out to me to switch his Lef due to his lung findings  Flare in Dec, was prescribed pred by Dr. Petty, 2 more flares since, mainly in his hands, wrists and knees with swelling and AM stiffness  He was using Tylenol and prednisone, it helped a lot   Reports seeing optho a year ago and no retinal toxicity   Has a concert this weekend, playing the [employer]  Saw cardiology for possible PAD, seems more neuropathy, ordered ABIs -> unremarkable   Saw PCP and ordered an EMG for his feet neuropathy and some blood tests   Last eye exam was 6 months, everything was good as per pt   Saw pulm Dr. Venegas, had growth in his pulmonary nodules with fatigue and cough, ordered a repeat biopsy  02/2024 - Biopsy of YOSI nodule with organizing PNA, chronic inflammation, focal necrosis (possible granuloma)   Repeat CT in 6 months   Saw hem/onc -> no malignancy  He was an , retired last year  Recently diagnosed with SHA, wearing the CPAP now  Sees dermatology once a year, no significant rashes  The patient is doing well otherwise   Compliant with meds  No side effects reported  No episodes of eye inflammation  No sicca sx  No chest pain, cough or dyspnea  No rashes  No infections    ROS:  As per HPI     Rheum hx (Recall from Dr. Craven's notes):  Saw Dr. Roldan 2014 (previously Dr. Servando Barrientos- RA diagnosis, + RF tried gold shots ineffective, MTX + HCQ since ~2004), then saw Dr. Barry.      In the morning, on waking up, gets pain in feet and stiffness in feet/ankles. AM  stiffness in feet can last few hours. Denies significant pain/swelling/stiffness in hand, wrists, elbow or shoulder currently. Sees Podiatry (hx of hammertoe involving all digits). No skin rashes. + dry mouth at night, no dry eyes, no hx of ocular inflammation. No hx of rheumatoid nodules. Has had paresthesia in feet, EMG/NCS done twice and states the tests were ok. No night sweats, unintentional weight loss, or lymph node swelling . No bowel issues, had a colonoscopy 2 months ago, no concerns     No significant fmhx   Wife recently had a hip surgery. 2 sons. 3 grandkids    Patient Active Problem List   Diagnosis    Abnormal CXR (chest x-ray)    Anxiety    BPH (benign prostatic hyperplasia)    Elevated PSA    GERD (gastroesophageal reflux disease)    Grade I hemorrhoids    Hyperlipidemia    Hypertension    Male erectile disorder    Multiple pulmonary nodules determined by computed tomography of lung    Rheumatoid arthritis    Stage 3a chronic kidney disease (Multi)    Vitamin D deficiency    Shortness of breath    Other nonthrombocytopenic purpura    Anemia    Obstructive sleep apnea    Muscle weakness (generalized)    Lung field abnormal    Long term (current) use of unspecified immunomodulators and immunosuppressants    Fatigue    Dyslipidemia    Difficulty in walking, not elsewhere classified    Disorder of iron metabolism    Alcohol dependence, in remission    Acquired hammertoe of right foot    Acquired hallux malleus    Abnormal gait    Achilles tendinitis    Thrombocytopenia, unspecified (CMS-HCC)    Environmental and seasonal allergies    Allergic rhinitis    Atypical chest pain    Elevated troponin    Coronary artery calcification      Past Medical History:   Diagnosis Date    Acute bronchitis 05/12/2023    Acute non-recurrent maxillary sinusitis 05/12/2023    Alcohol dependence 05/12/2023    BRBPR (bright red blood per rectum) 05/12/2023    Fungal nail infection 05/12/2023    Right cervical radiculopathy  2023    Subacute cough 2023      Past Surgical History:   Procedure Laterality Date    CT GUIDED PERCUTANEOUS BIOPSY LUNG  2/10/2023    CT GUIDED PERCUTANEOUS BIOPSY LUNG 2/10/2023 DOCTOR OFFICE [facility]GAC[facility]      Social History     Socioeconomic History    Marital status:      Spouse name: Not on file    Number of children: Not on file    Years of education: Not on file    Highest education level: Not on file   Occupational History    Not on file   Tobacco Use    Smoking status: Former     Current packs/day: 0.00     Types: Cigarettes     Quit date:      Years since quittin.1    Smokeless tobacco: Never   Substance and Sexual Activity    Alcohol use: Not Currently     Comment: Hx: AA quit .    Drug use: Never    Sexual activity: Defer   Other Topics Concern    Not on file   Social History Narrative    Not on file     Social Drivers of Health     Financial Resource Strain: Low Risk  (10/18/2024)    Overall Financial Resource Strain (CARDIA)     Difficulty of Paying Living Expenses: Not hard at all   Food Insecurity: No Food Insecurity (3/5/2024)    Hunger Vital Sign     Worried About Running Out of Food in the Last Year: Never true     Ran Out of Food in the Last Year: Never true   Transportation Needs: No Transportation Needs (10/18/2024)    PRAPARE - Transportation     Lack of Transportation (Medical): No     Lack of Transportation (Non-Medical): No   Physical Activity: Inactive (10/18/2024)    Exercise Vital Sign     Days of Exercise per Week: 0 days     Minutes of Exercise per Session: 0 min   Stress: No Stress Concern Present (10/18/2024)    [employer] [place] [employer] - Occupational Stress Questionnaire     Feeling of Stress : Not at all   Social Connections: Moderately Isolated (10/18/2024)    Social Connection and Isolation Panel [NHANES]     Frequency of Communication with Friends and Family: Once a week     Frequency of Social Gatherings with Friends and Family: Once a  week     Attends Cheondoism Services: Never     Active Member of Clubs or Organizations: Yes     Attends Club or Organization Meetings: More than 4 times per year     Marital Status:    Intimate Partner Violence: Not At Risk (10/18/2024)    Humiliation, Afraid, Rape, and Kick questionnaire     Fear of Current or Ex-Partner: No     Emotionally Abused: No     Physically Abused: No     Sexually Abused: No   Housing Stability: Unknown (10/18/2024)    Housing Stability Vital Sign     Unable to Pay for Housing in the Last Year: No     Number of Times Moved in the Last Year: Not on file     Homeless in the Last Year: No      No Known Allergies     Current Outpatient Medications:     acetaminophen (Tylenol) 500 mg tablet, Take 2 tablets (1,000 mg) by mouth 2 times a day., Disp: , Rfl:     cetirizine (ZyrTEC) 10 mg tablet, Take 1 tablet (10 mg) by mouth once daily as needed for allergies., Disp: , Rfl:     citalopram (CeleXA) 20 mg tablet, TAKE 1 TABLET DAILY, Disp: 90 tablet, Rfl: 3    fluticasone (Flonase) 50 mcg/actuation nasal spray, Administer 1 spray into each nostril once daily. Shake gently. Before first use, prime pump. After use, clean tip and replace cap., Disp: 16 g, Rfl: 2    gabapentin (Neurontin) 300 mg capsule, Take 1 capsule (300 mg) by mouth once daily at bedtime., Disp: 30 capsule, Rfl: 6    hydroxychloroquine (Plaquenil) 200 mg tablet, TAKE 2 TABLETS ONCE DAILY, Disp: 180 tablet, Rfl: 3    losartan (Cozaar) 100 mg tablet, TAKE ONE-HALF (1/2) TABLET DAILY, Disp: 45 tablet, Rfl: 3    lovastatin (Mevacor) 20 mg tablet, TAKE 1 TABLET DAILY AS DIRECTED, Disp: 90 tablet, Rfl: 3    omega-3 acid ethyl esters (Lovaza) 1 gram capsule, Take 1 capsule (1 g) by mouth once daily., Disp: , Rfl:     omeprazole (PriLOSEC) 10 mg DR capsule, TAKE 1 CAPSULE DAILY, Disp: 90 capsule, Rfl: 3    orphenadrine (Norflex) 100 mg 12 hr tablet, Take 1 tablet (100 mg) by mouth 2 times a day as needed for muscle spasms for up to  10 days. Do not crush, chew, or split., Disp: 20 tablet, Rfl: 0    predniSONE (Deltasone) 10 mg tablet, Take 1.5 tablets (15 mg) by mouth once daily for 5 days, THEN 1 tablet (10 mg) once daily for 5 days, THEN 0.5 tablets (5 mg) once daily for 5 days., Disp: 15 tablet, Rfl: 3    vit A/vit C/vit E/zinc/copper (PRESERVISION AREDS ORAL), Take 1 capsule by mouth once daily., Disp: , Rfl:      Objective   Visit Vitals  /76   Pulse 82   Temp 36.6 °C (97.9 °F)   Resp 20     Physical Exam:  General: AAOx3, Cooperative  Eyes: EOMI, conjunctiva clear, sclera white, anicteric  Throat/Mouth: No oral deformities, no cheek swelling, mucosa appear moist, no oral ulcers noted or loss of dentition   Skin: No rashes, ulcers or photosensitive areas  MSK: Upper Extremities:  Hand/Fingers: Synovial swelling in his bilateral 2nd and 3rd PIPs with TTP. No erythema, edema, tenderness or warmth at DIP, PIP, or MCP joints, FROM grossly. Good hand . No nodules. No deformities   Wrists: No erythema, edema, warmth or tenderness at wrist, FROM grossly  Elbows: No tenderness, edema, erythema or warmth at elbows, FROM grossly. No nodules   Shoulders: No edema, erythema, tenderness or warmth at shoulders. FROM  Lower Extremities:   Hips: No obvious deformities. No joint tenderness, normal ROM grossly. No trochanteric bursae TTP  Knees: No tenderness, deformities, edema, rashes, or warmth, normal ROM grossly. No crepitus, no pes anserine bursa TTP   Ankles, feet: No deformities, tenderness, edema, erythema, ulceration, or warmth at the ankle or MTP/IP joints, normal ROM grossly  Spine: No spinal tenderness to palpation. No SI joint tenderness    Lab Results   Component Value Date    WBC 9.3 12/30/2024    HGB 11.4 (L) 12/30/2024    HCT 36.2 (L) 12/30/2024    MCV 87 12/30/2024     12/30/2024        Chemistry    Lab Results   Component Value Date/Time     12/30/2024 0912    K 4.2 12/30/2024 0912     12/30/2024 0912    CO2  24 12/30/2024 0912    BUN 21 12/30/2024 0912    CREATININE 1.27 12/30/2024 0912    Lab Results   Component Value Date/Time    CALCIUM 9.1 12/30/2024 0912    ALKPHOS 47 12/30/2024 0912    AST 13 12/30/2024 0912    ALT 11 12/30/2024 0912    BILITOT 0.4 12/30/2024 0912         Lab Results   Component Value Date    CRP 13.03 (H) 12/30/2024      Lab Results   Component Value Date    SEDRATE 53 (H) 12/30/2024      Lab Results   Component Value Date    ALT 11 12/30/2024    AST 13 12/30/2024    ALKPHOS 47 12/30/2024    BILITOT 0.4 12/30/2024      === 02/10/23 ===  XR CHEST  No sizable pneumothorax status post recent biopsy.    CT chest 7/23:  Stable scattered pulmonary nodules measuring 1.7 cm or less in size.  No new suspicious pulmonary nodule or mass. Continued surveillance recommended as per Fleischner society guidelines.      Assessment/Plan    This is an 84 Y M with seropositive RA (, CCP 95), presenting for follow up  Last seen in 9/24    Labs:  1/25:  ferritin 477   12/24: CRP 13, ESR 53, Hb 11.4, AEC 1000, Cr 1.27, rest of CBC, CMP, TSH wnl  9/24: Hb 11.8, rest of CBC, CMP wnl  5/24: Hb 11.8, Cr 1.26, CRP 1.07, ferritin 513, % sat 22, rest of CBC, CMP, ESR, iron studies, TSH, vitamin B12 and D wnl   2/24: Hb 12.1, rest of CBC  1/24: Hb 12.9, Cr 1.35, rest of CBC, CMP, UA, Uprt,   11/23: Hb 11.9, Cr 1.36, rest of CBC, CMP, ESR, CRP,   7/23: Hg improved, Cr 1.36  4/2023 ESR/CRP nml. ANCA with MPO/PR3 negative  3/21/23 Hg12. 4, GFR 57    CT chest 9/24:  1. No thoracic or abdominal aortic aneurysm or dissection.  2. Severe coronary calcification  3. Apparent growing irregular pulmonary nodules which remain concerning for malignancy continued surveillance advised  4. Constipation. Mild gastric wall thickening  5. Enlarged prostate gland.    PFTs 6/2023, no obstruction. DLCO nml     # Seropositive RA, CDAI: 19, will start bDMARD and stop Arava   # Lung nodule- sees Pulm/Onc. Biopsy shows organizing PNA. Given  inflammatory arthritis is overall well controlled, less likely RA related and notable there has been some decrease in size without change in RA medications    - Discussion done about bDMARD with the pt, risks and benefits, he is willing to try TNF inhibitors first   - Labs today (CBC, CMP, ESR, CRP, prebiologic work up) and with next radha   - Will message [facility] when the prbiologic work up is out   - Discontinue Leflunomide 20mg daily  - Continue HCQ 400mg daily (eye exam UTD)  - Follow up with Pulm for cavitation/nodules  - Follow up with ophtho  - Vitamin D/Ca . Denies hx of fragility fx  - Vaccine UTD including COVID 19, flu, PNA and shingles vaccinations (2 series) and RSV  - Consider DEXA with next radha     RTC in 3 months    Plan, including risks and benefits, was discussed with the patient, informed on how to reach us.     To schedule an appointment, call [phone]  To reach the rheumatology office, call [phone]    Trice De Los Santos MD   Division of Rheumatology  [facility]

## 2025-02-15 LAB
ALBUMIN SERPL-MCNC: 3.9 G/DL (ref 3.6–5.1)
ALP SERPL-CCNC: 67 U/L (ref 35–144)
ALT SERPL-CCNC: 12 U/L (ref 9–46)
ANION GAP SERPL CALCULATED.4IONS-SCNC: 10 MMOL/L (CALC) (ref 7–17)
AST SERPL-CCNC: 15 U/L (ref 10–35)
BASOPHILS # BLD AUTO: 116 CELLS/UL (ref 0–200)
BASOPHILS NFR BLD AUTO: 1.1 %
BILIRUB SERPL-MCNC: 0.4 MG/DL (ref 0.2–1.2)
BUN SERPL-MCNC: 26 MG/DL (ref 7–25)
CALCIUM SERPL-MCNC: 9.5 MG/DL (ref 8.6–10.3)
CHLORIDE SERPL-SCNC: 104 MMOL/L (ref 98–110)
CO2 SERPL-SCNC: 26 MMOL/L (ref 20–32)
CREAT SERPL-MCNC: 1.2 MG/DL (ref 0.7–1.22)
CRP SERPL-MCNC: 10.6 MG/L
EGFRCR SERPLBLD CKD-EPI 2021: 60 ML/MIN/1.73M2
EOSINOPHIL # BLD AUTO: 515 CELLS/UL (ref 15–500)
EOSINOPHIL NFR BLD AUTO: 4.9 %
ERYTHROCYTE [DISTWIDTH] IN BLOOD BY AUTOMATED COUNT: 13.5 % (ref 11–15)
ERYTHROCYTE [SEDIMENTATION RATE] IN BLOOD BY WESTERGREN METHOD: 33 MM/H
GLUCOSE SERPL-MCNC: 86 MG/DL (ref 65–99)
HBV CORE AB SERPL QL IA: NORMAL
HBV SURFACE AB SERPL IA-ACNC: <5 MIU/ML
HBV SURFACE AG SERPL QL IA: NORMAL
HCT VFR BLD AUTO: 39 % (ref 38.5–50)
HGB BLD-MCNC: 12.6 G/DL (ref 13.2–17.1)
IGNF NEG CNTRL BLD: NORMAL
LYMPHOCYTES # BLD AUTO: 1932 CELLS/UL (ref 850–3900)
LYMPHOCYTES NFR BLD AUTO: 18.4 %
M TB IFN-G BLD-IMP: NEGATIVE
MCH RBC QN AUTO: 28.3 PG (ref 27–33)
MCHC RBC AUTO-ENTMCNC: 32.3 G/DL (ref 32–36)
MCV RBC AUTO: 87.4 FL (ref 80–100)
MITOGEN IGNF.SPOT COUNT BLD: NORMAL
MONOCYTES # BLD AUTO: 1260 CELLS/UL (ref 200–950)
MONOCYTES NFR BLD AUTO: 12 %
NEUTROPHILS # BLD AUTO: 6678 CELLS/UL (ref 1500–7800)
NEUTROPHILS NFR BLD AUTO: 63.6 %
PLATELET # BLD AUTO: 404 THOUSAND/UL (ref 140–400)
PMV BLD REES-ECKER: 10.1 FL (ref 7.5–12.5)
POTASSIUM SERPL-SCNC: 4.4 MMOL/L (ref 3.5–5.3)
PROT SERPL-MCNC: 6.5 G/DL (ref 6.1–8.1)
QUEST PANEL A SPOT COUNT: 0
QUEST PANEL B SPOT COUNT: 0
RBC # BLD AUTO: 4.46 MILLION/UL (ref 4.2–5.8)
SERVICE CMNT-IMP: ABNORMAL
SODIUM SERPL-SCNC: 140 MMOL/L (ref 135–146)
WBC # BLD AUTO: 10.5 THOUSAND/UL (ref 3.8–10.8)

## 2025-02-17 DIAGNOSIS — M05.79 RHEUMATOID ARTHRITIS INVOLVING MULTIPLE SITES WITH POSITIVE RHEUMATOID FACTOR (MULTI): Primary | ICD-10-CM

## 2025-02-17 RX ORDER — ADALIMUMAB 40MG/0.4ML
40 KIT SUBCUTANEOUS
Qty: 2 EACH | Refills: 3 | Status: SHIPPED | OUTPATIENT
Start: 2025-02-17

## 2025-02-17 NOTE — PROGRESS NOTES
Per Dr. De Los Santos:       Patient has uncontrolled RA, would like to start him on TNF inhibitor. Can we get Humira approval   Pt with [place] [employer]- believe humira on their formulary

## 2025-02-18 ENCOUNTER — SPECIALTY PHARMACY (OUTPATIENT)
Dept: PHARMACY | Facility: CLINIC | Age: 85
End: 2025-02-18

## 2025-02-21 ENCOUNTER — HOSPITAL ENCOUNTER (OUTPATIENT)
Dept: RADIOLOGY | Facility: HOSPITAL | Age: 85
Discharge: HOME | End: 2025-02-21
Payer: MEDICARE

## 2025-02-21 DIAGNOSIS — R91.8 LUNG NODULES: ICD-10-CM

## 2025-02-21 PROCEDURE — 71250 CT THORAX DX C-: CPT

## 2025-02-24 ENCOUNTER — SPECIALTY PHARMACY (OUTPATIENT)
Dept: PHARMACY | Facility: CLINIC | Age: 85
End: 2025-02-24

## 2025-02-24 PROCEDURE — RXMED WILLOW AMBULATORY MEDICATION CHARGE

## 2025-02-25 ENCOUNTER — PHARMACY VISIT (OUTPATIENT)
Dept: PHARMACY | Facility: CLINIC | Age: 85
End: 2025-02-25
Payer: COMMERCIAL

## 2025-02-26 ENCOUNTER — SPECIALTY PHARMACY (OUTPATIENT)
Dept: PHARMACY | Facility: CLINIC | Age: 85
End: 2025-02-26

## 2025-02-26 ENCOUNTER — TELEMEDICINE CLINICAL SUPPORT (OUTPATIENT)
Dept: PHARMACY | Facility: HOSPITAL | Age: 85
End: 2025-02-26
Payer: MEDICARE

## 2025-02-26 DIAGNOSIS — M05.79 RHEUMATOID ARTHRITIS INVOLVING MULTIPLE SITES WITH POSITIVE RHEUMATOID FACTOR (MULTI): ICD-10-CM

## 2025-02-26 NOTE — PROGRESS NOTES
[facility] Clinical Note  Initial Patient Education     Introduction  Maurice Tripp is a 84 y.o. male who is on the specialty pharmacy service for management of: Rheumatology Core.    Maurice Tripp is initiating the following therapy: Humira 40 mg every 14 days    Medication receipt date: 2/26/25  Duration of therapy: Maintenance    The most recent encounter visit with the referring prescriber Dr. De Los Santos on 2/12/25 was reviewed.  Pharmacy will continue to collaborate in the care of this patient with the referring prescriber.    Clinical Background  An initial assessment was conducted prior to first fill of the medication to determine the appropriateness of therapy given the patient's diagnosis, medication list, comorbidities, allergies, medical history, patient's ability to self administer medication, and therapeutic goals based on possible outcomes of therapy. Refer to initial assessment task completed on 2/18/25.    Labs/Procedures for clinical appropriateness that were reviewed include:   Rheumatology- CBC-diff:   Lab Results   Component Value Date    WBC 10.5 02/12/2025    RBC 4.46 02/12/2025    HGB 12.6 (L) 02/12/2025    HCT 39.0 02/12/2025    MCV 87.4 02/12/2025    MCHC 32.3 02/12/2025     (H) 02/12/2025    RDW 13.5 02/12/2025    NEUTOPHILPCT 52.6 12/30/2024    IGPCT 1.3 (H) 12/30/2024    LYMPHOPCT 18.4 02/12/2025    MONOPCT 12.0 02/12/2025    EOSPCT 4.9 02/12/2025    BASOPCT 1.1 02/12/2025    NEUTROABS 4.89 12/30/2024    LYMPHSABS 2.01 12/30/2024    MONOSABS 1.08 (H) 12/30/2024    EOSABS 515 (H) 02/12/2025    BASOSABS 116 02/12/2025   , CMP:   Lab Results   Component Value Date    GLUCOSE 86 02/12/2025     02/12/2025    K 4.4 02/12/2025     02/12/2025    CO2 26 02/12/2025    ANIONGAP 10 02/12/2025    BUN 26 (H) 02/12/2025    CREATININE 1.20 02/12/2025    CALCIUM 9.5 02/12/2025    ALBUMIN 3.9 02/12/2025    ALKPHOS 67 02/12/2025    PROT 6.5 02/12/2025    AST 15  "02/12/2025    BILITOT 0.4 02/12/2025    ALT 12 02/12/2025   , TB:   Lab Results   Component Value Date    TBSIN Negative 02/12/2025   , Hepatitis B panel:   Lab Results   Component Value Date    HEPBCAB NON-REACTIVE 02/12/2025    HEPBSAG NON-REACTIVE 02/12/2025    HEPBSAB <5 (L) 02/12/2025   , and Hepatitis C antibody: No results found for: \"HEPCABINIT\", \"HEPCINTERP\", \"HEPCAB\"    Education/Discussion  Maurice was contacted on 2/26/2025 at 1:25 PM for a pharmacy visit with encounter number [phone] from:   [facility]  [address]  [place] [address]  Dept: [phone]  Dept Fax: [phone]  Loc: [phone]  Maurice consented to a/an Telephone visit, which was performed.    Medication Start Date (planned or actual): 2/26/25  Education was conducted prior to start of therapy? No - patient injected first dose this morning 2/26/25. No issues    Education discussed includes the following:  Patient Education  Counseled the Patient on the Following : Theraputic rationale and expected outcomes, Expected duration of therapy, Adherence and missed doses, Doses and administration, Possible side effects and management, Lab monitoring and follow-up, Contraindications and precautions, Associated vaccinations, Pharmacy contact information  Learner: Patient  Education Method: Explanation  Education Response: Verbalizes understanding  Additional details of the medication specific counseling are found within the linked patient education flowsheet.     The follow up timeline was discussed. Every person responds to and reacts to therapy differently. Patient should be assessed for efficacy and tolerability in approximately: 6 months    Provided education on goals and possible outcomes of therapy:  Adherence with therapy  Timely completion of appropriate labs  Timely and appropriate follow up with provider  Identify and address medication interactions with " presciption medications, OTC medications and supplements  Optimize or maintain quality of life  Rheumatology: Remission or low disease activity     The importance of adherence was discussed and they were advised to take the medication as prescribed by their provider.     Impression/Plan       This patient has been identified as high risk due to Geriatric (over 65 years of age).  The following action was taken: Patient/caregiver encouraged to participate in patient management program.    QOL/Patient Satisfaction  Rate your quality of life on scale of 1-10: 7  Rate your satisfaction with  Specialty Pharmacy on scale of 1-10: 10 - Completely satisfied    The  Specialty Pharmacy Welcome packet may be viewed here:   Specialty Pharmacy Welcome Packet     Or by scanning QR code:      Provided contact information ([phone]) for [facility] and reviewed dispensing process, refill timeline and patient management follow up. Advised to contact the pharmacy if there are any adverse effects and/or changes to medication list, including prescriptions, OTC medications, herbal products, or supplements. Confirmed understanding of education conducted during assessment. All questions and concerns were addressed and patient was encouraged to reach out for additional questions or concerns.      Damaris A Weiland, PharmD

## 2025-02-27 ENCOUNTER — APPOINTMENT (OUTPATIENT)
Facility: CLINIC | Age: 85
End: 2025-02-27
Payer: MEDICARE

## 2025-02-27 DIAGNOSIS — R91.8 LUNG NODULES: Primary | ICD-10-CM

## 2025-02-27 PROCEDURE — 1159F MED LIST DOCD IN RCRD: CPT | Performed by: INTERNAL MEDICINE

## 2025-02-27 PROCEDURE — G8433 SCR FOR DEP NOT CPT DOC RSN: HCPCS | Performed by: INTERNAL MEDICINE

## 2025-02-27 PROCEDURE — 99213 OFFICE O/P EST LOW 20 MIN: CPT | Performed by: INTERNAL MEDICINE

## 2025-02-27 PROCEDURE — 1157F ADVNC CARE PLAN IN RCRD: CPT | Performed by: INTERNAL MEDICINE

## 2025-02-27 RX ORDER — ADALIMUMAB 40MG/0.4ML
40 KIT SUBCUTANEOUS
Qty: 2 EACH | Refills: 2 | Status: SHIPPED | OUTPATIENT
Start: 2025-02-27

## 2025-02-27 ASSESSMENT — ENCOUNTER SYMPTOMS
OCCASIONAL FEELINGS OF UNSTEADINESS: 1
LOSS OF SENSATION IN FEET: 0
DEPRESSION: 0

## 2025-03-02 NOTE — PROGRESS NOTES
Department of Medicine  Division of Pulmonary, Critical Care, and Sleep Medicine  Follow-Up Visit  [facility] - [address], [address]    Physician HPI:  Mr. Tripp is a pleasant 83-year-old man with past medical history significant for rheumatoid arthritis on leflunomide and hydroxychloroquine who presented to the office today regarding multiple pulmonary nodules.  Maurice has no acute respiratory symptoms today.  I reviewed his most recent CT scan of chest that was done in July 2023, previous CT scan in March and January 2023 and his most recent PET/CT scan. Multiple pulmonary nodules noted the largest is 1.7 cm in size. Nodules are solid and noncalcified. It did show minimal uptake on the PET CT scan. Underwent CT-guided biopsy previously which revealed fibro inflammatory changes with few lymphoplasmacytic inflammation that were negative for IgG stain.         Follow up 1/17/2024:  Respiratory status has been stable since last visit.   Repeated CT scan of chest revealed interval increase in the size of the previously noted pulmonary nodules. In addition, a few of the nodules are now cavitary. The patient denies acute cough, fevers, chills or night sweats since last visit. No acute chest pain or hemoptysis. No interval hx of RA flares up.     Follow up 03/18/2024:  Maurice presented today for follow up.   Reports intermittent productive cough, sputum light yellowish. Feels that cough has overall improved. No acute chest pain, fevers, or hemoptysis. + post nasal drip.     He is s/p percutaneous biopsy of the RUL nodule. Pathology revealed organizing pneumonia with chronic inflammation and focal area of necrosis.  Microbiology has been unremarkable.    Follow up 02/04/2025:  CT scan of chest is scheduled next week.  No acute respiratory symptoms today.  He reports persistent inflammatory arthritis and stiffness in the morning despite leflunomide.  He has an appointment with rheumatology next week.    Immunization  History   Administered Date(s) Administered    Flu vaccine, quadrivalent, high-dose, preservative free, age 65y+ (FLUZONE) 09/16/2022    Flu vaccine, quadrivalent, no egg protein, age 6 month or greater (FLUCELVAX) 08/13/2019    Flu vaccine, trivalent, preservative free, HIGH-DOSE, age 65y+ (Fluzone) 09/05/2016, 08/16/2017, 08/09/2018, 08/26/2020    Influenza, Unspecified 09/07/2021    Influenza, seasonal, injectable 09/09/2015, 01/01/2016, 09/01/2017, 10/08/2023    Influenza, trivalent, adjuvanted 09/05/2019    Moderna COVID-19 vaccine, 12 years and older (50mcg/0.5mL)(Spikevax) 09/09/2024    Moderna COVID-19 vaccine, bivalent, blue cap/gray label *Check age/dose* 10/02/2022    Moderna SARS-CoV-2 Vaccination 02/16/2021, 03/17/2021, 10/27/2021, 04/12/2022, 10/08/2023    PPD Test 02/07/2020    Pneumococcal conjugate vaccine, 13-valent (PREVNAR 13) 03/25/2015    Pneumococcal polysaccharide vaccine, 23-valent, age 2 years and older (PNEUMOVAX 23) 01/01/2016, 07/17/2018    Td vaccine, age 7 years and older (TENIVAC) 05/14/2008    Tdap vaccine, age 7 year and older (BOOSTRIX, ADACEL) 04/18/2019    Zoster vaccine, recombinant, adult (SHINGRIX) 04/02/2019    Zoster, live 02/27/2008       Current Outpatient Medications   Medication Instructions    acetaminophen (Tylenol) 500 mg tablet 2 tablets, 2 times daily    cetirizine (ZyrTEC) 10 mg tablet 1 tablet, Daily PRN    citalopram (CELEXA) 20 mg, oral, Daily    fluticasone (Flonase) 50 mcg/actuation nasal spray 1 spray, Each Nostril, Daily, Shake gently. Before first use, prime pump. After use, clean tip and replace cap.    gabapentin (NEURONTIN) 300 mg, oral, Nightly    Humira(CF) Pen 40 mg, subcutaneous, Every 14 days    hydroxychloroquine (Plaquenil) 200 mg tablet oral, Daily    losartan (Cozaar) 100 mg tablet oral, Daily    lovastatin (MEVACOR) 20 mg, oral, Daily, as directed    omega-3 acid ethyl esters (LOVAZA) 1 g, Daily    omeprazole (PRILOSEC) 10 mg, oral, Daily     "orphenadrine (NORFLEX) 100 mg, oral, 2 times daily PRN, Do not crush, chew, or split.    vit A/vit C/vit E/zinc/copper (PRESERVISION AREDS ORAL) 1 capsule, Daily        Allergies:  No Known Allergies       Visit Vitals  /72   Pulse 81   Temp 36.3 °C (97.3 °F)   Resp 18   Ht 1.854 m (6' 1\")   Wt 109 kg (241 lb 6.4 oz)   SpO2 93%   BMI 31.85 kg/m²   Smoking Status Former   BSA 2.37 m²      Physical Exam     Constitutional: Alert and in no acute distress. Well developed, well nourished.   Ears, Nose, Mouth, and Throat: External inspection of ears and nose: Normal.    Pulmonary: Chest: Normal to inspection. ~Respiratory effort: No increased work of breathing or signs of respiratory distress. ~Auscultation of lungs: Clear to auscultation bilaterally.    Cardiovascular: Heart rate and rhythm were normal, normal S1 and S2, no gallops, no murmurs and no pericardial rub.   Skin: Normal skin color and pigmentation, normal skin turgor, and no rash.   Psychiatric: Judgment and insight: Intact.~Alert and oriented to person, place and time.~Mood and affect: Normal.     Chest Radiograph     XR chest 1 view 01/21/2024    Narrative  STUDY:  Chest Radiograph;  1/21/2024 at 6:20 PM.  INDICATION:  Chest pain.  COMPARISON:  CT chest 1/10/2024, 7/10/2023, 3/22/2023; XR chest 10/14/2021  ACCESSION NUMBER(S):  [id number]  ORDERING CLINICIAN:  CHINO BRADY  TECHNIQUE:  Frontal chest was obtained at 18:20 hours.  FINDINGS:  CARDIOMEDIASTINAL SILHOUETTE:  Cardiomediastinal silhouette is normal in size and configuration.    LUNGS:  Bilaterally upper lobe apical nodules again noted, as well as in the  right lower lobe.  This corresponds with recent CT exam.    ABDOMEN:  No remarkable upper abdominal findings.    BONES:  No acute osseous changes.    Impression  Multiple lung nodules, no focal infiltrates seen.  Signed by Britton Kumar MD      XR CHEST 1 VIEW 02/10/2023    Narrative  MRN: [id number]  Patient Name: NELSON, " YI    STUDY:  CHEST 1 VIEW;  2/10/2023 1:49 pm    INDICATION:  bilateral lung nodules. now s/p YOSI nodule bx. eval for PTX .    COMPARISON:  Chest radiograph 2/10/2023 10:51 a.m.    ACCESSION NUMBER(S):  [id number]    ORDERING CLINICIAN:  ISHAN CEJA    FINDINGS:  PA and lateral radiographs of the chest were provided.  Additional PA  dual energy images were also provided.        CARDIOMEDIASTINAL SILHOUETTE:  Cardiomediastinal silhouette is normal in size and configuration.    LUNGS:  No pneumothorax. No consolidation, pulmonary edema, or pleural  effusion.    ABDOMEN:  No remarkable upper abdominal findings.    BONES:  No acute osseous changes.    Impression  No sizable pneumothorax status post recent biopsy.    I personally reviewed the images/study and I agree with the findings  as stated by radiology resident Kristal Strauss MD. This study was  interpreted at [place], [place].      Echocardiogram     Echocardiogram     [facility]  [address] [id number]  Tel [phone] Fax [phone]    TRANSTHORACIC ECHOCARDIOGRAM REPORT      Patient Name:     YI DAMON Reading Physician:   [id number] Jaky Hoyos MD  Study Date:       7/13/2023      Referring Physician: JAKY HOYOS  MRN/PID:          [id number]       PCP:  Accession/Order#: [id number]   Department Location: [facility] Echo Lab  YOB: 1940       Fellow:  Gender:           M              Nurse:  Admit Date:                      Sonographer:         Mikki Luque [facility]  Admission Status: Outpatient     Additional Staff:  Height:           185.42 cm      CC Report to:  Weight:           108.41 kg      Study Type:          Echocardiogram  BSA:              2.32 m2  Blood Pressure: 120 /68 mmHg    Diagnosis/ICD: R06.09-Other forms of dyspnea  Indication:    Dyspnea  Procedure/CPT: Echo Complete w Full Doppler-[id number]  Study Detail: The following Echo studies  were performed: 2D, M-Mode, color flow  and Doppler. Definity used as a contrast agent for endocardial  border definition. Total contrast used for this procedure was 3 mL  via IV push.      PHYSICIAN INTERPRETATION:  Left Ventricle: Left ventricular systolic function is normal, with an estimated ejection fraction of 55-60%. There are no regional wall motion abnormalities. The left ventricular cavity size is normal. Spectral Doppler shows an impaired relaxation pattern of left ventricular diastolic filling.  Left Atrium: The left atrium is normal in size.  Right Ventricle: The right ventricle is normal in size. There is normal right ventricular global systolic function.  Right Atrium: The right atrium is normal in size.  Aortic Valve: The aortic valve appears structurally normal. There is mild aortic valve regurgitation. The peak instantaneous gradient of the aortic valve is 7.4 mmHg. The mean gradient of the aortic valve is 4.0 mmHg.  Mitral Valve: The mitral valve is normal in structure. There is no evidence of mitral valve regurgitation.  Tricuspid Valve: The tricuspid valve is structurally normal. There is trace tricuspid regurgitation. The estimated RVSP is 29 mm.  Pulmonic Valve: The pulmonic valve is structurally normal. There is trace pulmonic valve regurgitation.  Pericardium: There is no pericardial effusion noted.  Aorta: The aortic root is normal.      CONCLUSIONS:  1. Left ventricular systolic function is normal with a 55-60% estimated ejection fraction.  2. Spectral Doppler shows an impaired relaxation pattern of left ventricular diastolic filling.  3. The estimated RVSP is 29 mm.  4. Mild aortic valve regurgitation.    QUANTITATIVE DATA SUMMARY:  2D MEASUREMENTS:  Normal Ranges:  LAs:           4.30 cm   (2.7-4.0cm)  IVSd:          0.98 cm   (0.6-1.1cm)  LVPWd:         0.80 cm   (0.6-1.1cm)  LVIDd:         5.40 cm   (3.9-5.9cm)  LVIDs:         4.22 cm  LV Mass Index: 76.5 g/m2  LV % FS        21.9  %    LA VOLUME:  Normal Ranges:  LA Vol A4C:        41.2 ml    (22+/-6mL/m2)  LA Vol A2C:        43.6 ml  LA Vol BP:         42.5 ml  LA Vol Index A4C:  17.8ml/m2  LA Vol Index A2C:  18.8 ml/m2  LA Vol Index BP:   18.3 ml/m2  LA Area A4C:       16.2 cm2  LA Area A2C:       16.7 cm2  LA Major Axis A4C: 5.4 cm  LA Major Axis A2C: 5.4 cm  LA Volume Index:   16.8 ml/m2  LA Vol A4C:        37.0 ml  LA Vol A2C:        42.0 ml    AORTA MEASUREMENTS:  Normal Ranges:  Asc Ao, d: 3.75 cm (2.1-3.4cm)    LV SYSTOLIC FUNCTION BY 2D PLANIMETRY (MOD):  Normal Ranges:  EF-A4C View: 55.3 % (>=55%)  EF-A2C View: 55.6 %  EF-Biplane:  54.9 %    LV DIASTOLIC FUNCTION:  Normal Ranges:  MV Peak E:    0.49 m/s (0.7-1.2 m/s)  MV Peak A:    0.69 m/s (0.42-0.7 m/s)  E/A Ratio:    0.71     (1.0-2.2)  MV e'         0.07 m/s (>8.0)  MV lateral e' 0.09 m/s  MV medial e'  0.05 m/s  E/e' Ratio:   7.02     (<8.0)    MITRAL VALVE:  Normal Ranges:  MV DT: 289 msec (150-240msec)    AORTIC VALVE:  Normal Ranges:  AoV Vmax:                1.36 m/s (<=1.7m/s)  AoV Peak P.4 mmHg (<20mmHg)  AoV Mean P.0 mmHg (1.7-11.5mmHg)  LVOT Max Juaquin:            0.89 m/s (<=1.1m/s)  AoV VTI:                 25.90 cm (18-25cm)  LVOT VTI:                18.20 cm  LVOT Diameter:           2.00 cm  (1.8-2.4cm)  AoV Area, VTI:           2.21 cm2 (2.5-5.5cm2)  AoV Area,Vmax:           2.05 cm2 (2.5-4.5cm2)  AoV Dimensionless Index: 0.70    AORTIC INSUFFICIENCY:  AI Vmax:       4.15 m/s  AI Half-time:  652 msec  AI Decel Rate: 197.50 cm/s2      RIGHT VENTRICLE:  RV Basal 3.12 cm  RV Mid   2.13 cm  RV Major 7.3 cm  TAPSE:   23.3 mm  RV s'    0.11 m/s    TRICUSPID VALVE/RVSP:  Normal Ranges:  Peak TR Velocity: 1.98 m/s  RV Syst Pressure: 18.7 mmHg (< 30mmHg)      [id number] Bull Balderas MD  Electronically signed on 2023 at 6:55:35 PM       Chest CT Scan     CT chest wo IV contrast 01/10/2024    Narrative  Interpreted By:  Sandro Mayer,  STUDY:  CT  CHEST WO IV CONTRAST;  1/10/2024 9:38 am    INDICATION:  Signs/Symptoms: multiple lung nodules - 6 months follow up  R91.8:  Lung nodules.    COMPARISON:  07/10/2023    ACCESSION NUMBER(S):  [id number]    ORDERING CLINICIAN:  TERESSA RODGERS    TECHNIQUE:  Helical data acquisition of the chest was obtained without the use of  IV contrast. Images were reformatted in axial, coronal, and sagittal  planes.    FINDINGS:  POTENTIAL LIMITATIONS OF THE STUDY:   Lack of IV contrast    HEART AND VESSELS:    There are atherosclerotic calcifications of the aorta and its  branches. The aorta is unchanged in course and caliber.    The heart is not significantly enlarged.    No pericardial effusion is seen.    MEDIASTINUM AND JOE, LOWER NECK AND AXILLA:  The visualized thyroid gland is within normal limits.    No evidence of thoracic lymphadenopathy by CT criteria.    Esophagus appears within normal limits as seen.    LUNGS AND AIRWAYS:  The trachea and central airways are patent. No endobronchial lesion.    There are numerous nodules scattered throughout the lungs which have  increased in size when compared to the previous examination. In  addition, a few of the nodules are now cavitary. A right upper lobe  nodule which measures up to 1.5 cm on today's study measured  approximately 1.3 cm on the previous study, image 72. Cavitary nodule  in the left upper lobe which measures 1.8 cm on today's exam measured  approximately 1.6 cm on the previous study, image 68. 9 mm nodule in  the right lower lobe measured 7 mm on the previous study, image 154.  The remaining nodules are either increased in size or are stable in  size. No definite new nodules are identified. No effusion. No  pneumothorax. Mild scattered areas of atelectasis/scarring.    UPPER ABDOMEN:  The visualized subdiaphragmatic structures demonstrate no acute  abnormality.    CHEST WALL AND OSSEOUS STRUCTURES:  Degenerative changes. No acute  process.    Impression  Interval increase in the size of the previously noted pulmonary  nodules. In addition, a few of the nodules are now cavitary. Findings  may relate to progression of metastatic disease or possibly  infectious/inflammatory process. Correlate with biopsy results.    MACRO:  None.    Signed by: Charito Mayer 1/11/2024 4:25 PM  Dictation workstation:   [id number]      CT chest wo IV contrast 07/10/2023    Narrative  Interpreted By:  CHARITO MAYER MD  MRN: [id number]  Patient Name: YI DAMON    STUDY:  CT CHEST WO CONTRAST;  7/10/2023 4:57 pm    INDICATION:  lung nodules  R91.8: Multiple pulmonary nodules determined by  computed tomography of lung.    COMPARISON:  03/22/2023    ACCESSION NUMBER(S):  [id number]    ORDERING CLINICIAN:  ANNIE ROSADO    TECHNIQUE:  Helical data acquisition of the chest was obtained without the use of  IV contrast. Images were reformatted in axial, coronal, and sagittal  planes.    FINDINGS:  POTENTIAL LIMITATIONS OF THE STUDY:   Lack of IV contrast    HEART AND VESSELS:    There are atherosclerotic calcifications of the aorta and its  branches. Aorta is unchanged in course and caliber.    The heart is unchanged in size.    No pericardial effusion is seen.    MEDIASTINUM AND JOE, LOWER NECK AND AXILLA:  The visualized thyroid gland is within normal limits.    No evidence of thoracic lymphadenopathy by CT criteria.    Esophagus is stable in course and caliber. Small hiatal hernia.    LUNGS AND AIRWAYS:  The trachea and central airways are patent. No endobronchial lesion.    The patient's known pulmonary nodules are again identified and are  not significantly changed in size, measuring up to 1.7 Cm. For  example, images 62, 58, 168, 191, 215 and 201 of 293. No new  suspicious pulmonary nodule or mass. No consolidation. No evidence of  an effusion or pneumothorax. There are mild scattered areas of  atelectasis/scarring.    UPPER ABDOMEN:  The visualized subdiaphragmatic  structures demonstrate no acute  abnormality. Stable subcentimeter hypoattenuating lesion in the right  hepatic lobe which is statistically most likely a cyst but is too  small to characterize. Left renal cyst is again partially  included/imaged.    CHEST WALL AND OSSEOUS STRUCTURES:  Degenerative changes. No acute process.    Impression  Stable scattered pulmonary nodules measuring 1.7 cm or less in size.  No new suspicious pulmonary nodule or mass. Continued surveillance  recommended as per Fleischner society guidelines.       Laboratory Studies     Lab Results   Component Value Date    WBC 10.5 02/12/2025    HGB 12.6 (L) 02/12/2025    HCT 39.0 02/12/2025    MCV 87.4 02/12/2025     (H) 02/12/2025      Lab Results   Component Value Date    GLUCOSE 86 02/12/2025    CALCIUM 9.5 02/12/2025     02/12/2025    K 4.4 02/12/2025    CO2 26 02/12/2025     02/12/2025    BUN 26 (H) 02/12/2025    CREATININE 1.20 02/12/2025      Lab Results   Component Value Date    ALT 12 02/12/2025    AST 15 02/12/2025    ALKPHOS 67 02/12/2025    BILITOT 0.4 02/12/2025        Protime   Date/Time Value Ref Range Status   02/06/2024 08:09 AM 11.9 9.8 - 12.8 seconds Final     INR   Date/Time Value Ref Range Status   02/06/2024 08:09 AM 1.1 0.9 - 1.1 Final           Assessment and Plan / Recommendations     1. Multiple pulmonary nodules:  Most likely related to rheumatoid arthritis.  PET/CT scan earlier last year revealed minimal metabolic activity indicating likely a benign etiology. S/P CT guided biopsy in Feb 2023 that was non-diagnostic.  Repeated percutaneous biopsy of the right upper lobe nodule revealed chronic inflammation with organizing pneumonia.  Fungal and AFB cultures are negative to date.  Repeated CT scan of chest is scheduled.     2. RA:  On leflunomide.  Given his persistent symptoms and multiple lung nodules, will discuss with rheumatology if different regimen is indicated at this time.      Follow-up based on  CT scan result.     Please excuse any misspellings or unintended errors related to the Dragon speech recognition software used to dictate this note.         Obed Camarena MD   02/04/2025

## 2025-03-06 ENCOUNTER — OFFICE VISIT (OUTPATIENT)
Dept: SLEEP MEDICINE | Facility: CLINIC | Age: 85
End: 2025-03-06
Payer: MEDICARE

## 2025-03-06 VITALS
HEIGHT: 73 IN | OXYGEN SATURATION: 94 % | BODY MASS INDEX: 30.48 KG/M2 | SYSTOLIC BLOOD PRESSURE: 119 MMHG | HEART RATE: 78 BPM | DIASTOLIC BLOOD PRESSURE: 78 MMHG | WEIGHT: 230 LBS

## 2025-03-06 DIAGNOSIS — G47.33 OBSTRUCTIVE SLEEP APNEA: ICD-10-CM

## 2025-03-06 DIAGNOSIS — E83.10 DISORDER OF IRON METABOLISM: Primary | ICD-10-CM

## 2025-03-06 PROCEDURE — 3074F SYST BP LT 130 MM HG: CPT | Performed by: PHYSICIAN ASSISTANT

## 2025-03-06 PROCEDURE — 99213 OFFICE O/P EST LOW 20 MIN: CPT | Performed by: PHYSICIAN ASSISTANT

## 2025-03-06 PROCEDURE — 3078F DIAST BP <80 MM HG: CPT | Performed by: PHYSICIAN ASSISTANT

## 2025-03-06 PROCEDURE — 1157F ADVNC CARE PLAN IN RCRD: CPT | Performed by: PHYSICIAN ASSISTANT

## 2025-03-06 PROCEDURE — G2211 COMPLEX E/M VISIT ADD ON: HCPCS | Performed by: PHYSICIAN ASSISTANT

## 2025-03-06 PROCEDURE — 1159F MED LIST DOCD IN RCRD: CPT | Performed by: PHYSICIAN ASSISTANT

## 2025-03-06 NOTE — ASSESSMENT & PLAN NOTE
-experiencing good benefit from pap therapy  -update supply order with [facility]  -avoid drowsy driving

## 2025-03-06 NOTE — PATIENT INSTRUCTIONS
[facility]  [facility] [address] [facility]  [address] [place] [address]       NAME: Maurice Tripp   DATE: 03/06/25    Your Sleep Provider Today: Hilaria Concepcion PA-C  Your Primary Care Physician: Toney Almeida, DO   Your Referring Provider: No ref. provider found    DIAGNOSIS:   1. Disorder of iron metabolism  Iron and TIBC    Iron and TIBC          Thank you for coming to the Sleep Medicine Clinic today! Your sleep medicine provider today was: Hilaria Concepcion PA-C Below is a summary of your treatment plan, other important information, and our contact numbers:      TREATMENT PLAN   Soni Story NP or Dr. Sawant both are [facility] sleep providers  I am at  [facility] [place]  Schedule [phone]       Return to clinic 1 year       IMPORTANT INFORMATION     Call [phone] for medical emergencies.  Our offices are generally open from Monday-Friday, 9 am - 5 pm.  If you need to get in touch with me, you may either call me and my team(number is below) or you can use [employer].  If a referral for a test, for CPAP, or for another specialist was made, and you have not heard about scheduling this within a week, please call scheduling at [phone]).  If you are unable to make your appointment for clinic or an overnight study, kindly call the office at least 48 hours in advance to cancel and reschedule.  If you are on CPAP, please bring your device's card or the device to each clinic appointment.   There are no supporting services by either the sleep doctors or their staff on weekends and Holidays, or after 5 PM on weekdays.   If you have been asked to come to a sleep study, make sure you bring toiletries, a comfy pillow, and any nighttime medications that you may regularly take. Also be sure to eat dinner before you arrive. We generally do not provide meals.      PRESCRIPTIONS     We require 7 days advanced notice for prescription refills. If we do not  receive the request in this time, we cannot guarantee that your medication will be refilled in time.      IMPORTANT PHONE NUMBERS     Sleep Medicine Clinic Fax: [phone]  Appointments (for Adult Sleep Clinic): [phone] - option 2  Appointments (For Sleep Studies): [phone] - option 3  [facility] [facility] (DME): [phone]  Behavioral Sleep Medicine: [phone]  Sleep Surgery: [phone]  ENT (Otolaryngology): [phone]  Headache Clinic (Neurology): [phone]  Neurology: [phone]  Psychiatry: [phone]  Pulmonary Function Testing (PFT) Center: [phone]  Pulmonary Medicine: [phone]    [facility] [facility] (DME): [phone]  [employer] (DME): [phone]  [facility] ([place]): [phone]      OUR ADULT SLEEP MEDICINE TEAM   Please do not hesitate to call the office or sleep nurse with any questions between appointments:    Adult Sleep Nurses (Emmie Yusuf, RN and Margaret Good RN):  For clinical questions and refilling prescriptions: [phone]  Email sleep diaries and other documents at: [email or web address]    Adult Sleep Medicine Secretaries:  Rosita Sanford (For Gala/Her/Krise/Strohl/Yeh/Quiroz):   P: [phone]  F: [phone]  Analia Garcia (For Goncalves/Guggenbiller): P: [phone]  Fax: [phone]  Fallon Borjas (For Jurcevic/Blank): P: [phone]  F: [phone]  Nicole Prado (For Katherin): P: [phone]  F: [phone]  Ashley Paredes (For Susan/Porsha/Zakhary): P: [phone]  F: [phone]  Rosaline Gayle (For Ocampo/Avitia): P: [phone]  F: [phone]     Adult Sleep Medicine Advanced Practice Providers:  Lico Amaya ([place], [place])  Soni Story ([facility])  Nati Velasquez CNP ([facility])  Shikha Concepcion CNP ([place], [place], [place])  Mary Avitia CNP ([facility])        OUR SLEEP TESTING LOCATIONS     Our team will contact  "you to schedule your sleep study, however, you can contact us as follow:  Main Phone Line (scheduling only): [phone]), option 3  Adult and Pediatric Locations   [place] (6 years and older): [facility] - 4th floor ([address] [facility], [facility]) After hours line: [phone]  [facility] (Main campus: All ages): [facility], 6th floor. After hours line: [phone]   [place] (5 years and older; younger considered on case-by-case basis): [address]; [address], [address]. Scheduling  After hours line: [phone]   [place] (6 years and older): [address]; [address]; [address]   [place] (6 years and older): [address] [facility], [address]  After hours line: [phone]   [place] (13 years and older) in [place]: [address], 2nd floor  After hours line: [phone]   [place] (13 year and older): [address]  After hours line: [phone]     Adult Only Locations:   [place] (18 years and older): [address] [facility], 2nd floor   [place] (18 years and older): [address] [facility]; 4th floor  After hours line: [phone]  [facility] (18 years and older) at [place]: [address] [place] [address]  After hours line: [phone]          CONTACTING YOUR SLEEP MEDICINE PROVIDER     Send a message directly to your provider through \"My Chart\", which is the email service through your  Records Account: [email or web address]   Call [phone] and leave a message. One of the administrative assistants will forward the message to your sleep medicine provider through \"My Chart\" and/or email.     Your sleep medicine provider for this visit was: Hilaria Concepcion PA-C    "

## 2025-03-06 NOTE — PROGRESS NOTES
Patient: Maurice Tripp    [id number]  : 1940 -- AGE 84 y.o.    Provider: Hilaria Concepcion PA-C     Location [facility]   Service Date: 3/6/2025              [facility]  Followup Visit Note    HISTORY OF PRESENT ILLNESS     HISTORY OF PRESENT ILLNESS   Maurice Tripp is a 84 y.o. male with h/o SHA, RA, anxiety, BPH, multiple comorbidities who presents to a [facility] for followup.     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 Had some RA flare up last month where he could barely move his hands and due to this had lower compliance during that time; has now worked back up to compliance and usign regularly. Wife present, feels she notices that he has better energy when he uses the device. Not snoring with device on.     States RLS better controlled, also on Gabapentin now and iron supplement     Sleep schedule:   Bed time: 1011P  Sleep latency: quickly   Nocturnal Awakenings:4-5 - nocturia   Wake up: 4:30A; may get up and doze back off in his chair  TST: 6 overnight plus another 1-2 hours of naps    Naps:  AM dozing, may nap again in the afternoon for another 1-2 hours       ESS:  6  SAJAN:  7  FOSQ: 37    REVIEW OF SYSTEMS     REVIEW OF SYSTEMS  See HPI; all other ROS were reviewed and negative for compliant      ALLERGIES AND MEDICATIONS     ALLERGIES  No Known Allergies    MEDICATIONS: He has a current medication  list which includes the following prescription(s): acetaminophen - Take 2 tablets (1,000 mg) by mouth 2 times a day, cetirizine - Take 1 tablet (10 mg) by mouth once daily as needed for allergies, citalopram - TAKE 1 TABLET DAILY, fluticasone - Administer 1 spray into each nostril once daily. Shake gently. Before first use, prime pump. After use, clean tip and replace cap, gabapentin - Take 1 capsule (300 mg) by mouth once daily at bedtime, humira(cf) pen - Inject 1 Pen (40 mg) under the skin every 14 (fourteen) days, hydroxychloroquine - TAKE 2 TABLETS ONCE DAILY, losartan - TAKE ONE-HALF (1/2) TABLET DAILY, lovastatin - TAKE 1 TABLET DAILY AS DIRECTED, omega-3 acid ethyl esters - Take 1 capsule (1 g) by mouth once daily, omeprazole - TAKE 1 CAPSULE DAILY, orphenadrine - Take 1 tablet (100 mg) by mouth 2 times a day as needed for muscle spasms for up to 10 days. Do not crush, chew, or split, and vit a/vit c/vit e/zinc/copper - Take 1 capsule by mouth once daily.    PAST MEDICAL HISTORY : He  has a past medical history of Acute bronchitis (05/12/2023), Acute non-recurrent maxillary sinusitis (05/12/2023), Alcohol dependence (05/12/2023), BRBPR (bright red blood per rectum) (05/12/2023), Fungal nail infection (05/12/2023), Right cervical radiculopathy (05/12/2023), and Subacute cough (05/12/2023).    PAST SURGICAL HISTORY: He  has a past surgical history that includes CT guided percutaneous biopsy lung (2/10/2023).     FAMILY HISTORY: No changes since previous visit. Otherwise non-contributory as charted.     SOCIAL HISTORY  He  reports that he quit smoking about 40 years ago. His smoking use included cigarettes. He has never used smokeless tobacco. He reports that he does not currently use alcohol. He reports that he does not use drugs.       PHYSICAL EXAM     VITAL SIGNS: There were no vitals taken for this visit.       PREVIOUS WEIGHTS:  Wt Readings from Last 3 Encounters:   02/12/25 108 kg (237 lb)   02/04/25 109  kg (241 lb 6.4 oz)   01/27/25 108 kg (238 lb 8 oz)       Constitutional: Alert and oriented, cooperative, no acute distress  Head: Normocephalic, atraumatic   Cranial Features: No abnormal craniofacial features  Neck: Supple. Trachea midline.  Pulmonary: Non-labored breathing, speaks in full sentences. No cough.    Cardiac: regular rate   Extremities: No clubbing, no edema  Neuromuscular: Cranial nerves grossly intact, no focal deficits      RESULTS/DATA     CARBON DIOXIDE (mmol/L)   Date Value   02/12/2025 26     Bicarbonate (mmol/L)   Date Value   12/30/2024 24   09/13/2024 21   09/13/2024 17 (L)     Iron (ug/dL)   Date Value   05/10/2024 63     % Saturation (%)   Date Value   05/10/2024 22 (L)     TIBC (ug/dL)   Date Value   05/10/2024 284     FERRITIN (ng/mL)   Date Value   01/27/2025 477 (H)     Ferritin (ng/mL)   Date Value   05/10/2024 513 (H)       PAP Adherence  PAP Download reviewed?: A PAP adherence download was obtained and data was reviewed personally today in clinic.      ASSESSMENT/PLAN     Mr. Tripp is a 84 y.o. male and he returns in followup to the [facility] for SHA.    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

## 2025-03-12 NOTE — PROGRESS NOTES
Department of Medicine  Division of Pulmonary, Critical Care, and Sleep Medicine  Follow-Up Visit  [facility] - [address], [address]    Physician HPI:  Mr. Tripp is a pleasant 83-year-old man with past medical history significant for rheumatoid arthritis on leflunomide and hydroxychloroquine who presented to the office today regarding multiple pulmonary nodules.  Maurice has no acute respiratory symptoms today.  I reviewed his most recent CT scan of chest that was done in July 2023, previous CT scan in March and January 2023 and his most recent PET/CT scan. Multiple pulmonary nodules noted the largest is 1.7 cm in size. Nodules are solid and noncalcified. It did show minimal uptake on the PET CT scan. Underwent CT-guided biopsy previously which revealed fibro inflammatory changes with few lymphoplasmacytic inflammation that were negative for IgG stain.         Follow up 1/17/2024:  Respiratory status has been stable since last visit.   Repeated CT scan of chest revealed interval increase in the size of the previously noted pulmonary nodules. In addition, a few of the nodules are now cavitary. The patient denies acute cough, fevers, chills or night sweats since last visit. No acute chest pain or hemoptysis. No interval hx of RA flares up.     Follow up 03/18/2024:  Maurice presented today for follow up.   Reports intermittent productive cough, sputum light yellowish. Feels that cough has overall improved. No acute chest pain, fevers, or hemoptysis. + post nasal drip.     He is s/p percutaneous biopsy of the RUL nodule. Pathology revealed organizing pneumonia with chronic inflammation and focal area of necrosis.  Microbiology has been unremarkable.    Follow up 02/04/2025:  CT scan of chest is scheduled next week.  No acute respiratory symptoms today.  He reports persistent inflammatory arthritis and stiffness in the morning despite leflunomide.  He has an appointment with rheumatology next week.    Follow up (phone)  02/27/2025:  CT scan of chest is reviewed and discussed with Maurice over the phone today. Stable lung nodules when compared with most recent scan. Will repeat CT in 6 months.     Immunization History   Administered Date(s) Administered    Flu vaccine, quadrivalent, high-dose, preservative free, age 65y+ (FLUZONE) 09/16/2022    Flu vaccine, quadrivalent, no egg protein, age 6 month or greater (FLUCELVAX) 08/13/2019    Flu vaccine, trivalent, preservative free, HIGH-DOSE, age 65y+ (Fluzone) 09/05/2016, 08/16/2017, 08/09/2018, 08/26/2020    Influenza, Unspecified 09/07/2021    Influenza, seasonal, injectable 09/09/2015, 01/01/2016, 09/01/2017, 10/08/2023    Influenza, trivalent, adjuvanted 09/05/2019    Moderna COVID-19 vaccine, 12 years and older (50mcg/0.5mL)(Spikevax) 09/09/2024    Moderna COVID-19 vaccine, bivalent, blue cap/gray label *Check age/dose* 10/02/2022    Moderna SARS-CoV-2 Vaccination 02/16/2021, 03/17/2021, 10/27/2021, 04/12/2022, 10/08/2023    PPD Test 02/07/2020    Pneumococcal conjugate vaccine, 13-valent (PREVNAR 13) 03/25/2015    Pneumococcal polysaccharide vaccine, 23-valent, age 2 years and older (PNEUMOVAX 23) 01/01/2016, 07/17/2018    Td vaccine, age 7 years and older (TENIVAC) 05/14/2008    Tdap vaccine, age 7 year and older (BOOSTRIX, ADACEL) 04/18/2019    Zoster vaccine, recombinant, adult (SHINGRIX) 04/02/2019    Zoster, live 02/27/2008       Current Outpatient Medications   Medication Instructions    acetaminophen (Tylenol) 500 mg tablet 2 tablets, 2 times daily    cetirizine (ZyrTEC) 10 mg tablet 1 tablet, Daily PRN    citalopram (CELEXA) 20 mg, oral, Daily    fluticasone (Flonase) 50 mcg/actuation nasal spray 1 spray, Each Nostril, Daily, Shake gently. Before first use, prime pump. After use, clean tip and replace cap.    gabapentin (NEURONTIN) 300 mg, oral, Nightly    Humira(CF) Pen 40 mg, subcutaneous, Every 14 days    hydroxychloroquine (Plaquenil) 200 mg tablet oral, Daily     losartan (Cozaar) 100 mg tablet oral, Daily    lovastatin (MEVACOR) 20 mg, oral, Daily, as directed    omega-3 acid ethyl esters (LOVAZA) 1 g, Daily    omeprazole (PRILOSEC) 10 mg, oral, Daily    orphenadrine (NORFLEX) 100 mg, oral, 2 times daily PRN, Do not crush, chew, or split.    vit A/vit C/vit E/zinc/copper (PRESERVISION AREDS ORAL) 1 capsule, Daily        Allergies:  No Known Allergies       Visit Vitals  Smoking Status Former      Physical Exam     Chest Radiograph     XR chest 1 view 01/21/2024    Narrative  STUDY:  Chest Radiograph;  1/21/2024 at 6:20 PM.  INDICATION:  Chest pain.  COMPARISON:  CT chest 1/10/2024, 7/10/2023, 3/22/2023; XR chest 10/14/2021  ACCESSION NUMBER(S):  [id number]  ORDERING CLINICIAN:  CHINO BRADY  TECHNIQUE:  Frontal chest was obtained at 18:20 hours.  FINDINGS:  CARDIOMEDIASTINAL SILHOUETTE:  Cardiomediastinal silhouette is normal in size and configuration.    LUNGS:  Bilaterally upper lobe apical nodules again noted, as well as in the  right lower lobe.  This corresponds with recent CT exam.    ABDOMEN:  No remarkable upper abdominal findings.    BONES:  No acute osseous changes.    Impression  Multiple lung nodules, no focal infiltrates seen.  Signed by Britton Kumar MD      XR CHEST 1 VIEW 02/10/2023    Narrative  MRN: [id number]  Patient Name: YI DAMON    STUDY:  CHEST 1 VIEW;  2/10/2023 1:49 pm    INDICATION:  bilateral lung nodules. now s/p YOSI nodule bx. eval for PTX .    COMPARISON:  Chest radiograph 2/10/2023 10:51 a.m.    ACCESSION NUMBER(S):  [id number]    ORDERING CLINICIAN:  ISHAN CEJA    FINDINGS:  PA and lateral radiographs of the chest were provided.  Additional PA  dual energy images were also provided.        CARDIOMEDIASTINAL SILHOUETTE:  Cardiomediastinal silhouette is normal in size and configuration.    LUNGS:  No pneumothorax. No consolidation, pulmonary edema, or pleural  effusion.    ABDOMEN:  No remarkable upper abdominal  findings.    BONES:  No acute osseous changes.    Impression  No sizable pneumothorax status post recent biopsy.    I personally reviewed the images/study and I agree with the findings  as stated by radiology resident Kristal Strauss MD. This study was  interpreted at [facility],  [place], [place].      Echocardiogram     Echocardiogram     Narrative  [facility]  [address], [place] [facility] [address]  Tel [phone] Fax [phone]    TRANSTHORACIC ECHOCARDIOGRAM REPORT      Patient Name:     YI RILEY NELSON Reading Physician:   [id number] Jaky Hoyos MD  Study Date:       7/13/2023      Referring Physician: JAKY HOYOS  MRN/PID:          [id number]       PCP:  Accession/Order#: [id number]   Department Location: [facility]  YOB: 1940       Fellow:  Gender:           M              Nurse:  Admit Date:                      Sonographer:         Mikki Luque RD  Admission Status: Outpatient     Additional Staff:  Height:           185.42 cm      CC Report to:  Weight:           108.41 kg      Study Type:          Echocardiogram  BSA:              2.32 m2  Blood Pressure: 120 /68 mmHg    Diagnosis/ICD: R06.09-Other forms of dyspnea  Indication:    Dyspnea  Procedure/CPT: Echo Complete w Full Doppler-[id number]  Study Detail: The following Echo studies were performed: 2D, M-Mode, color flow  and Doppler. Definity used as a contrast agent for endocardial  border definition. Total contrast used for this procedure was 3 mL  via IV push.      PHYSICIAN INTERPRETATION:  Left Ventricle: Left ventricular systolic function is normal, with an estimated ejection fraction of 55-60%. There are no regional wall motion abnormalities. The left ventricular cavity size is normal. Spectral Doppler shows an impaired relaxation pattern of left ventricular diastolic filling.  Left Atrium: The left atrium is normal in size.  Right Ventricle: The right ventricle is normal in  size. There is normal right ventricular global systolic function.  Right Atrium: The right atrium is normal in size.  Aortic Valve: The aortic valve appears structurally normal. There is mild aortic valve regurgitation. The peak instantaneous gradient of the aortic valve is 7.4 mmHg. The mean gradient of the aortic valve is 4.0 mmHg.  Mitral Valve: The mitral valve is normal in structure. There is no evidence of mitral valve regurgitation.  Tricuspid Valve: The tricuspid valve is structurally normal. There is trace tricuspid regurgitation. The estimated RVSP is 29 mm.  Pulmonic Valve: The pulmonic valve is structurally normal. There is trace pulmonic valve regurgitation.  Pericardium: There is no pericardial effusion noted.  Aorta: The aortic root is normal.      CONCLUSIONS:  1. Left ventricular systolic function is normal with a 55-60% estimated ejection fraction.  2. Spectral Doppler shows an impaired relaxation pattern of left ventricular diastolic filling.  3. The estimated RVSP is 29 mm.  4. Mild aortic valve regurgitation.    QUANTITATIVE DATA SUMMARY:  2D MEASUREMENTS:  Normal Ranges:  LAs:           4.30 cm   (2.7-4.0cm)  IVSd:          0.98 cm   (0.6-1.1cm)  LVPWd:         0.80 cm   (0.6-1.1cm)  LVIDd:         5.40 cm   (3.9-5.9cm)  LVIDs:         4.22 cm  LV Mass Index: 76.5 g/m2  LV % FS        21.9 %    LA VOLUME:  Normal Ranges:  LA Vol A4C:        41.2 ml    (22+/-6mL/m2)  LA Vol A2C:        43.6 ml  LA Vol BP:         42.5 ml  LA Vol Index A4C:  17.8ml/m2  LA Vol Index A2C:  18.8 ml/m2  LA Vol Index BP:   18.3 ml/m2  LA Area A4C:       16.2 cm2  LA Area A2C:       16.7 cm2  LA Major Axis A4C: 5.4 cm  LA Major Axis A2C: 5.4 cm  LA Volume Index:   16.8 ml/m2  LA Vol A4C:        37.0 ml  LA Vol A2C:        42.0 ml    AORTA MEASUREMENTS:  Normal Ranges:  Asc Ao, d: 3.75 cm (2.1-3.4cm)    LV SYSTOLIC FUNCTION BY 2D PLANIMETRY (MOD):  Normal Ranges:  EF-A4C View: 55.3 % (>=55%)  EF-A2C View: 55.6  %  EF-Biplane:  54.9 %    LV DIASTOLIC FUNCTION:  Normal Ranges:  MV Peak E:    0.49 m/s (0.7-1.2 m/s)  MV Peak A:    0.69 m/s (0.42-0.7 m/s)  E/A Ratio:    0.71     (1.0-2.2)  MV e'         0.07 m/s (>8.0)  MV lateral e' 0.09 m/s  MV medial e'  0.05 m/s  E/e' Ratio:   7.02     (<8.0)    MITRAL VALVE:  Normal Ranges:  MV DT: 289 msec (150-240msec)    AORTIC VALVE:  Normal Ranges:  AoV Vmax:                1.36 m/s (<=1.7m/s)  AoV Peak P.4 mmHg (<20mmHg)  AoV Mean P.0 mmHg (1.7-11.5mmHg)  LVOT Max Juaquin:            0.89 m/s (<=1.1m/s)  AoV VTI:                 25.90 cm (18-25cm)  LVOT VTI:                18.20 cm  LVOT Diameter:           2.00 cm  (1.8-2.4cm)  AoV Area, VTI:           2.21 cm2 (2.5-5.5cm2)  AoV Area,Vmax:           2.05 cm2 (2.5-4.5cm2)  AoV Dimensionless Index: 0.70    AORTIC INSUFFICIENCY:  AI Vmax:       4.15 m/s  AI Half-time:  652 msec  AI Decel Rate: 197.50 cm/s2      RIGHT VENTRICLE:  RV Basal 3.12 cm  RV Mid   2.13 cm  RV Major 7.3 cm  TAPSE:   23.3 mm  RV s'    0.11 m/s    TRICUSPID VALVE/RVSP:  Normal Ranges:  Peak TR Velocity: 1.98 m/s  RV Syst Pressure: 18.7 mmHg (< 30mmHg)      [id number] Bull Balderas MD  Electronically signed on 2023 at 6:55:35 PM       Chest CT Scan     CT chest wo IV contrast 01/10/2024    Narrative  Interpreted By:  Sandro Mayer,  STUDY:  CT CHEST WO IV CONTRAST;  1/10/2024 9:38 am    INDICATION:  Signs/Symptoms: multiple lung nodules - 6 months follow up  R91.8:  Lung nodules.    COMPARISON:  07/10/2023    ACCESSION NUMBER(S):  [id number]    ORDERING CLINICIAN:  TERESSA RODGERS    TECHNIQUE:  Helical data acquisition of the chest was obtained without the use of  IV contrast. Images were reformatted in axial, coronal, and sagittal  planes.    FINDINGS:  POTENTIAL LIMITATIONS OF THE STUDY:   Lack of IV contrast    HEART AND VESSELS:    There are atherosclerotic calcifications of the aorta and its  branches. The aorta is unchanged  in course and caliber.    The heart is not significantly enlarged.    No pericardial effusion is seen.    MEDIASTINUM AND JOE, LOWER NECK AND AXILLA:  The visualized thyroid gland is within normal limits.    No evidence of thoracic lymphadenopathy by CT criteria.    Esophagus appears within normal limits as seen.    LUNGS AND AIRWAYS:  The trachea and central airways are patent. No endobronchial lesion.    There are numerous nodules scattered throughout the lungs which have  increased in size when compared to the previous examination. In  addition, a few of the nodules are now cavitary. A right upper lobe  nodule which measures up to 1.5 cm on today's study measured  approximately 1.3 cm on the previous study, image 72. Cavitary nodule  in the left upper lobe which measures 1.8 cm on today's exam measured  approximately 1.6 cm on the previous study, image 68. 9 mm nodule in  the right lower lobe measured 7 mm on the previous study, image 154.  The remaining nodules are either increased in size or are stable in  size. No definite new nodules are identified. No effusion. No  pneumothorax. Mild scattered areas of atelectasis/scarring.    UPPER ABDOMEN:  The visualized subdiaphragmatic structures demonstrate no acute  abnormality.    CHEST WALL AND OSSEOUS STRUCTURES:  Degenerative changes. No acute process.    Impression  Interval increase in the size of the previously noted pulmonary  nodules. In addition, a few of the nodules are now cavitary. Findings  may relate to progression of metastatic disease or possibly  infectious/inflammatory process. Correlate with biopsy results.    MACRO:  None.    Signed by: Sandro Mayer 1/11/2024 4:25 PM  Dictation workstation:   [id number]      CT chest wo IV contrast 07/10/2023    Narrative  Interpreted By:  SANDRO MAYER MD  MRN: [id number]  Patient Name: YI DAMON    STUDY:  CT CHEST WO CONTRAST;  7/10/2023 4:57 pm    INDICATION:  lung nodules  R91.8: Multiple pulmonary nodules  determined by  computed tomography of lung.    COMPARISON:  03/22/2023    ACCESSION NUMBER(S):  [id number]    ORDERING CLINICIAN:  ANNIE ROSADO    TECHNIQUE:  Helical data acquisition of the chest was obtained without the use of  IV contrast. Images were reformatted in axial, coronal, and sagittal  planes.    FINDINGS:  POTENTIAL LIMITATIONS OF THE STUDY:   Lack of IV contrast    HEART AND VESSELS:    There are atherosclerotic calcifications of the aorta and its  branches. Aorta is unchanged in course and caliber.    The heart is unchanged in size.    No pericardial effusion is seen.    MEDIASTINUM AND JOE, LOWER NECK AND AXILLA:  The visualized thyroid gland is within normal limits.    No evidence of thoracic lymphadenopathy by CT criteria.    Esophagus is stable in course and caliber. Small hiatal hernia.    LUNGS AND AIRWAYS:  The trachea and central airways are patent. No endobronchial lesion.    The patient's known pulmonary nodules are again identified and are  not significantly changed in size, measuring up to 1.7 Cm. For  example, images 62, 58, 168, 191, 215 and 201 of 293. No new  suspicious pulmonary nodule or mass. No consolidation. No evidence of  an effusion or pneumothorax. There are mild scattered areas of  atelectasis/scarring.    UPPER ABDOMEN:  The visualized subdiaphragmatic structures demonstrate no acute  abnormality. Stable subcentimeter hypoattenuating lesion in the right  hepatic lobe which is statistically most likely a cyst but is too  small to characterize. Left renal cyst is again partially  included/imaged.    CHEST WALL AND OSSEOUS STRUCTURES:  Degenerative changes. No acute process.    Impression  Stable scattered pulmonary nodules measuring 1.7 cm or less in size.  No new suspicious pulmonary nodule or mass. Continued surveillance  recommended as per Fleischner society guidelines.       Laboratory Studies     Lab Results   Component Value Date    WBC 10.5 02/12/2025    HGB 12.6 (L)  02/12/2025    HCT 39.0 02/12/2025    MCV 87.4 02/12/2025     (H) 02/12/2025      Lab Results   Component Value Date    GLUCOSE 86 02/12/2025    CALCIUM 9.5 02/12/2025     02/12/2025    K 4.4 02/12/2025    CO2 26 02/12/2025     02/12/2025    BUN 26 (H) 02/12/2025    CREATININE 1.20 02/12/2025      Lab Results   Component Value Date    ALT 12 02/12/2025    AST 15 02/12/2025    ALKPHOS 67 02/12/2025    BILITOT 0.4 02/12/2025        Protime   Date/Time Value Ref Range Status   02/06/2024 08:09 AM 11.9 9.8 - 12.8 seconds Final     INR   Date/Time Value Ref Range Status   02/06/2024 08:09 AM 1.1 0.9 - 1.1 Final           Assessment and Plan / Recommendations     See HPI    Obed Camarena MD   02/27/2025

## 2025-03-17 PROCEDURE — RXMED WILLOW AMBULATORY MEDICATION CHARGE

## 2025-03-19 ENCOUNTER — PHARMACY VISIT (OUTPATIENT)
Dept: PHARMACY | Facility: CLINIC | Age: 85
End: 2025-03-19
Payer: COMMERCIAL

## 2025-03-20 ENCOUNTER — APPOINTMENT (OUTPATIENT)
Facility: CLINIC | Age: 85
End: 2025-03-20
Payer: MEDICARE

## 2025-03-24 ENCOUNTER — APPOINTMENT (OUTPATIENT)
Dept: RHEUMATOLOGY | Facility: CLINIC | Age: 85
End: 2025-03-24
Payer: MEDICARE

## 2025-03-25 DIAGNOSIS — M05.79 RHEUMATOID ARTHRITIS INVOLVING MULTIPLE SITES WITH POSITIVE RHEUMATOID FACTOR (MULTI): Primary | ICD-10-CM

## 2025-03-25 RX ORDER — PREDNISONE 10 MG/1
TABLET ORAL
Qty: 15 TABLET | Refills: 1 | Status: SHIPPED | OUTPATIENT
Start: 2025-03-25 | End: 2025-04-09

## 2025-04-03 DIAGNOSIS — I10 PRIMARY HYPERTENSION: ICD-10-CM

## 2025-04-03 RX ORDER — LOSARTAN POTASSIUM 100 MG/1
TABLET ORAL DAILY
Qty: 45 TABLET | Refills: 3 | Status: SHIPPED | OUTPATIENT
Start: 2025-04-03

## 2025-04-14 ENCOUNTER — SPECIALTY PHARMACY (OUTPATIENT)
Dept: PHARMACY | Facility: CLINIC | Age: 85
End: 2025-04-14

## 2025-04-14 PROCEDURE — RXMED WILLOW AMBULATORY MEDICATION CHARGE

## 2025-04-16 ENCOUNTER — PHARMACY VISIT (OUTPATIENT)
Dept: PHARMACY | Facility: CLINIC | Age: 85
End: 2025-04-16
Payer: COMMERCIAL

## 2025-04-21 DIAGNOSIS — M05.79 RHEUMATOID ARTHRITIS INVOLVING MULTIPLE SITES WITH POSITIVE RHEUMATOID FACTOR (MULTI): Primary | ICD-10-CM

## 2025-04-21 RX ORDER — PREDNISONE 10 MG/1
TABLET ORAL
Qty: 15 TABLET | Refills: 2 | Status: SHIPPED | OUTPATIENT
Start: 2025-04-21 | End: 2025-05-06

## 2025-04-24 NOTE — PROGRESS NOTES
Subjective   Patient ID: [id number]   Maurice Tripp is a 84 y.o. male with seropositive RA, obesity with a BMI of 32, DL, HTN, and CKD stage III, presents for follow up for RA    Current IS:  - Humira 40 mg subcutaneous every 2 weeks started in 2/25  - HCQ 400mg QD (eye exam UTD 10/2023- [facility], no toxicity from plaquanil)     Prior IS:  - MTX was on it for a long time, switched over to Leflunomide as he was on MTX for too long  - Leflunomide 20mg QD ( for at least 2-3 years), stopped due to ILD     HPI  Saw pulm Dr. Burns, stable CT  Saw sleep medicine for SHA, improving with CPAP   Severe flare in 4/25 -> steroids  Reports that his hands were swollen and painful, still has some residual swelling in his right 3rd and 4th PIPs  No MS  No joint pains today  Compliant with meds  No side effects reported  No episodes of eye inflammation  No sicca sx  No chest pain, cough or dyspnea  No rashes  No infections    History of falls / fractures: no  Loss of height: no  Tobacco: no  Alcohol: no  Vitamin D supplementation: no  Calcium supplementation: no  Weight bearing exercise: no  Previous or planned invasive jaw surgery: no  History of radiation: no  Chronic steroid use: yes  History of RA: yes  GERD: yes  Ken-en-y: no  CKD / GFR <30: no  Parental hip fracture: no    ROS:  As per HPI     Dr. Camarena previously reached out to me to switch his Lef due to his lung findings  Reports seeing optho a year ago and no retinal toxicity, needs to make an radha   Has a concert this weekend, playing the [employer], needs prednisone   Saw cardiology for possible PAD, seems more neuropathy, ordered ABIs -> unremarkable   Saw PCP and ordered an EMG for his feet neuropathy and some blood tests   Saw pulm Dr. Venegas, had growth in his pulmonary nodules with fatigue and cough, ordered a repeat biopsy  02/2024 - Biopsy of YOSI nodule with organizing PNA, chronic inflammation, focal necrosis (possible granuloma)   Saw hem/onc ->  no malignancy  He was an , retired last year  Recently diagnosed with SHA, wearing the CPAP now  Sees dermatology once a year, no significant rashes      Rheum hx (Recall from Dr. Craven's notes):  Saw Dr. Roldan 2014 (previously Dr. Servando Barrientos- RA diagnosis, + RF tried gold shots ineffective, MTX + HCQ since ~2004), then saw Dr. Barry.      In the morning, on waking up, gets pain in feet and stiffness in feet/ankles. AM stiffness in feet can last few hours. Denies significant pain/swelling/stiffness in hand, wrists, elbow or shoulder currently. Sees Podiatry (hx of hammertoe involving all digits). No skin rashes. + dry mouth at night, no dry eyes, no hx of ocular inflammation. No hx of rheumatoid nodules. Has had paresthesia in feet, EMG/NCS done twice and states the tests were ok. No night sweats, unintentional weight loss, or lymph node swelling . No bowel issues, had a colonoscopy 2 months ago, no concerns     No significant fmhx   Wife recently had a hip surgery. 2 sons. 3 grandkids    Patient Active Problem List   Diagnosis    Abnormal CXR (chest x-ray)    Anxiety    BPH (benign prostatic hyperplasia)    Elevated PSA    GERD (gastroesophageal reflux disease)    Grade I hemorrhoids    Hyperlipidemia    Hypertension    Male erectile disorder    Multiple pulmonary nodules determined by computed tomography of lung    Rheumatoid arthritis    Stage 3a chronic kidney disease (Multi)    Vitamin D deficiency    Shortness of breath    Other nonthrombocytopenic purpura    Anemia    Obstructive sleep apnea    Muscle weakness (generalized)    Lung field abnormal    Long term (current) use of unspecified immunomodulators and immunosuppressants    Fatigue    Dyslipidemia    Difficulty in walking, not elsewhere classified    Disorder of iron metabolism    Alcohol dependence, in remission    Acquired hammertoe of right foot    Acquired hallux malleus    Abnormal gait    Achilles tendinitis    Thrombocytopenia,  unspecified (CMS-[facility])    Environmental and seasonal allergies    Allergic rhinitis    Atypical chest pain    Elevated troponin    Coronary artery calcification      Past Medical History:   Diagnosis Date    Acute bronchitis 2023    Acute non-recurrent maxillary sinusitis 2023    Alcohol dependence 2023    BRBPR (bright red blood per rectum) 2023    Fungal nail infection 2023    Right cervical radiculopathy 2023    Subacute cough 2023      Past Surgical History:   Procedure Laterality Date    CT GUIDED PERCUTANEOUS BIOPSY LUNG  2/10/2023    CT GUIDED PERCUTANEOUS BIOPSY LUNG 2/10/2023 DOCTOR OFFICE [facility]GAC[facility]      Social History     Socioeconomic History    Marital status:      Spouse name: Not on file    Number of children: Not on file    Years of education: Not on file    Highest education level: Not on file   Occupational History    Not on file   Tobacco Use    Smoking status: Former     Current packs/day: 0.00     Types: Cigarettes     Quit date:      Years since quittin.3    Smokeless tobacco: Never   Substance and Sexual Activity    Alcohol use: Not Currently     Comment: Hx: AA quit .    Drug use: Never    Sexual activity: Defer   Other Topics Concern    Not on file   Social History Narrative    Not on file     Social Drivers of Health     Financial Resource Strain: Low Risk  (10/18/2024)    Overall Financial Resource Strain (CARDIA)     Difficulty of Paying Living Expenses: Not hard at all   Food Insecurity: No Food Insecurity (3/5/2024)    Hunger Vital Sign     Worried About Running Out of Food in the Last Year: Never true     Ran Out of Food in the Last Year: Never true   Transportation Needs: No Transportation Needs (10/18/2024)    PRAPARE - Transportation     Lack of Transportation (Medical): No     Lack of Transportation (Non-Medical): No   Physical Activity: Inactive (10/18/2024)    Exercise Vital Sign     Days of Exercise per Week: 0 days      Minutes of Exercise per Session: 0 min   Stress: No Stress Concern Present (10/18/2024)    [employer] [place] [employer] - Occupational Stress Questionnaire     Feeling of Stress : Not at all   Social Connections: Moderately Isolated (10/18/2024)    Social Connection and Isolation Panel [NHANES]     Frequency of Communication with Friends and Family: Once a week     Frequency of Social Gatherings with Friends and Family: Once a week     Attends Sikhism Services: Never     Active Member of Clubs or Organizations: Yes     Attends Club or Organization Meetings: More than 4 times per year     Marital Status:    Intimate Partner Violence: Not At Risk (10/18/2024)    Humiliation, Afraid, Rape, and Kick questionnaire     Fear of Current or Ex-Partner: No     Emotionally Abused: No     Physically Abused: No     Sexually Abused: No   Housing Stability: Unknown (10/18/2024)    Housing Stability Vital Sign     Unable to Pay for Housing in the Last Year: No     Number of Times Moved in the Last Year: Not on file     Homeless in the Last Year: No      No Known Allergies     Current Outpatient Medications:     acetaminophen (Tylenol) 500 mg tablet, Take 2 tablets (1,000 mg) by mouth 2 times a day., Disp: , Rfl:     cetirizine (ZyrTEC) 10 mg tablet, Take 1 tablet (10 mg) by mouth once daily as needed for allergies., Disp: , Rfl:     citalopram (CeleXA) 20 mg tablet, TAKE 1 TABLET DAILY, Disp: 90 tablet, Rfl: 3    fluticasone (Flonase) 50 mcg/actuation nasal spray, Administer 1 spray into each nostril once daily. Shake gently. Before first use, prime pump. After use, clean tip and replace cap., Disp: 16 g, Rfl: 2    gabapentin (Neurontin) 300 mg capsule, Take 1 capsule (300 mg) by mouth once daily at bedtime., Disp: 30 capsule, Rfl: 6    Humira,CF, Pen 40 mg/0.4 mL pen injector kit pen-injector, Inject 1 Pen (40 mg) under the skin every 14 (fourteen) days., Disp: 2 each, Rfl: 2    hydroxychloroquine (Plaquenil)  200 mg tablet, TAKE 2 TABLETS ONCE DAILY, Disp: 180 tablet, Rfl: 3    losartan (Cozaar) 100 mg tablet, TAKE ONE-HALF (1/2) TABLET DAILY, Disp: 45 tablet, Rfl: 3    lovastatin (Mevacor) 20 mg tablet, TAKE 1 TABLET DAILY AS DIRECTED, Disp: 90 tablet, Rfl: 3    omega-3 acid ethyl esters (Lovaza) 1 gram capsule, Take 1 capsule (1 g) by mouth once daily., Disp: , Rfl:     omeprazole (PriLOSEC) 10 mg DR capsule, TAKE 1 CAPSULE DAILY, Disp: 90 capsule, Rfl: 3    orphenadrine (Norflex) 100 mg 12 hr tablet, Take 1 tablet (100 mg) by mouth 2 times a day as needed for muscle spasms for up to 10 days. Do not crush, chew, or split., Disp: 20 tablet, Rfl: 0    vit A/vit C/vit E/zinc/copper (PRESERVISION AREDS ORAL), Take 1 capsule by mouth once daily., Disp: , Rfl:      Objective   Visit Vitals  /71   Pulse 78   Temp 36.7 °C (98.1 °F)   Resp 20     Physical Exam:  General: AAOx3, Cooperative  Eyes: EOMI, conjunctiva clear, sclera white, anicteric  Throat/Mouth: No oral deformities, no cheek swelling, mucosa appear moist, no oral ulcers noted or loss of dentition   Skin: No rashes, ulcers or photosensitive areas  MSK: Upper Extremities:  Hand/Fingers: Synovial swelling in his right 3rd and 4th PIPs without TTP. No erythema, edema, tenderness or warmth at DIP, PIP, or MCP joints, FROM grossly. Good hand . No nodules. No deformities   Wrists: No erythema, edema, warmth or tenderness at wrist, FROM grossly  Elbows: No tenderness, edema, erythema or warmth at elbows, FROM grossly. No nodules   Shoulders: No edema, erythema, tenderness or warmth at shoulders. FROM  Lower Extremities:   Hips: No obvious deformities. No joint tenderness, normal ROM grossly. No trochanteric bursae TTP  Knees: No tenderness, deformities, edema, rashes, or warmth, normal ROM grossly. No crepitus, no pes anserine bursa TTP   Ankles, feet: No deformities, tenderness, edema, erythema, ulceration, or warmth at the ankle or MTP/IP joints, normal ROM  grossly  Spine: No spinal tenderness to palpation. No SI joint tenderness    Lab Results   Component Value Date    WBC 8.5 05/07/2025    HGB 11.6 (L) 05/07/2025    HCT 35.8 (L) 05/07/2025    MCV 89.7 05/07/2025     05/07/2025        Chemistry    Lab Results   Component Value Date/Time     05/07/2025 1340    K 4.7 05/07/2025 1340     05/07/2025 1340    CO2 22 05/07/2025 1340    BUN 20 05/07/2025 1340    CREATININE 1.24 (H) 05/07/2025 1340    Lab Results   Component Value Date/Time    CALCIUM 9.1 05/07/2025 1340    ALKPHOS 54 05/07/2025 1340    AST 17 05/07/2025 1340    ALT 12 05/07/2025 1340    BILITOT 0.4 05/07/2025 1340         Lab Results   Component Value Date    CRP 32.4 (H) 05/07/2025      Lab Results   Component Value Date    SEDRATE 34 (H) 05/07/2025      Lab Results   Component Value Date    ALT 12 05/07/2025    AST 17 05/07/2025    ALKPHOS 54 05/07/2025    BILITOT 0.4 05/07/2025      === 02/10/23 ===  XR CHEST  No sizable pneumothorax status post recent biopsy.    CT chest 7/23:  Stable scattered pulmonary nodules measuring 1.7 cm or less in size.  No new suspicious pulmonary nodule or mass. Continued surveillance recommended as per Fleischner society guidelines.      Assessment/Plan    This is an 84 Y M with seropositive RA (, CCP 95), presenting for follow up  Last seen in 2/25    Labs:  5/25: Hb 11.6, Cr 1.24, CRP 3.24, rest of CBC, CMP, ESR wnl  2/25: Hb 12.6, PLT 404K, CRP 1.06, CMP, ESR, rest of CBC wnl  T spot, hepatitis serology neg  1/25:  ferritin 477   12/24: CRP 13, ESR 53, Hb 11.4, AEC 1000, Cr 1.27, rest of CBC, CMP, TSH wnl  9/24: Hb 11.8, rest of CBC, CMP wnl  5/24: Hb 11.8, Cr 1.26, CRP 1.07, ferritin 513, % sat 22, rest of CBC, CMP, ESR, iron studies, TSH, vitamin B12 and D wnl   2/24: Hb 12.1, rest of CBC  1/24: Hb 12.9, Cr 1.35, rest of CBC, CMP, UA, Uprt,   11/23: Hb 11.9, Cr 1.36, rest of CBC, CMP, ESR, CRP,   7/23: Hg improved, Cr 1.36  4/2023 ESR/CRP nml.  ANCA with MPO/PR3 negative  3/21/23 Hg12. 4, GFR 57    CT chest 9/24:  1. No thoracic or abdominal aortic aneurysm or dissection.  2. Severe coronary calcification  3. Apparent growing irregular pulmonary nodules which remain concerning for malignancy continued surveillance advised  4. Constipation. Mild gastric wall thickening  5. Enlarged prostate gland.    PFTs 6/2023, no obstruction. DLCO nml     # Seropositive RA, CDAI: 19, will start bDMARD and stop Arava   # Lung nodule- sees Pulm/Onc. Biopsy shows organizing PNA. Given inflammatory arthritis is overall well controlled, less likely RA related and notable there has been some decrease in size without change in RA medications    - Increase Humira 40 mg subcutaneous to every week, messaged [facility] about it, if not approved, will switch to Enbrel   - Labs with next radha   - Keep off Leflunomide 20mg daily  - Continue HCQ 400mg daily (needs an eye exam)  - Follow up with Pulm for cavitation/nodules  - Follow up with ophtho  - Not taking any Vitamin D/Ca . Denies hx of fragility fx  - Vaccine UTD including COVID 19, flu, PNA and shingles vaccinations (2 series) and RSV  - DEXA for screening of OP ordered     RTC in 3 months    Plan, including risks and benefits, was discussed with the patient, informed on how to reach us.     To schedule an appointment, call [phone]  To reach the rheumatology office, call [phone]    Trice De Los Santos MD   Division of Rheumatology  [facility]

## 2025-05-07 ENCOUNTER — SPECIALTY PHARMACY (OUTPATIENT)
Dept: PHARMACY | Facility: CLINIC | Age: 85
End: 2025-05-07

## 2025-05-08 LAB
ALBUMIN SERPL-MCNC: 4.1 G/DL (ref 3.6–5.1)
ALP SERPL-CCNC: 54 U/L (ref 35–144)
ALT SERPL-CCNC: 12 U/L (ref 9–46)
ANION GAP SERPL CALCULATED.4IONS-SCNC: 11 MMOL/L (CALC) (ref 7–17)
AST SERPL-CCNC: 17 U/L (ref 10–35)
BASOPHILS # BLD AUTO: 60 CELLS/UL (ref 0–200)
BASOPHILS NFR BLD AUTO: 0.7 %
BILIRUB SERPL-MCNC: 0.4 MG/DL (ref 0.2–1.2)
BUN SERPL-MCNC: 20 MG/DL (ref 7–25)
CALCIUM SERPL-MCNC: 9.1 MG/DL (ref 8.6–10.3)
CHLORIDE SERPL-SCNC: 107 MMOL/L (ref 98–110)
CO2 SERPL-SCNC: 22 MMOL/L (ref 20–32)
CREAT SERPL-MCNC: 1.24 MG/DL (ref 0.7–1.22)
CRP SERPL-MCNC: 32.4 MG/L
EGFRCR SERPLBLD CKD-EPI 2021: 58 ML/MIN/1.73M2
EOSINOPHIL # BLD AUTO: 289 CELLS/UL (ref 15–500)
EOSINOPHIL NFR BLD AUTO: 3.4 %
ERYTHROCYTE [DISTWIDTH] IN BLOOD BY AUTOMATED COUNT: 13.9 % (ref 11–15)
ERYTHROCYTE [SEDIMENTATION RATE] IN BLOOD BY WESTERGREN METHOD: 34 MM/H
GLUCOSE SERPL-MCNC: 82 MG/DL (ref 65–139)
HCT VFR BLD AUTO: 35.8 % (ref 38.5–50)
HGB BLD-MCNC: 11.6 G/DL (ref 13.2–17.1)
LYMPHOCYTES # BLD AUTO: 2202 CELLS/UL (ref 850–3900)
LYMPHOCYTES NFR BLD AUTO: 25.9 %
MCH RBC QN AUTO: 29.1 PG (ref 27–33)
MCHC RBC AUTO-ENTMCNC: 32.4 G/DL (ref 32–36)
MCV RBC AUTO: 89.7 FL (ref 80–100)
MONOCYTES # BLD AUTO: 935 CELLS/UL (ref 200–950)
MONOCYTES NFR BLD AUTO: 11 %
NEUTROPHILS # BLD AUTO: 5015 CELLS/UL (ref 1500–7800)
NEUTROPHILS NFR BLD AUTO: 59 %
PLATELET # BLD AUTO: 256 THOUSAND/UL (ref 140–400)
PMV BLD REES-ECKER: 10.1 FL (ref 7.5–12.5)
POTASSIUM SERPL-SCNC: 4.7 MMOL/L (ref 3.5–5.3)
PROT SERPL-MCNC: 6.6 G/DL (ref 6.1–8.1)
RBC # BLD AUTO: 3.99 MILLION/UL (ref 4.2–5.8)
SODIUM SERPL-SCNC: 140 MMOL/L (ref 135–146)
WBC # BLD AUTO: 8.5 THOUSAND/UL (ref 3.8–10.8)

## 2025-05-12 ENCOUNTER — APPOINTMENT (OUTPATIENT)
Dept: RHEUMATOLOGY | Facility: CLINIC | Age: 85
End: 2025-05-12
Payer: MEDICARE

## 2025-05-12 VITALS
DIASTOLIC BLOOD PRESSURE: 71 MMHG | HEART RATE: 78 BPM | TEMPERATURE: 98.1 F | RESPIRATION RATE: 20 BRPM | WEIGHT: 230 LBS | SYSTOLIC BLOOD PRESSURE: 120 MMHG | BODY MASS INDEX: 30.34 KG/M2

## 2025-05-12 DIAGNOSIS — Z79.899 LONG-TERM USE OF PLAQUENIL: ICD-10-CM

## 2025-05-12 DIAGNOSIS — E55.9 VITAMIN D DEFICIENCY: ICD-10-CM

## 2025-05-12 DIAGNOSIS — R91.8 MULTIPLE PULMONARY NODULES DETERMINED BY COMPUTED TOMOGRAPHY OF LUNG: ICD-10-CM

## 2025-05-12 DIAGNOSIS — Z78.0 ENCOUNTER FOR OSTEOPOROSIS SCREENING IN ASYMPTOMATIC POSTMENOPAUSAL PATIENT: ICD-10-CM

## 2025-05-12 DIAGNOSIS — M05.79 RHEUMATOID ARTHRITIS INVOLVING MULTIPLE SITES WITH POSITIVE RHEUMATOID FACTOR (MULTI): Primary | ICD-10-CM

## 2025-05-12 DIAGNOSIS — Z51.81 ENCOUNTER FOR MONITORING OF ADALIMUMAB THERAPY: ICD-10-CM

## 2025-05-12 DIAGNOSIS — Z79.620 ENCOUNTER FOR MONITORING OF ADALIMUMAB THERAPY: ICD-10-CM

## 2025-05-12 DIAGNOSIS — Z79.69 ON LONG TERM LEFLUNOMIDE THERAPY: ICD-10-CM

## 2025-05-12 DIAGNOSIS — Z13.820 ENCOUNTER FOR OSTEOPOROSIS SCREENING IN ASYMPTOMATIC POSTMENOPAUSAL PATIENT: ICD-10-CM

## 2025-05-12 DIAGNOSIS — M05.79 RHEUMATOID ARTHRITIS INVOLVING MULTIPLE SITES WITH POSITIVE RHEUMATOID FACTOR (MULTI): ICD-10-CM

## 2025-05-12 DIAGNOSIS — Z13.820 SCREENING FOR OSTEOPOROSIS: ICD-10-CM

## 2025-05-12 PROCEDURE — 3074F SYST BP LT 130 MM HG: CPT | Performed by: STUDENT IN AN ORGANIZED HEALTH CARE EDUCATION/TRAINING PROGRAM

## 2025-05-12 PROCEDURE — 1160F RVW MEDS BY RX/DR IN RCRD: CPT | Performed by: STUDENT IN AN ORGANIZED HEALTH CARE EDUCATION/TRAINING PROGRAM

## 2025-05-12 PROCEDURE — 1159F MED LIST DOCD IN RCRD: CPT | Performed by: STUDENT IN AN ORGANIZED HEALTH CARE EDUCATION/TRAINING PROGRAM

## 2025-05-12 PROCEDURE — 99214 OFFICE O/P EST MOD 30 MIN: CPT | Performed by: STUDENT IN AN ORGANIZED HEALTH CARE EDUCATION/TRAINING PROGRAM

## 2025-05-12 PROCEDURE — G2211 COMPLEX E/M VISIT ADD ON: HCPCS | Performed by: STUDENT IN AN ORGANIZED HEALTH CARE EDUCATION/TRAINING PROGRAM

## 2025-05-12 PROCEDURE — 3078F DIAST BP <80 MM HG: CPT | Performed by: STUDENT IN AN ORGANIZED HEALTH CARE EDUCATION/TRAINING PROGRAM

## 2025-05-13 ENCOUNTER — SPECIALTY PHARMACY (OUTPATIENT)
Dept: PHARMACY | Facility: CLINIC | Age: 85
End: 2025-05-13

## 2025-05-13 ENCOUNTER — PHARMACY VISIT (OUTPATIENT)
Dept: PHARMACY | Facility: CLINIC | Age: 85
End: 2025-05-13
Payer: COMMERCIAL

## 2025-05-13 DIAGNOSIS — M05.79 RHEUMATOID ARTHRITIS INVOLVING MULTIPLE SITES WITH POSITIVE RHEUMATOID FACTOR (MULTI): ICD-10-CM

## 2025-05-13 PROCEDURE — RXMED WILLOW AMBULATORY MEDICATION CHARGE

## 2025-05-13 RX ORDER — ADALIMUMAB 40MG/0.4ML
KIT SUBCUTANEOUS
Qty: 4 EACH | Refills: 3 | Status: SHIPPED | OUTPATIENT
Start: 2025-05-13

## 2025-05-29 NOTE — PROGRESS NOTES
Subjective   Patient ID: [id number]   Maurice Tripp is a 84 y.o. male with seropositive RA, obesity with a BMI of 32, DL, HTN, and CKD stage III, presents for follow up for RA    Current IS:  - Humira 40 mg subcutaneous every 2 weeks started in 2/25, increased to weekly in 5/25  - HCQ 400mg QD (eye exam UTD 10/2023- [facility], no toxicity from plaquanil)     Prior IS:  - MTX was on it for a long time, switched over to Leflunomide as he was on MTX for too long  - Leflunomide 20mg QD ( for at least 2-3 years), stopped due to ILD  - Vitamin D 1000 daily    HPI  Coming for bilateral knee injections  Taking Humira weekly now, not taking as much prednisone, tylenol helps with his AM stiffness   DEXA shows osteopenia of the LFN with high FRAX, indication to treat   Saw pulm Dr. Burns, stable CT  Saw sleep medicine for SHA, improving with CPAP   No MS  No joint pains today  No swelling  Compliant with meds  No side effects reported  No episodes of eye inflammation  No sicca sx  No chest pain, cough or dyspnea  No rashes  No infections    History of falls / fractures: no  Loss of height: no  Tobacco: no  Alcohol: no  Vitamin D supplementation: no  Calcium supplementation: no  Weight bearing exercise: no  Previous or planned invasive jaw surgery: no  History of radiation: no  Chronic steroid use: yes  History of RA: yes  GERD: yes  Ken-en-y: no  CKD / GFR <30: no  Parental hip fracture: no    ROS:  As per HPI     Dr. Camarena previously reached out to me to switch his Lef due to his lung findings  Reports seeing optho a year ago and no retinal toxicity, needs to make an radha   Has a ChemoCentryx this weekend, playing the [employer], needs prednisone   Saw cardiology for possible PAD, seems more neuropathy, ordered ABIs -> unremarkable   Saw PCP and ordered an EMG for his feet neuropathy and some blood tests   Saw pulm Dr. Venegas, had growth in his pulmonary nodules with fatigue and cough, ordered a repeat  biopsy  02/2024 - Biopsy of YOSI nodule with organizing PNA, chronic inflammation, focal necrosis (possible granuloma)   Saw hem/onc -> no malignancy  He was an , retired last year  Recently diagnosed with SHA, wearing the CPAP now  Sees dermatology once a year, no significant rashes      Rheum hx (Recall from Dr. Craven's notes):  Saw Dr. Roldan 2014 (previously Dr. Servando Barrientos- RA diagnosis, + RF tried gold shots ineffective, MTX + HCQ since ~2004), then saw Dr. Barry.      In the morning, on waking up, gets pain in feet and stiffness in feet/ankles. AM stiffness in feet can last few hours. Denies significant pain/swelling/stiffness in hand, wrists, elbow or shoulder currently. Sees Podiatry (hx of hammertoe involving all digits). No skin rashes. + dry mouth at night, no dry eyes, no hx of ocular inflammation. No hx of rheumatoid nodules. Has had paresthesia in feet, EMG/NCS done twice and states the tests were ok. No night sweats, unintentional weight loss, or lymph node swelling . No bowel issues, had a colonoscopy 2 months ago, no concerns     No significant fmhx   Wife recently had a hip surgery. 2 sons. 3 grandkids    Patient Active Problem List   Diagnosis    Abnormal CXR (chest x-ray)    Anxiety    BPH (benign prostatic hyperplasia)    Elevated PSA    GERD (gastroesophageal reflux disease)    Grade I hemorrhoids    Hyperlipidemia    Hypertension    Male erectile disorder    Multiple pulmonary nodules determined by computed tomography of lung    Rheumatoid arthritis    Stage 3a chronic kidney disease (Multi)    Vitamin D deficiency    Shortness of breath    Other nonthrombocytopenic purpura    Anemia    Obstructive sleep apnea    Muscle weakness (generalized)    Lung field abnormal    Long term (current) use of unspecified immunomodulators and immunosuppressants    Fatigue    Dyslipidemia    Difficulty in walking, not elsewhere classified    Disorder of iron metabolism    Alcohol dependence, in  remission    Acquired hammertoe of right foot    Acquired hallux malleus    Abnormal gait    Achilles tendinitis    Thrombocytopenia, unspecified    Environmental and seasonal allergies    Allergic rhinitis    Atypical chest pain    Elevated troponin    Coronary artery calcification      Past Medical History:   Diagnosis Date    Acute bronchitis 2023    Acute non-recurrent maxillary sinusitis 2023    Alcohol dependence 2023    BRBPR (bright red blood per rectum) 2023    Fungal nail infection 2023    Right cervical radiculopathy 2023    Subacute cough 2023      Past Surgical History:   Procedure Laterality Date    CT GUIDED PERCUTANEOUS BIOPSY LUNG  2/10/2023    CT GUIDED PERCUTANEOUS BIOPSY LUNG 2/10/2023 [facility]CTOR [facility]      Social History     Socioeconomic History    Marital status:      Spouse name: Not on file    Number of children: Not on file    Years of education: Not on file    Highest education level: Not on file   Occupational History    Not on file   Tobacco Use    Smoking status: Former     Current packs/day: 0.00     Types: Cigarettes     Quit date:      Years since quittin.4    Smokeless tobacco: Never   Substance and Sexual Activity    Alcohol use: Not Currently     Comment: Hx: AA quit .    Drug use: Never    Sexual activity: Defer   Other Topics Concern    Not on file   Social History Narrative    Not on file     Social Drivers of Health     Financial Resource Strain: Low Risk  (10/18/2024)    Overall Financial Resource Strain (CARDIA)     Difficulty of Paying Living Expenses: Not hard at all   Food Insecurity: No Food Insecurity (3/5/2024)    Hunger Vital Sign     Worried About Running Out of Food in the Last Year: Never true     Ran Out of Food in the Last Year: Never true   Transportation Needs: No Transportation Needs (10/18/2024)    PRAPARE - Transportation     Lack of Transportation (Medical): No     Lack of Transportation  (Non-Medical): No   Physical Activity: Inactive (10/18/2024)    Exercise Vital Sign     Days of Exercise per Week: 0 days     Minutes of Exercise per Session: 0 min   Stress: No Stress Concern Present (10/18/2024)    [employer] - Occupational Stress Questionnaire     Feeling of Stress : Not at all   Social Connections: Moderately Isolated (10/18/2024)    Social Connection and Isolation Panel [NHANES]     Frequency of Communication with Friends and Family: Once a week     Frequency of Social Gatherings with Friends and Family: Once a week     Attends [employer] Services: Never     Active Member of Clubs or Organizations: Yes     Attends Club or Organization Meetings: More than 4 times per year     Marital Status:    Intimate Partner Violence: Not At Risk (10/18/2024)    Humiliation, Afraid, Rape, and Kick questionnaire     Fear of Current or Ex-Partner: No     Emotionally Abused: No     Physically Abused: No     Sexually Abused: No   Housing Stability: Unknown (10/18/2024)    Housing Stability Vital Sign     Unable to Pay for Housing in the Last Year: No     Number of Times Moved in the Last Year: Not on file     Homeless in the Last Year: No      No Known Allergies     Current Outpatient Medications:     acetaminophen (Tylenol) 500 mg tablet, Take 2 tablets (1,000 mg) by mouth 2 times a day., Disp: , Rfl:     alendronate (Fosamax) 70 mg tablet, Take 1 tablet (70 mg) by mouth every 7 days. Take on an empty stomach. Do not lie down or eat for 1/2 hour after taking., Disp: 51 tablet, Rfl: 0    cetirizine (ZyrTEC) 10 mg tablet, Take 1 tablet (10 mg) by mouth once daily as needed for allergies., Disp: , Rfl:     citalopram (CeleXA) 20 mg tablet, TAKE 1 TABLET DAILY, Disp: 90 tablet, Rfl: 3    fluticasone (Flonase) 50 mcg/actuation nasal spray, Administer 1 spray into each nostril once daily. Shake gently. Before first use, prime pump. After use, clean tip and replace cap., Disp: 16 g,  Rfl: 2    gabapentin (Neurontin) 300 mg capsule, Take 1 capsule (300 mg) by mouth once daily at bedtime., Disp: 30 capsule, Rfl: 6    Humira,CF, Pen 40 mg/0.4 mL pen injector kit pen-injector, Inject 1 pen (40 mg) under the skin once weekly., Disp: 4 each, Rfl: 3    hydroxychloroquine (Plaquenil) 200 mg tablet, TAKE 2 TABLETS ONCE DAILY, Disp: 180 tablet, Rfl: 3    losartan (Cozaar) 100 mg tablet, TAKE ONE-HALF (1/2) TABLET DAILY, Disp: 45 tablet, Rfl: 3    lovastatin (Mevacor) 20 mg tablet, TAKE 1 TABLET DAILY AS DIRECTED, Disp: 90 tablet, Rfl: 3    omega-3 acid ethyl esters (Lovaza) 1 gram capsule, Take 1 capsule (1 g) by mouth once daily., Disp: , Rfl:     omeprazole (PriLOSEC) 10 mg DR capsule, TAKE 1 CAPSULE DAILY, Disp: 90 capsule, Rfl: 3    orphenadrine (Norflex) 100 mg 12 hr tablet, Take 1 tablet (100 mg) by mouth 2 times a day as needed for muscle spasms for up to 10 days. Do not crush, chew, or split., Disp: 20 tablet, Rfl: 0    vit A/vit C/vit E/zinc/copper (PRESERVISION AREDS ORAL), Take 1 capsule by mouth once daily., Disp: , Rfl:      Objective   Visit Vitals  /72   Pulse 80   Temp 36.6 °C (97.8 °F)   Resp 20     Physical Exam:  General: AAOx3, Cooperative  Eyes: EOMI, conjunctiva clear, sclera white, anicteric  Throat/Mouth: No oral deformities, no cheek swelling, mucosa appear moist, no oral ulcers noted or loss of dentition   Skin: No rashes, ulcers or photosensitive areas  MSK: Upper Extremities:  Hand/Fingers: Synovial swelling in his right 3rd and 4th PIPs without TTP. No erythema, edema, tenderness or warmth at DIP, PIP, or MCP joints, FROM grossly. Good hand . No nodules. No deformities   Wrists: No erythema, edema, warmth or tenderness at wrist, FROM grossly  Elbows: No tenderness, edema, erythema or warmth at elbows, FROM grossly. No nodules   Shoulders: No edema, erythema, tenderness or warmth at shoulders. FROM  Lower Extremities:   Hips: No obvious deformities. No joint  tenderness, normal ROM grossly. No trochanteric bursae TTP  Knees: No tenderness, deformities, edema, rashes, or warmth, normal ROM grossly. No crepitus, no pes anserine bursa TTP   Ankles, feet: No deformities, tenderness, edema, erythema, ulceration, or warmth at the ankle or MTP/IP joints, normal ROM grossly  Spine: No spinal tenderness to palpation. No SI joint tenderness    Large Joint Injection/Arthrocentesis: bilateral knee on 6/4/2025 10:53 AM  Indications: pain  Details: 22 G needle, anteromedial approach  Medications (Right): 40 mg triamcinolone acetonide 40 mg/mL; 2 mL lidocaine 10 mg/mL (1 %)  Medications (Left): 40 mg triamcinolone acetonide 40 mg/mL; 2 mL lidocaine 10 mg/mL (1 %)  Outcome: tolerated well, no immediate complications  Consent was given by the patient.         Lab Results   Component Value Date    WBC 8.5 05/07/2025    HGB 11.6 (L) 05/07/2025    HCT 35.8 (L) 05/07/2025    MCV 89.7 05/07/2025     05/07/2025        Chemistry    Lab Results   Component Value Date/Time     05/07/2025 1340    K 4.7 05/07/2025 1340     05/07/2025 1340    CO2 22 05/07/2025 1340    BUN 20 05/07/2025 1340    CREATININE 1.24 (H) 05/07/2025 1340    Lab Results   Component Value Date/Time    CALCIUM 9.1 05/07/2025 1340    ALKPHOS 54 05/07/2025 1340    AST 17 05/07/2025 1340    ALT 12 05/07/2025 1340    BILITOT 0.4 05/07/2025 1340         Lab Results   Component Value Date    CRP 32.4 (H) 05/07/2025      Lab Results   Component Value Date    SEDRATE 34 (H) 05/07/2025      Lab Results   Component Value Date    ALT 12 05/07/2025    AST 17 05/07/2025    ALKPHOS 54 05/07/2025    BILITOT 0.4 05/07/2025      === 02/10/23 ===  XR CHEST  No sizable pneumothorax status post recent biopsy.    CT chest 7/23:  Stable scattered pulmonary nodules measuring 1.7 cm or less in size.  No new suspicious pulmonary nodule or mass. Continued surveillance recommended as per Fleischner society guidelines.    DEXA  6/25:  LEFT FEMUR - TOTAL The bone mineral density : 0.939 g/cm2 T-score : -0.5   LEFT FEMUR - NECK The bone mineral density : 0.877 g/cm2 T-score : -1.2    SPINE L1-L4 The bone mineral density is 1.293 g/cm2 T-score : 0.8    FRAX, Major osteoporotic fractures 14%  hip fracture 6%      Xray knees: Moderate osteoarthritis bilateral knees      Assessment/Plan    This is an 84 Y M with seropositive RA (, CCP 95), presenting for follow up  Last seen in 5/25    Labs:  5/25: Hb 11.6, Cr 1.24, CRP 3.24, rest of CBC, CMP, ESR wnl  2/25: Hb 12.6, PLT 404K, CRP 1.06, CMP, ESR, rest of CBC wnl  T spot, hepatitis serology neg  1/25:  ferritin 477   12/24: CRP 13, ESR 53, Hb 11.4, AEC 1000, Cr 1.27, rest of CBC, CMP, TSH wnl  9/24: Hb 11.8, rest of CBC, CMP wnl  5/24: Hb 11.8, Cr 1.26, CRP 1.07, ferritin 513, % sat 22, rest of CBC, CMP, ESR, iron studies, TSH, vitamin B12 and D wnl   2/24: Hb 12.1, rest of CBC  1/24: Hb 12.9, Cr 1.35, rest of CBC, CMP, UA, Uprt,   11/23: Hb 11.9, Cr 1.36, rest of CBC, CMP, ESR, CRP,   7/23: Hg improved, Cr 1.36  4/2023 ESR/CRP nml. ANCA with MPO/PR3 negative  3/21/23 Hg12. 4, GFR 57    CT chest 9/24:  1. No thoracic or abdominal aortic aneurysm or dissection.  2. Severe coronary calcification  3. Apparent growing irregular pulmonary nodules which remain concerning for malignancy continued surveillance advised  4. Constipation. Mild gastric wall thickening  5. Enlarged prostate gland.    PFTs 6/2023, no obstruction. DLCO nml     # Seropositive RA, CDAI: 19, will start bDMARD and stop Arava   # Lung nodule- sees Pulm/Onc. Biopsy shows organizing PNA. Given inflammatory arthritis is overall well controlled, less likely RA related and notable there has been some decrease in size without change in RA medications  # OA of the knees  # Osteopenia of the LFN T score -1.2 with elevated FRAX, indication to start Fosamax, pt agreeable after explaining the risks and benefits    - Bilateral knee CSI  today for OA, tolerated well   - Ice, tylenol or NSAIDs if knee pain, rest for 72 hours   - Start Fosamax 70 mg weekly   - Keep Humira 40 mg subcutaneous every week, if still flaring up by next time, will switch to Enbrel   - Labs with next radha   - Keep off Leflunomide 20mg daily  - Continue HCQ 400mg daily (needs an eye exam)  - Follow up with Pulm for cavitation/nodules  - Follow up with ophtho  - Continue vitamin D  - Not taking any Ca . Denies hx of fragility fx  - Fall precautions   - Weight bearing exercises   - Vaccine UTD including COVID 19, flu, PNA and shingles vaccinations (2 series) and RSV  - DEXA next is due in 6/27, 2 years after starting tx     RTC in 3 months    Plan, including risks and benefits, was discussed with the patient, informed on how to reach us.     To schedule an appointment, call [phone]  To reach the rheumatology office, call [phone]    Trice De Los Santos MD   Division of Rheumatology  [facility]

## 2025-05-30 ENCOUNTER — HOSPITAL ENCOUNTER (OUTPATIENT)
Dept: RADIOLOGY | Facility: CLINIC | Age: 85
Discharge: HOME | End: 2025-05-30
Payer: MEDICARE

## 2025-05-30 DIAGNOSIS — M05.79 RHEUMATOID ARTHRITIS INVOLVING MULTIPLE SITES WITH POSITIVE RHEUMATOID FACTOR (MULTI): ICD-10-CM

## 2025-05-30 PROCEDURE — 73562 X-RAY EXAM OF KNEE 3: CPT | Mod: 50

## 2025-06-02 ENCOUNTER — APPOINTMENT (OUTPATIENT)
Dept: RADIOLOGY | Facility: CLINIC | Age: 85
End: 2025-06-02
Payer: MEDICARE

## 2025-06-02 DIAGNOSIS — Z51.81 ENCOUNTER FOR MONITORING OF ADALIMUMAB THERAPY: ICD-10-CM

## 2025-06-02 DIAGNOSIS — Z13.820 SCREENING FOR OSTEOPOROSIS: ICD-10-CM

## 2025-06-02 DIAGNOSIS — Z13.820 ENCOUNTER FOR OSTEOPOROSIS SCREENING IN ASYMPTOMATIC POSTMENOPAUSAL PATIENT: ICD-10-CM

## 2025-06-02 DIAGNOSIS — Z79.69 ON LONG TERM LEFLUNOMIDE THERAPY: ICD-10-CM

## 2025-06-02 DIAGNOSIS — Z79.899 LONG-TERM USE OF PLAQUENIL: ICD-10-CM

## 2025-06-02 DIAGNOSIS — E55.9 VITAMIN D DEFICIENCY: ICD-10-CM

## 2025-06-02 DIAGNOSIS — Z78.0 ENCOUNTER FOR OSTEOPOROSIS SCREENING IN ASYMPTOMATIC POSTMENOPAUSAL PATIENT: ICD-10-CM

## 2025-06-02 DIAGNOSIS — R91.8 MULTIPLE PULMONARY NODULES DETERMINED BY COMPUTED TOMOGRAPHY OF LUNG: ICD-10-CM

## 2025-06-02 DIAGNOSIS — M05.79 RHEUMATOID ARTHRITIS INVOLVING MULTIPLE SITES WITH POSITIVE RHEUMATOID FACTOR (MULTI): ICD-10-CM

## 2025-06-02 DIAGNOSIS — Z79.620 ENCOUNTER FOR MONITORING OF ADALIMUMAB THERAPY: ICD-10-CM

## 2025-06-02 PROCEDURE — 77080 DXA BONE DENSITY AXIAL: CPT

## 2025-06-02 PROCEDURE — 77080 DXA BONE DENSITY AXIAL: CPT | Performed by: RADIOLOGY

## 2025-06-04 ENCOUNTER — APPOINTMENT (OUTPATIENT)
Dept: RHEUMATOLOGY | Facility: CLINIC | Age: 85
End: 2025-06-04
Payer: MEDICARE

## 2025-06-04 VITALS
SYSTOLIC BLOOD PRESSURE: 120 MMHG | WEIGHT: 234 LBS | HEART RATE: 80 BPM | BODY MASS INDEX: 30.87 KG/M2 | TEMPERATURE: 97.8 F | DIASTOLIC BLOOD PRESSURE: 72 MMHG | RESPIRATION RATE: 20 BRPM

## 2025-06-04 DIAGNOSIS — Z79.620 ENCOUNTER FOR MONITORING OF ADALIMUMAB THERAPY: ICD-10-CM

## 2025-06-04 DIAGNOSIS — M17.0 PRIMARY OSTEOARTHRITIS OF BOTH KNEES: ICD-10-CM

## 2025-06-04 DIAGNOSIS — Z13.820 SCREENING FOR OSTEOPOROSIS: ICD-10-CM

## 2025-06-04 DIAGNOSIS — Z51.81 ENCOUNTER FOR MONITORING OF ADALIMUMAB THERAPY: ICD-10-CM

## 2025-06-04 DIAGNOSIS — E55.9 VITAMIN D DEFICIENCY: ICD-10-CM

## 2025-06-04 DIAGNOSIS — Z79.899 LONG-TERM USE OF PLAQUENIL: ICD-10-CM

## 2025-06-04 DIAGNOSIS — M85.89 OSTEOPENIA OF MULTIPLE SITES: ICD-10-CM

## 2025-06-04 DIAGNOSIS — M05.79 RHEUMATOID ARTHRITIS INVOLVING MULTIPLE SITES WITH POSITIVE RHEUMATOID FACTOR (MULTI): Primary | ICD-10-CM

## 2025-06-04 DIAGNOSIS — R91.8 MULTIPLE PULMONARY NODULES DETERMINED BY COMPUTED TOMOGRAPHY OF LUNG: ICD-10-CM

## 2025-06-04 RX ORDER — LIDOCAINE HYDROCHLORIDE 10 MG/ML
2 INJECTION, SOLUTION INFILTRATION; PERINEURAL
Status: COMPLETED | OUTPATIENT
Start: 2025-06-04 | End: 2025-06-04

## 2025-06-04 RX ORDER — ALENDRONATE SODIUM 70 MG/1
70 TABLET ORAL
Qty: 51 TABLET | Refills: 0 | Status: SHIPPED | OUTPATIENT
Start: 2025-06-04 | End: 2026-06-04

## 2025-06-04 RX ORDER — TRIAMCINOLONE ACETONIDE 40 MG/ML
40 INJECTION, SUSPENSION INTRA-ARTICULAR; INTRAMUSCULAR
Status: COMPLETED | OUTPATIENT
Start: 2025-06-04 | End: 2025-06-04

## 2025-06-04 RX ADMIN — LIDOCAINE HYDROCHLORIDE 2 ML: 10 INJECTION, SOLUTION INFILTRATION; PERINEURAL at 10:53

## 2025-06-04 RX ADMIN — TRIAMCINOLONE ACETONIDE 40 MG: 40 INJECTION, SUSPENSION INTRA-ARTICULAR; INTRAMUSCULAR at 10:53

## 2025-06-05 ENCOUNTER — SPECIALTY PHARMACY (OUTPATIENT)
Dept: PHARMACY | Facility: CLINIC | Age: 85
End: 2025-06-05

## 2025-06-05 PROCEDURE — RXMED WILLOW AMBULATORY MEDICATION CHARGE

## 2025-06-07 ENCOUNTER — PHARMACY VISIT (OUTPATIENT)
Dept: PHARMACY | Facility: CLINIC | Age: 85
End: 2025-06-07
Payer: COMMERCIAL

## 2025-06-17 ENCOUNTER — HOSPITAL ENCOUNTER (OUTPATIENT)
Dept: RADIOLOGY | Facility: HOSPITAL | Age: 85
Discharge: HOME | End: 2025-06-17
Payer: MEDICARE

## 2025-06-17 DIAGNOSIS — R91.8 LUNG NODULES: ICD-10-CM

## 2025-06-17 PROCEDURE — 71250 CT THORAX DX C-: CPT

## 2025-06-17 PROCEDURE — 71250 CT THORAX DX C-: CPT | Performed by: SURGERY

## 2025-06-25 ENCOUNTER — APPOINTMENT (OUTPATIENT)
Facility: CLINIC | Age: 85
End: 2025-06-25
Payer: MEDICARE

## 2025-06-25 VITALS — HEIGHT: 73 IN | WEIGHT: 235 LBS | BODY MASS INDEX: 31.14 KG/M2

## 2025-06-25 DIAGNOSIS — R05.3 CHRONIC COUGH: ICD-10-CM

## 2025-06-25 DIAGNOSIS — R91.8 LUNG NODULES: Primary | ICD-10-CM

## 2025-06-25 PROCEDURE — 1159F MED LIST DOCD IN RCRD: CPT | Performed by: INTERNAL MEDICINE

## 2025-06-25 PROCEDURE — 99213 OFFICE O/P EST LOW 20 MIN: CPT | Performed by: INTERNAL MEDICINE

## 2025-06-25 PROCEDURE — 1125F AMNT PAIN NOTED PAIN PRSNT: CPT | Performed by: INTERNAL MEDICINE

## 2025-06-25 ASSESSMENT — PAIN SCALES - GENERAL: PAINLEVEL_OUTOF10: 6

## 2025-06-25 NOTE — Clinical Note
Hi team, Please schedule Maurice to follow up with me in 6 months. He will do CT and PFTs prior to appt. Orders are in.

## 2025-07-01 ENCOUNTER — PATIENT MESSAGE (OUTPATIENT)
Dept: NEUROLOGY | Facility: CLINIC | Age: 85
End: 2025-07-01
Payer: MEDICARE

## 2025-07-01 DIAGNOSIS — G25.81 RLS (RESTLESS LEGS SYNDROME): ICD-10-CM

## 2025-07-01 RX ORDER — GABAPENTIN 300 MG/1
300 CAPSULE ORAL NIGHTLY
Qty: 90 CAPSULE | Refills: 1 | Status: SHIPPED | OUTPATIENT
Start: 2025-07-01

## 2025-07-07 DIAGNOSIS — M05.79 RHEUMATOID ARTHRITIS INVOLVING MULTIPLE SITES WITH POSITIVE RHEUMATOID FACTOR (MULTI): Primary | ICD-10-CM

## 2025-07-07 RX ORDER — PREDNISONE 10 MG/1
TABLET ORAL
Qty: 15 TABLET | Refills: 1 | Status: SHIPPED | OUTPATIENT
Start: 2025-07-07 | End: 2025-07-22

## 2025-07-09 PROCEDURE — RXMED WILLOW AMBULATORY MEDICATION CHARGE

## 2025-07-14 ENCOUNTER — SPECIALTY PHARMACY (OUTPATIENT)
Dept: PHARMACY | Facility: CLINIC | Age: 85
End: 2025-07-14

## 2025-07-15 ENCOUNTER — PHARMACY VISIT (OUTPATIENT)
Dept: PHARMACY | Facility: CLINIC | Age: 85
End: 2025-07-15
Payer: COMMERCIAL

## 2025-07-21 ENCOUNTER — TELEPHONE (OUTPATIENT)
Facility: CLINIC | Age: 85
End: 2025-07-21
Payer: MEDICARE

## 2025-07-21 RX ORDER — ALBUTEROL SULFATE 90 UG/1
1 INHALANT RESPIRATORY (INHALATION) ONCE
OUTPATIENT
Start: 2025-07-21

## 2025-07-21 RX ORDER — ALBUTEROL SULFATE 0.83 MG/ML
3 SOLUTION RESPIRATORY (INHALATION) ONCE
OUTPATIENT
Start: 2025-07-21 | End: 2025-07-21

## 2025-07-21 NOTE — TELEPHONE ENCOUNTER
----- Message from Obed Camarena sent at 7/21/2025 12:47 AM EDT -----  Hi team, Please schedule Maurice to follow up with me in 6 months. He will do CT and PFTs prior to appt. Orders are in.

## 2025-07-21 NOTE — PROGRESS NOTES
Department of Medicine  Division of Pulmonary, Critical Care, and Sleep Medicine  Follow-Up Visit  [facility] - [address], [address]    Physician HPI:  Mr. Tripp is a pleasant 83-year-old man with past medical history significant for rheumatoid arthritis on leflunomide and hydroxychloroquine who presented to the office today regarding multiple pulmonary nodules.  Maurice has no acute respiratory symptoms today.  I reviewed his most recent CT scan of chest that was done in July 2023, previous CT scan in March and January 2023 and his most recent PET/CT scan. Multiple pulmonary nodules noted the largest is 1.7 cm in size. Nodules are solid and noncalcified. It did show minimal uptake on the PET CT scan. Underwent CT-guided biopsy previously which revealed fibro inflammatory changes with few lymphoplasmacytic inflammation that were negative for IgG stain.         Follow up 1/17/2024:  Respiratory status has been stable since last visit.   Repeated CT scan of chest revealed interval increase in the size of the previously noted pulmonary nodules. In addition, a few of the nodules are now cavitary. The patient denies acute cough, fevers, chills or night sweats since last visit. No acute chest pain or hemoptysis. No interval hx of RA flares up.     Follow up 03/18/2024:  Maurice presented today for follow up.   Reports intermittent productive cough, sputum light yellowish. Feels that cough has overall improved. No acute chest pain, fevers, or hemoptysis. + post nasal drip.     He is s/p percutaneous biopsy of the RUL nodule. Pathology revealed organizing pneumonia with chronic inflammation and focal area of necrosis.  Microbiology has been unremarkable.    Follow up 02/04/2025:  CT scan of chest is scheduled next week.  No acute respiratory symptoms today.  He reports persistent inflammatory arthritis and stiffness in the morning despite leflunomide.  He has an appointment with rheumatology next week.    Follow up (phone)  02/27/2025:  CT scan of chest is reviewed and discussed with Maurice over the phone today. Stable lung nodules when compared with most recent scan. Will repeat CT in 6 months.    Follow up 06/25/2025 (phone):  Respiratory status is stable. Maurice reports intermittent cough. No acute fevers or purulent sputum. CT scan of chest in June revealed stable pulmonary nodules (some of the nodules actually decreased in size). We discussed to obtain a PFTs and discussed to continue observation with repeated CT scan of chest in 6-9 months.      Immunization History   Administered Date(s) Administered    Flu vaccine, quadrivalent, high-dose, preservative free, age 65y+ (FLUZONE) 09/16/2022    Flu vaccine, quadrivalent, no egg protein, age 6 month or greater (FLUCELVAX) 08/13/2019    Flu vaccine, trivalent, preservative free, HIGH-DOSE, age 65y+ (Fluzone) 09/05/2016, 08/16/2017, 08/09/2018, 08/26/2020    Influenza, Unspecified 09/07/2021    Influenza, seasonal, injectable 09/09/2015, 01/01/2016, 09/01/2017, 10/08/2023    Influenza, trivalent, adjuvanted 09/05/2019    Moderna COVID-19 vaccine, 12 years and older (50mcg/0.5mL)(Spikevax) 09/09/2024    Moderna COVID-19 vaccine, bivalent, blue cap/gray label *Check age/dose* 10/02/2022    Moderna SARS-CoV-2 Vaccination 02/16/2021, 03/17/2021, 10/27/2021, 04/12/2022, 10/08/2023    PPD Test 02/07/2020    Pneumococcal conjugate vaccine, 13-valent (PREVNAR 13) 03/25/2015    Pneumococcal polysaccharide vaccine, 23-valent, age 2 years and older (PNEUMOVAX 23) 01/01/2016, 07/17/2018    Td vaccine, age 7 years and older (TENIVAC) 05/14/2008    Tdap vaccine, age 7 year and older (BOOSTRIX, ADACEL) 04/18/2019    Zoster vaccine, recombinant, adult (SHINGRIX) 04/02/2019    Zoster, live 02/27/2008       Current Outpatient Medications   Medication Instructions    acetaminophen (Tylenol) 500 mg tablet 2 tablets, 2 times daily    alendronate (FOSAMAX) 70 mg, oral, Every 7 days, Take on an empty  "stomach. Do not lie down or eat for 1/2 hour after taking.    cetirizine (ZyrTEC) 10 mg tablet 1 tablet, Daily PRN    citalopram (CELEXA) 20 mg, oral, Daily    fluticasone (Flonase) 50 mcg/actuation nasal spray 1 spray, Each Nostril, Daily, Shake gently. Before first use, prime pump. After use, clean tip and replace cap.    gabapentin (NEURONTIN) 300 mg, oral, Nightly    Humira,CF, Pen 40 mg/0.4 mL pen injector kit pen-injector Inject 1 pen (40 mg) under the skin once weekly.    hydroxychloroquine (Plaquenil) 200 mg tablet oral, Daily    losartan (Cozaar) 100 mg tablet oral, Daily    lovastatin (MEVACOR) 20 mg, oral, Daily, as directed    omega-3 acid ethyl esters (LOVAZA) 1 g, Daily    omeprazole (PRILOSEC) 10 mg, oral, Daily    orphenadrine (NORFLEX) 100 mg, oral, 2 times daily PRN, Do not crush, chew, or split.    predniSONE (Deltasone) 10 mg tablet Take 1.5 tablets (15 mg) by mouth once daily for 5 days, THEN 1 tablet (10 mg) once daily for 5 days, THEN 0.5 tablets (5 mg) once daily for 5 days.    predniSONE (Deltasone) 10 mg tablet Take 1.5 tablets (15 mg) by mouth once daily for 5 days, THEN 1 tablet (10 mg) once daily for 5 days, THEN 0.5 tablets (5 mg) once daily for 5 days.    vit A/vit C/vit E/zinc/copper (PRESERVISION AREDS ORAL) 1 capsule, Daily        Allergies:  No Known Allergies       Visit Vitals  Ht 1.854 m (6' 1\")   Wt 107 kg (235 lb)   BMI 31.00 kg/m²   Smoking Status Former   BSA 2.35 m²      Physical Exam     Chest Radiograph     XR chest 1 view 01/21/2024    Narrative  STUDY:  Chest Radiograph;  1/21/2024 at 6:20 PM.  INDICATION:  Chest pain.  COMPARISON:  CT chest 1/10/2024, 7/10/2023, 3/22/2023; XR chest 10/14/2021  ACCESSION NUMBER(S):  [id number]  ORDERING CLINICIAN:  CHINO BRADY  TECHNIQUE:  Frontal chest was obtained at 18:20 hours.  FINDINGS:  CARDIOMEDIASTINAL SILHOUETTE:  Cardiomediastinal silhouette is normal in size and configuration.    LUNGS:  Bilaterally upper lobe " apical nodules again noted, as well as in the  right lower lobe.  This corresponds with recent CT exam.    ABDOMEN:  No remarkable upper abdominal findings.    BONES:  No acute osseous changes.    Impression  Multiple lung nodules, no focal infiltrates seen.  Signed by Britton Kumar MD      XR CHEST 1 VIEW 02/10/2023    Narrative  MRN: [id number]  Patient Name: YI DAMON    STUDY:  CHEST 1 VIEW;  2/10/2023 1:49 pm    INDICATION:  bilateral lung nodules. now s/p YOSI nodule bx. eval for PTX .    COMPARISON:  Chest radiograph 2/10/2023 10:51 a.m.    ACCESSION NUMBER(S):  [id number]    ORDERING CLINICIAN:  ISHAN CEJA    FINDINGS:  PA and lateral radiographs of the chest were provided.  Additional PA  dual energy images were also provided.        CARDIOMEDIASTINAL SILHOUETTE:  Cardiomediastinal silhouette is normal in size and configuration.    LUNGS:  No pneumothorax. No consolidation, pulmonary edema, or pleural  effusion.    ABDOMEN:  No remarkable upper abdominal findings.    BONES:  No acute osseous changes.    Impression  No sizable pneumothorax status post recent biopsy.    I personally reviewed the images/study and I agree with the findings  as stated by radiology resident Kristal Strauss MD. This study was  interpreted at [facility],  [place], [place].      Echocardiogram     Echocardiogram     Narrative  [facility] [place]  [address] [address]  Tel [phone] Fax [phone]    TRANSTHORACIC ECHOCARDIOGRAM REPORT      Patient Name:     YI RILEY ROSES Reading Physician:   [id number] Jaky Hoyos MD  Study Date:       7/13/2023      Referring Physician: JAKY HOYOS  MRN/PID:          [id number]       PCP:  Accession/Order#: [id number]   Department Location: [facility] Echo Lab  YOB: 1940       Fellow:  Gender:           M              Nurse:  Admit Date:                      Sonographer:         Mikki Luque RD  Admission Status:  Outpatient     Additional Staff:  Height:           185.42 cm      CC Report to:  Weight:           108.41 kg      Study Type:          Echocardiogram  BSA:              2.32 m2  Blood Pressure: 120 /68 mmHg    Diagnosis/ICD: R06.09-Other forms of dyspnea  Indication:    Dyspnea  Procedure/CPT: Echo Complete w Full Doppler-[id number]  Study Detail: The following Echo studies were performed: 2D, M-Mode, color flow  and Doppler. Definity used as a contrast agent for endocardial  border definition. Total contrast used for this procedure was 3 mL  via IV push.      PHYSICIAN INTERPRETATION:  Left Ventricle: Left ventricular systolic function is normal, with an estimated ejection fraction of 55-60%. There are no regional wall motion abnormalities. The left ventricular cavity size is normal. Spectral Doppler shows an impaired relaxation pattern of left ventricular diastolic filling.  Left Atrium: The left atrium is normal in size.  Right Ventricle: The right ventricle is normal in size. There is normal right ventricular global systolic function.  Right Atrium: The right atrium is normal in size.  Aortic Valve: The aortic valve appears structurally normal. There is mild aortic valve regurgitation. The peak instantaneous gradient of the aortic valve is 7.4 mmHg. The mean gradient of the aortic valve is 4.0 mmHg.  Mitral Valve: The mitral valve is normal in structure. There is no evidence of mitral valve regurgitation.  Tricuspid Valve: The tricuspid valve is structurally normal. There is trace tricuspid regurgitation. The estimated RVSP is 29 mm.  Pulmonic Valve: The pulmonic valve is structurally normal. There is trace pulmonic valve regurgitation.  Pericardium: There is no pericardial effusion noted.  Aorta: The aortic root is normal.      CONCLUSIONS:  1. Left ventricular systolic function is normal with a 55-60% estimated ejection fraction.  2. Spectral Doppler shows an impaired relaxation pattern of left ventricular  diastolic filling.  3. The estimated RVSP is 29 mm.  4. Mild aortic valve regurgitation.    QUANTITATIVE DATA SUMMARY:  2D MEASUREMENTS:  Normal Ranges:  LAs:           4.30 cm   (2.7-4.0cm)  IVSd:          0.98 cm   (0.6-1.1cm)  LVPWd:         0.80 cm   (0.6-1.1cm)  LVIDd:         5.40 cm   (3.9-5.9cm)  LVIDs:         4.22 cm  LV Mass Index: 76.5 g/m2  LV % FS        21.9 %    LA VOLUME:  Normal Ranges:  LA Vol A4C:        41.2 ml    (22+/-6mL/m2)  LA Vol A2C:        43.6 ml  LA Vol BP:         42.5 ml  LA Vol Index A4C:  17.8ml/m2  LA Vol Index A2C:  18.8 ml/m2  LA Vol Index BP:   18.3 ml/m2  LA Area A4C:       16.2 cm2  LA Area A2C:       16.7 cm2  LA Major Axis A4C: 5.4 cm  LA Major Axis A2C: 5.4 cm  LA Volume Index:   16.8 ml/m2  LA Vol A4C:        37.0 ml  LA Vol A2C:        42.0 ml    AORTA MEASUREMENTS:  Normal Ranges:  Asc Ao, d: 3.75 cm (2.1-3.4cm)    LV SYSTOLIC FUNCTION BY 2D PLANIMETRY (MOD):  Normal Ranges:  EF-A4C View: 55.3 % (>=55%)  EF-A2C View: 55.6 %  EF-Biplane:  54.9 %    LV DIASTOLIC FUNCTION:  Normal Ranges:  MV Peak E:    0.49 m/s (0.7-1.2 m/s)  MV Peak A:    0.69 m/s (0.42-0.7 m/s)  E/A Ratio:    0.71     (1.0-2.2)  MV e'         0.07 m/s (>8.0)  MV lateral e' 0.09 m/s  MV medial e'  0.05 m/s  E/e' Ratio:   7.02     (<8.0)    MITRAL VALVE:  Normal Ranges:  MV DT: 289 msec (150-240msec)    AORTIC VALVE:  Normal Ranges:  AoV Vmax:                1.36 m/s (<=1.7m/s)  AoV Peak P.4 mmHg (<20mmHg)  AoV Mean P.0 mmHg (1.7-11.5mmHg)  LVOT Max Juaquin:            0.89 m/s (<=1.1m/s)  AoV VTI:                 25.90 cm (18-25cm)  LVOT VTI:                18.20 cm  LVOT Diameter:           2.00 cm  (1.8-2.4cm)  AoV Area, VTI:           2.21 cm2 (2.5-5.5cm2)  AoV Area,Vmax:           2.05 cm2 (2.5-4.5cm2)  AoV Dimensionless Index: 0.70    AORTIC INSUFFICIENCY:  AI Vmax:       4.15 m/s  AI Half-time:  652 msec  AI Decel Rate: 197.50 cm/s2      RIGHT VENTRICLE:  RV Basal 3.12  cm  RV Mid   2.13 cm  RV Major 7.3 cm  TAPSE:   23.3 mm  RV s'    0.11 m/s    TRICUSPID VALVE/RVSP:  Normal Ranges:  Peak TR Velocity: 1.98 m/s  RV Syst Pressure: 18.7 mmHg (< 30mmHg)      [id number] Bull Balderas MD  Electronically signed on 7/14/2023 at 6:55:35 PM       Chest CT Scan     CT chest wo IV contrast 01/10/2024    Narrative  Interpreted By:  Sandro Mayer,  STUDY:  CT CHEST WO IV CONTRAST;  1/10/2024 9:38 am    INDICATION:  Signs/Symptoms: multiple lung nodules - 6 months follow up  R91.8:  Lung nodules.    COMPARISON:  07/10/2023    ACCESSION NUMBER(S):  [id number]    ORDERING CLINICIAN:  TERESSA RODGERS    TECHNIQUE:  Helical data acquisition of the chest was obtained without the use of  IV contrast. Images were reformatted in axial, coronal, and sagittal  planes.    FINDINGS:  POTENTIAL LIMITATIONS OF THE STUDY:   Lack of IV contrast    HEART AND VESSELS:    There are atherosclerotic calcifications of the aorta and its  branches. The aorta is unchanged in course and caliber.    The heart is not significantly enlarged.    No pericardial effusion is seen.    MEDIASTINUM AND JOE, LOWER NECK AND AXILLA:  The visualized thyroid gland is within normal limits.    No evidence of thoracic lymphadenopathy by CT criteria.    Esophagus appears within normal limits as seen.    LUNGS AND AIRWAYS:  The trachea and central airways are patent. No endobronchial lesion.    There are numerous nodules scattered throughout the lungs which have  increased in size when compared to the previous examination. In  addition, a few of the nodules are now cavitary. A right upper lobe  nodule which measures up to 1.5 cm on today's study measured  approximately 1.3 cm on the previous study, image 72. Cavitary nodule  in the left upper lobe which measures 1.8 cm on today's exam measured  approximately 1.6 cm on the previous study, image 68. 9 mm nodule in  the right lower lobe measured 7 mm on the previous study, image 154.  The  remaining nodules are either increased in size or are stable in  size. No definite new nodules are identified. No effusion. No  pneumothorax. Mild scattered areas of atelectasis/scarring.    UPPER ABDOMEN:  The visualized subdiaphragmatic structures demonstrate no acute  abnormality.    CHEST WALL AND OSSEOUS STRUCTURES:  Degenerative changes. No acute process.    Impression  Interval increase in the size of the previously noted pulmonary  nodules. In addition, a few of the nodules are now cavitary. Findings  may relate to progression of metastatic disease or possibly  infectious/inflammatory process. Correlate with biopsy results.    MACRO:  None.    Signed by: Charito Mayer 1/11/2024 4:25 PM  Dictation workstation:   [id number]      CT chest wo IV contrast 07/10/2023    Narrative  Interpreted By:  CHARITO MAYER MD  MRN: [id number]  Patient Name: YI DAMON    STUDY:  CT CHEST WO CONTRAST;  7/10/2023 4:57 pm    INDICATION:  lung nodules  R91.8: Multiple pulmonary nodules determined by  computed tomography of lung.    COMPARISON:  03/22/2023    ACCESSION NUMBER(S):  [id number]    ORDERING CLINICIAN:  ANNIE ROSADO    TECHNIQUE:  Helical data acquisition of the chest was obtained without the use of  IV contrast. Images were reformatted in axial, coronal, and sagittal  planes.    FINDINGS:  POTENTIAL LIMITATIONS OF THE STUDY:   Lack of IV contrast    HEART AND VESSELS:    There are atherosclerotic calcifications of the aorta and its  branches. Aorta is unchanged in course and caliber.    The heart is unchanged in size.    No pericardial effusion is seen.    MEDIASTINUM AND JOE, LOWER NECK AND AXILLA:  The visualized thyroid gland is within normal limits.    No evidence of thoracic lymphadenopathy by CT criteria.    Esophagus is stable in course and caliber. Small hiatal hernia.    LUNGS AND AIRWAYS:  The trachea and central airways are patent. No endobronchial lesion.    The patient's known pulmonary nodules are again  identified and are  not significantly changed in size, measuring up to 1.7 Cm. For  example, images 62, 58, 168, 191, 215 and 201 of 293. No new  suspicious pulmonary nodule or mass. No consolidation. No evidence of  an effusion or pneumothorax. There are mild scattered areas of  atelectasis/scarring.    UPPER ABDOMEN:  The visualized subdiaphragmatic structures demonstrate no acute  abnormality. Stable subcentimeter hypoattenuating lesion in the right  hepatic lobe which is statistically most likely a cyst but is too  small to characterize. Left renal cyst is again partially  included/imaged.    CHEST WALL AND OSSEOUS STRUCTURES:  Degenerative changes. No acute process.    Impression  Stable scattered pulmonary nodules measuring 1.7 cm or less in size.  No new suspicious pulmonary nodule or mass. Continued surveillance  recommended as per Fleischner society guidelines.       Laboratory Studies     Lab Results   Component Value Date    WBC 8.5 05/07/2025    HGB 11.6 (L) 05/07/2025    HCT 35.8 (L) 05/07/2025    MCV 89.7 05/07/2025     05/07/2025      Lab Results   Component Value Date    GLUCOSE 82 05/07/2025    CALCIUM 9.1 05/07/2025     05/07/2025    K 4.7 05/07/2025    CO2 22 05/07/2025     05/07/2025    BUN 20 05/07/2025    CREATININE 1.24 (H) 05/07/2025      Lab Results   Component Value Date    ALT 12 05/07/2025    AST 17 05/07/2025    ALKPHOS 54 05/07/2025    BILITOT 0.4 05/07/2025        Protime   Date/Time Value Ref Range Status   02/06/2024 08:09 AM 11.9 9.8 - 12.8 seconds Final     INR   Date/Time Value Ref Range Status   02/06/2024 08:09 AM 1.1 0.9 - 1.1 Final           Assessment and Plan / Recommendations     See HPI    Obed Camarena MD   06/25/2025

## 2025-07-28 ENCOUNTER — APPOINTMENT (OUTPATIENT)
Dept: NEUROLOGY | Facility: CLINIC | Age: 85
End: 2025-07-28
Payer: MEDICARE

## 2025-07-28 VITALS
HEART RATE: 81 BPM | TEMPERATURE: 96.7 F | WEIGHT: 247.8 LBS | SYSTOLIC BLOOD PRESSURE: 110 MMHG | DIASTOLIC BLOOD PRESSURE: 60 MMHG | HEIGHT: 73 IN | BODY MASS INDEX: 32.84 KG/M2

## 2025-07-28 DIAGNOSIS — G60.8 POLYNEUROPATHY, PERIPHERAL SENSORIMOTOR AXONAL: Primary | ICD-10-CM

## 2025-07-28 DIAGNOSIS — G25.81 RLS (RESTLESS LEGS SYNDROME): ICD-10-CM

## 2025-07-28 PROCEDURE — 1159F MED LIST DOCD IN RCRD: CPT | Performed by: PSYCHIATRY & NEUROLOGY

## 2025-07-28 PROCEDURE — G2211 COMPLEX E/M VISIT ADD ON: HCPCS | Performed by: PSYCHIATRY & NEUROLOGY

## 2025-07-28 PROCEDURE — 3074F SYST BP LT 130 MM HG: CPT | Performed by: PSYCHIATRY & NEUROLOGY

## 2025-07-28 PROCEDURE — 99213 OFFICE O/P EST LOW 20 MIN: CPT | Performed by: PSYCHIATRY & NEUROLOGY

## 2025-07-28 PROCEDURE — 3078F DIAST BP <80 MM HG: CPT | Performed by: PSYCHIATRY & NEUROLOGY

## 2025-07-28 RX ORDER — GABAPENTIN 300 MG/1
300 CAPSULE ORAL NIGHTLY
Qty: 90 CAPSULE | Refills: 3 | Status: SHIPPED | OUTPATIENT
Start: 2025-07-28

## 2025-07-28 ASSESSMENT — PATIENT HEALTH QUESTIONNAIRE - PHQ9
2. FEELING DOWN, DEPRESSED OR HOPELESS: NOT AT ALL
SUM OF ALL RESPONSES TO PHQ9 QUESTIONS 1 & 2: 0
1. LITTLE INTEREST OR PLEASURE IN DOING THINGS: NOT AT ALL

## 2025-07-28 ASSESSMENT — LIFESTYLE VARIABLES
AUDIT-C TOTAL SCORE: 0
HOW OFTEN DO YOU HAVE SIX OR MORE DRINKS ON ONE OCCASION: NEVER
HOW MANY STANDARD DRINKS CONTAINING ALCOHOL DO YOU HAVE ON A TYPICAL DAY: PATIENT DOES NOT DRINK
SKIP TO QUESTIONS 9-10: 1
HOW OFTEN DO YOU HAVE A DRINK CONTAINING ALCOHOL: NEVER

## 2025-07-28 NOTE — PROGRESS NOTES
Chief Complaint: Neuropathy, RLS    HPI  84 y.o. male presenting for follow up regarding above.    Since the last visit, he continues to note abnormal sensations in his feet.  He notes an improvement in the numbness.  However, he notes a feeling like he is always wearing a boot.  He denies any painful paresthesias.  He denies any low back pain.  He does have flare ups with RA - he attributes this as a cause for the weakness.  Of note, he is no longer on Leflunomide.      He is on Gabapentin for RLS.  Him and his wife note a modest improvement.  He denies any side effects.        Current Medications[1]      Objective:  Gen: NAD  Neuro:  --HIF: A&O X 3, repetition and naming intact  --CN:  PERRLA, EOMI, VFF, no visible facial asymmetry, facial sensation intact, no tongue or palatal deviation, SCM intact  --Motor: Moves all 4 extremities equally; no focal deficits  --Sensory: Pin is reduced in the toes, vibration is absent in the toes  --Reflex: 3+ in UE, 3+ patella, absent achilles, toes down, ordonez's negative  --Cerebellum: FTN and HTS intact  --Gait: Slight Steppage Gait    Relevant Results  SPEP with LEONARD in 2022: normal    Labs from 1/27/2025  Hemoglobin A1C: 5.9  Vitamin B6 - 4.0  Ferritin 477        Assessment:    Sensorimotor axonal polyneuropathy  - ddx includes adverse effects from Leflunomide; effects of RA  - overall stable  - no significant painful paresthesias  - recommend PT for balance training    2.  RLS - notes a modest improvement  - continue Gabapentin 300 mg at bedtime    Follow up in 1 year      Nicholas Houser MD  [facility]  Department of Neurology         [1]   Current Outpatient Medications:     acetaminophen (Tylenol) 500 mg tablet, Take 2 tablets (1,000 mg) by mouth 2 times a day., Disp: , Rfl:     alendronate (Fosamax) 70 mg tablet, Take 1 tablet (70 mg) by mouth every 7 days. Take on an empty stomach. Do not lie down or eat for 1/2 hour after taking., Disp: 51 tablet, Rfl:  0    cetirizine (ZyrTEC) 10 mg tablet, Take 1 tablet (10 mg) by mouth once daily as needed for allergies., Disp: , Rfl:     citalopram (CeleXA) 20 mg tablet, TAKE 1 TABLET DAILY, Disp: 90 tablet, Rfl: 3    gabapentin (Neurontin) 300 mg capsule, Take 1 capsule (300 mg) by mouth once daily at bedtime., Disp: 90 capsule, Rfl: 1    Humira,CF, Pen 40 mg/0.4 mL pen injector kit pen-injector, Inject 1 pen (40 mg) under the skin once weekly., Disp: 4 each, Rfl: 3    hydroxychloroquine (Plaquenil) 200 mg tablet, TAKE 2 TABLETS ONCE DAILY, Disp: 180 tablet, Rfl: 3    losartan (Cozaar) 100 mg tablet, TAKE ONE-HALF (1/2) TABLET DAILY, Disp: 45 tablet, Rfl: 3    lovastatin (Mevacor) 20 mg tablet, TAKE 1 TABLET DAILY AS DIRECTED, Disp: 90 tablet, Rfl: 3    omega-3 acid ethyl esters (Lovaza) 1 gram capsule, Take 1 capsule (1 g) by mouth once daily., Disp: , Rfl:     omeprazole (PriLOSEC) 10 mg DR capsule, TAKE 1 CAPSULE DAILY, Disp: 90 capsule, Rfl: 3    predniSONE (Deltasone) 10 mg tablet, Take 1.5 tablets (15 mg) by mouth once daily for 5 days, THEN 1 tablet (10 mg) once daily for 5 days, THEN 0.5 tablets (5 mg) once daily for 5 days., Disp: , Rfl:     vit A/vit C/vit E/zinc/copper (PRESERVISION AREDS ORAL), Take 1 capsule by mouth once daily., Disp: , Rfl:     fluticasone (Flonase) 50 mcg/actuation nasal spray, Administer 1 spray into each nostril once daily. Shake gently. Before first use, prime pump. After use, clean tip and replace cap., Disp: 16 g, Rfl: 2    orphenadrine (Norflex) 100 mg 12 hr tablet, Take 1 tablet (100 mg) by mouth 2 times a day as needed for muscle spasms for up to 10 days. Do not crush, chew, or split., Disp: 20 tablet, Rfl: 0

## 2025-07-29 DIAGNOSIS — M05.79 RHEUMATOID ARTHRITIS INVOLVING MULTIPLE SITES WITH POSITIVE RHEUMATOID FACTOR (MULTI): ICD-10-CM

## 2025-07-29 RX ORDER — PREDNISONE 10 MG/1
TABLET ORAL
Qty: 25 TABLET | Refills: 1 | Status: SHIPPED | OUTPATIENT
Start: 2025-07-29 | End: 2025-08-18

## 2025-08-06 ENCOUNTER — SPECIALTY PHARMACY (OUTPATIENT)
Dept: PHARMACY | Facility: CLINIC | Age: 85
End: 2025-08-06

## 2025-08-06 PROCEDURE — RXMED WILLOW AMBULATORY MEDICATION CHARGE

## 2025-08-07 ENCOUNTER — PHARMACY VISIT (OUTPATIENT)
Dept: PHARMACY | Facility: CLINIC | Age: 85
End: 2025-08-07
Payer: COMMERCIAL

## 2025-08-12 DIAGNOSIS — Z79.69 ON LONG TERM LEFLUNOMIDE THERAPY: ICD-10-CM

## 2025-08-12 DIAGNOSIS — M05.79 RHEUMATOID ARTHRITIS INVOLVING MULTIPLE SITES WITH POSITIVE RHEUMATOID FACTOR (MULTI): ICD-10-CM

## 2025-08-12 DIAGNOSIS — R91.8 MULTIPLE PULMONARY NODULES DETERMINED BY COMPUTED TOMOGRAPHY OF LUNG: ICD-10-CM

## 2025-08-12 DIAGNOSIS — Z79.620 ENCOUNTER FOR MONITORING OF ADALIMUMAB THERAPY: ICD-10-CM

## 2025-08-12 DIAGNOSIS — Z79.899 LONG-TERM USE OF PLAQUENIL: ICD-10-CM

## 2025-08-12 DIAGNOSIS — E55.9 VITAMIN D DEFICIENCY: ICD-10-CM

## 2025-08-12 DIAGNOSIS — Z51.81 ENCOUNTER FOR MONITORING OF ADALIMUMAB THERAPY: ICD-10-CM

## 2025-08-19 ENCOUNTER — APPOINTMENT (OUTPATIENT)
Dept: RHEUMATOLOGY | Facility: CLINIC | Age: 85
End: 2025-08-19
Payer: MEDICARE

## 2025-08-27 ENCOUNTER — APPOINTMENT (OUTPATIENT)
Facility: CLINIC | Age: 85
End: 2025-08-27
Payer: MEDICARE

## 2025-08-27 DIAGNOSIS — M05.79 RHEUMATOID ARTHRITIS INVOLVING MULTIPLE SITES WITH POSITIVE RHEUMATOID FACTOR (MULTI): ICD-10-CM

## 2025-08-27 RX ORDER — ADALIMUMAB 40MG/0.4ML
KIT SUBCUTANEOUS
Qty: 4 EACH | Refills: 3 | Status: CANCELLED | OUTPATIENT
Start: 2025-08-27

## 2025-08-29 ENCOUNTER — SPECIALTY PHARMACY (OUTPATIENT)
Dept: PHARMACY | Facility: CLINIC | Age: 85
End: 2025-08-29

## 2025-09-01 ASSESSMENT — DERMATOLOGY QUALITY OF LIFE (QOL) ASSESSMENT
WHAT SINGLE SKIN CONDITION LISTED BELOW IS THE PATIENT ANSWERING THE QUALITY-OF-LIFE ASSESSMENT QUESTIONS ABOUT: NONE OF THE ABOVE
WHAT SINGLE SKIN CONDITION LISTED BELOW IS THE PATIENT ANSWERING THE QUALITY-OF-LIFE ASSESSMENT QUESTIONS ABOUT: NONE OF THE ABOVE
RATE HOW BOTHERED YOU ARE BY EFFECTS OF YOUR SKIN PROBLEMS ON YOUR ACTIVITIES (EG, GOING OUT, ACCOMPLISHING WHAT YOU WANT, WORK ACTIVITIES OR YOUR RELATIONSHIPS WITH OTHERS): 5
RATE HOW BOTHERED YOU ARE BY SYMPTOMS OF YOUR SKIN PROBLEM (EG, ITCHING, STINGING BURNING, HURTING OR SKIN IRRITATION): 1
RATE HOW EMOTIONALLY BOTHERED YOU ARE BY YOUR SKIN PROBLEM (FOR EXAMPLE, WORRY, EMBARRASSMENT, FRUSTRATION): 6 - ALWAYS BOTHERED
RATE HOW BOTHERED YOU ARE BY EFFECTS OF YOUR SKIN PROBLEMS ON YOUR ACTIVITIES (EG, GOING OUT, ACCOMPLISHING WHAT YOU WANT, WORK ACTIVITIES OR YOUR RELATIONSHIPS WITH OTHERS): 5
RATE HOW EMOTIONALLY BOTHERED YOU ARE BY YOUR SKIN PROBLEM (FOR EXAMPLE, WORRY, EMBARRASSMENT, FRUSTRATION): 6 - ALWAYS BOTHERED
RATE HOW BOTHERED YOU ARE BY SYMPTOMS OF YOUR SKIN PROBLEM (EG, ITCHING, STINGING BURNING, HURTING OR SKIN IRRITATION): 1

## 2025-09-03 ENCOUNTER — OFFICE VISIT (OUTPATIENT)
Dept: CARDIOLOGY | Facility: CLINIC | Age: 85
End: 2025-09-03
Payer: MEDICARE

## 2025-09-03 ENCOUNTER — APPOINTMENT (OUTPATIENT)
Dept: DERMATOLOGY | Facility: CLINIC | Age: 85
End: 2025-09-03
Payer: MEDICARE

## 2025-09-03 VITALS
HEART RATE: 76 BPM | SYSTOLIC BLOOD PRESSURE: 118 MMHG | DIASTOLIC BLOOD PRESSURE: 60 MMHG | WEIGHT: 250 LBS | BODY MASS INDEX: 33.13 KG/M2 | HEIGHT: 73 IN

## 2025-09-03 DIAGNOSIS — I10 PRIMARY HYPERTENSION: ICD-10-CM

## 2025-09-03 DIAGNOSIS — I25.10 CORONARY ARTERY CALCIFICATION: ICD-10-CM

## 2025-09-03 DIAGNOSIS — E78.49 OTHER HYPERLIPIDEMIA: ICD-10-CM

## 2025-09-03 PROCEDURE — 99214 OFFICE O/P EST MOD 30 MIN: CPT | Performed by: INTERNAL MEDICINE

## 2025-09-03 PROCEDURE — 1160F RVW MEDS BY RX/DR IN RCRD: CPT | Performed by: INTERNAL MEDICINE

## 2025-09-03 PROCEDURE — 1159F MED LIST DOCD IN RCRD: CPT | Performed by: INTERNAL MEDICINE

## 2025-09-03 PROCEDURE — 3078F DIAST BP <80 MM HG: CPT | Performed by: INTERNAL MEDICINE

## 2025-09-03 PROCEDURE — 3074F SYST BP LT 130 MM HG: CPT | Performed by: INTERNAL MEDICINE

## 2025-09-03 PROCEDURE — 1036F TOBACCO NON-USER: CPT | Performed by: INTERNAL MEDICINE

## 2025-09-03 PROCEDURE — G2211 COMPLEX E/M VISIT ADD ON: HCPCS | Performed by: INTERNAL MEDICINE

## 2025-09-05 DIAGNOSIS — M05.79 RHEUMATOID ARTHRITIS INVOLVING MULTIPLE SITES WITH POSITIVE RHEUMATOID FACTOR (MULTI): Primary | ICD-10-CM

## 2025-09-06 RX ORDER — ADALIMUMAB 40MG/0.4ML
KIT SUBCUTANEOUS
Qty: 4 EACH | Refills: 3 | Status: SHIPPED | OUTPATIENT
Start: 2025-09-06

## 2025-10-03 ENCOUNTER — APPOINTMENT (OUTPATIENT)
Dept: CARDIOLOGY | Facility: CLINIC | Age: 85
End: 2025-10-03
Payer: MEDICARE

## 2025-10-30 ENCOUNTER — APPOINTMENT (OUTPATIENT)
Dept: PRIMARY CARE | Facility: CLINIC | Age: 85
End: 2025-10-30
Payer: MEDICARE

## 2025-11-11 ENCOUNTER — APPOINTMENT (OUTPATIENT)
Dept: RHEUMATOLOGY | Facility: CLINIC | Age: 85
End: 2025-11-11
Payer: MEDICARE

## 2026-01-22 ENCOUNTER — APPOINTMENT (OUTPATIENT)
Facility: CLINIC | Age: 86
End: 2026-01-22
Payer: MEDICARE

## 2026-07-28 ENCOUNTER — APPOINTMENT (OUTPATIENT)
Dept: NEUROLOGY | Facility: CLINIC | Age: 86
End: 2026-07-28
Payer: MEDICARE

## 2026-09-08 ENCOUNTER — APPOINTMENT (OUTPATIENT)
Dept: CARDIOLOGY | Facility: CLINIC | Age: 86
End: 2026-09-08
Payer: MEDICARE

## 2026-09-09 ENCOUNTER — APPOINTMENT (OUTPATIENT)
Dept: DERMATOLOGY | Facility: CLINIC | Age: 86
End: 2026-09-09
Payer: MEDICARE